# Patient Record
Sex: MALE | Race: BLACK OR AFRICAN AMERICAN | Employment: OTHER | URBAN - METROPOLITAN AREA
[De-identification: names, ages, dates, MRNs, and addresses within clinical notes are randomized per-mention and may not be internally consistent; named-entity substitution may affect disease eponyms.]

---

## 2020-10-02 ENCOUNTER — VIRTUAL VISIT (OUTPATIENT)
Dept: INTERNAL MEDICINE CLINIC | Age: 65
End: 2020-10-02
Payer: MEDICARE

## 2020-10-02 DIAGNOSIS — Z20.822 ENCOUNTER FOR SCREENING LABORATORY TESTING FOR COVID-19 VIRUS: ICD-10-CM

## 2020-10-02 DIAGNOSIS — M54.50 CHRONIC BILATERAL LOW BACK PAIN, UNSPECIFIED WHETHER SCIATICA PRESENT: ICD-10-CM

## 2020-10-02 DIAGNOSIS — G89.29 CHRONIC KNEE PAIN, UNSPECIFIED LATERALITY: ICD-10-CM

## 2020-10-02 DIAGNOSIS — N40.1 BENIGN PROSTATIC HYPERPLASIA WITH LOWER URINARY TRACT SYMPTOMS, SYMPTOM DETAILS UNSPECIFIED: ICD-10-CM

## 2020-10-02 DIAGNOSIS — I48.0 PAROXYSMAL ATRIAL FIBRILLATION (HCC): ICD-10-CM

## 2020-10-02 DIAGNOSIS — I10 ESSENTIAL HYPERTENSION: Primary | ICD-10-CM

## 2020-10-02 DIAGNOSIS — G89.29 CHRONIC BILATERAL LOW BACK PAIN, UNSPECIFIED WHETHER SCIATICA PRESENT: ICD-10-CM

## 2020-10-02 DIAGNOSIS — M25.569 CHRONIC KNEE PAIN, UNSPECIFIED LATERALITY: ICD-10-CM

## 2020-10-02 DIAGNOSIS — Z79.891 CHRONIC PRESCRIPTION OPIATE USE: ICD-10-CM

## 2020-10-02 PROCEDURE — G8432 DEP SCR NOT DOC, RNG: HCPCS | Performed by: PHYSICIAN ASSISTANT

## 2020-10-02 PROCEDURE — G8536 NO DOC ELDER MAL SCRN: HCPCS | Performed by: PHYSICIAN ASSISTANT

## 2020-10-02 PROCEDURE — 99213 OFFICE O/P EST LOW 20 MIN: CPT | Performed by: PHYSICIAN ASSISTANT

## 2020-10-02 PROCEDURE — G8421 BMI NOT CALCULATED: HCPCS | Performed by: PHYSICIAN ASSISTANT

## 2020-10-02 PROCEDURE — 1101F PT FALLS ASSESS-DOCD LE1/YR: CPT | Performed by: PHYSICIAN ASSISTANT

## 2020-10-02 PROCEDURE — 3017F COLORECTAL CA SCREEN DOC REV: CPT | Performed by: PHYSICIAN ASSISTANT

## 2020-10-02 PROCEDURE — G8427 DOCREV CUR MEDS BY ELIG CLIN: HCPCS | Performed by: PHYSICIAN ASSISTANT

## 2020-10-02 RX ORDER — HYDROCHLOROTHIAZIDE 25 MG/1
TABLET ORAL
COMMUNITY
Start: 2020-08-21 | End: 2020-10-21 | Stop reason: SDUPTHER

## 2020-10-02 RX ORDER — CARVEDILOL 25 MG/1
TABLET ORAL
COMMUNITY
Start: 2020-08-24 | End: 2020-10-21 | Stop reason: SDUPTHER

## 2020-10-02 RX ORDER — TAMSULOSIN HYDROCHLORIDE 0.4 MG/1
CAPSULE ORAL
COMMUNITY
Start: 2020-09-08 | End: 2020-10-21 | Stop reason: SDUPTHER

## 2020-10-02 RX ORDER — APIXABAN 5 MG/1
TABLET, FILM COATED ORAL
COMMUNITY
Start: 2020-09-09 | End: 2020-10-21 | Stop reason: SDUPTHER

## 2020-10-02 RX ORDER — TRAMADOL HYDROCHLORIDE 50 MG/1
TABLET ORAL
COMMUNITY
Start: 2020-08-25 | End: 2020-10-21 | Stop reason: SDUPTHER

## 2020-10-02 RX ORDER — FINASTERIDE 5 MG/1
TABLET, FILM COATED ORAL
COMMUNITY
Start: 2020-09-11 | End: 2020-10-21 | Stop reason: SDUPTHER

## 2020-10-02 RX ORDER — AMLODIPINE BESYLATE 10 MG/1
TABLET ORAL
COMMUNITY
Start: 2020-09-11 | End: 2020-10-21 | Stop reason: SDUPTHER

## 2020-10-02 NOTE — PROGRESS NOTES
1. Have you been to the ER, urgent care clinic since your last visit? Hospitalized since your last visit?no    2. Have you seen or consulted any other health care providers outside of the [address] [place] [address] since your last visit? Include any pap smears or colon screening. No   please link to [email or web address]

## 2020-10-02 NOTE — PROGRESS NOTES
Archie Singh is a 77 y.o. male who was seen by synchronous (real-time) audio-video technology on 10/2/2020    Consent: Archie Singh, who was seen by synchronous (real-time) audio-video technology, and/or his healthcare decision maker, is aware that this patient-initiated, Telehealth encounter on 10/2/2020 is a billable service, with coverage as determined by his insurance carrier. He is aware that he may receive a bill and has provided verbal consent to proceed: YES    Assessment & Plan:   Diagnoses and all orders for this visit:    1. Essential hypertension  Controlled previously per pt. 2. Paroxysmal atrial fibrillation (HCC)  -     REFERRAL TO CARDIOLOGY    3. Benign prostatic hyperplasia with lower urinary tract symptoms, symptom details unspecified  -     REFERRAL TO UROLOGY    4. Encounter for screening laboratory testing for COVID-19 virus  -     SARS-COV-2 AB, IGG; Future    5. Chronic bilateral low back pain, unspecified whether sciatica present  -     REFERRAL TO ORTHOPEDIC SURGERY    6. Chronic knee pain, unspecified laterality  -     REFERRAL TO ORTHOPEDIC SURGERY    7. Chronic prescription opiate use  -     TRAMADOL; Future  -     DRUG SCREEN, URINE; Future        Subjective:   Archie Singh is a 77 y.o. male who was seen for [facility]    New patient. New to [facility]. Moved to [place] from [place] last month. Daughter lives in [place]. Last saw PCP in August. UTD with labs. Requests COVID antibody test. Asymptomatic. No concerning contacts.      HTN Follow up - BP controlled previously  BP Readings from Last 3 Encounters:   No data found for BP     Currently taking:   Key CAD CHF Meds             amLODIPine (NORVASC) 10 mg tablet (Taking) TAKE 1 TABLET BY MOUTH EVERY DAY    Eliquis 5 mg tablet (Taking) TAKE 1 TABLET BY MOUTH TWICE A DAY    carvediloL (COREG) 25 mg tablet (Taking) TAKE 1 TABLET BY MOUTH TWICE A DAY    hydroCHLOROthiazide (HYDRODIURIL) 25 mg tablet (Taking) TAKE 1 TABLET BY MOUTH EVERY DAY        Hx Afib found on preop for a knee surg. Has had 3 known episodes of a fib. Now on Eliquis for life. Flecainide Rx but not taking it daily - PRN. Has never. Last saw Cardiology recently. Wants to establish in [facility]. Chronic knee and back pain Hx ablasion of nerves in lower back and chronic knee pain - taking Tramadol 2-3x/day x 2 years. Would like ortho referral. [facility] to [facility] [place] [facility] [place]. One prescriber. Last fill of Tramadol 50 mg #180 was 8/26/20. Pt notes that #180 was more than usual due to move. Usually getting #90 q30 days. Pt believes he will next need Tramadol refill in November. Hx BPH - seeing urology regularly. Needs referral.     There are no preventive care reminders to display for this patient. Review of Systems   Constitutional: Negative for fever. Respiratory: Negative for cough and shortness of breath. Cardiovascular: Negative for chest pain and palpitations. Musculoskeletal: Positive for back pain and joint pain. Negative for falls. Neurological: Negative for dizziness, loss of consciousness and weakness. Psychiatric/Behavioral: Negative for depression and substance abuse. Objective:     General: alert, cooperative, no distress   Mental  status: normal mood, behavior, speech, dress, motor activity, and thought processes, able to follow commands   HENT: NCAT   Neck: no visualized mass   Resp: no respiratory distress   Neuro: no gross deficits   Skin: no discoloration or lesions of concern on visible areas   Psychiatric: normal affect, consistent with stated mood, no evidence of hallucinations     We discussed the expected course, resolution and complications of the diagnosis(es) in detail. Medication risks, benefits, costs, interactions, and alternatives were discussed as indicated. I advised him to contact the office if his condition worsens, changes or fails to improve as anticipated.  He expressed understanding with the diagnosis(es) and plan. Toni Caban is a 77 y.o. male who was evaluated by a video visit encounter for concerns as above. Patient identification was verified prior to start of the visit. A caregiver was present when appropriate. Due to this being a TeleHealth encounter (During [facility]-99 public health emergency), evaluation of the following organ systems was limited: Vitals/Constitutional/EENT/Resp/CV/GI//MS/Neuro/Skin/Heme-Lymph-Imm. Pursuant to the emergency declaration under the [facility], [facility] waiver authority and the [employer] and [employer] Act, this Virtual  Visit was conducted, with patient's (and/or legal guardian's) consent, to reduce the patient's risk of exposure to COVID-19 and provide necessary medical care. Services were provided through a video synchronous discussion virtually to substitute for in-person clinic visit. Patient and provider were located at their individual homes.       Anastasia Stoddard PA-C

## 2020-10-05 DIAGNOSIS — Z20.822 ENCOUNTER FOR SCREENING LABORATORY TESTING FOR COVID-19 VIRUS: ICD-10-CM

## 2020-10-05 DIAGNOSIS — Z79.891 CHRONIC PRESCRIPTION OPIATE USE: ICD-10-CM

## 2020-10-06 ENCOUNTER — HOSPITAL ENCOUNTER (OUTPATIENT)
Dept: LAB | Age: 65
Discharge: HOME | End: 2020-10-06
Payer: MEDICARE

## 2020-10-06 PROCEDURE — 80307 DRUG TEST PRSMV CHEM ANLYZR: CPT

## 2020-10-06 PROCEDURE — 86769 SARS-COV-2 COVID-19 ANTIBODY: CPT

## 2020-10-06 PROCEDURE — 80373 DRUG SCREENING TRAMADOL: CPT

## 2020-10-06 PROCEDURE — 36415 COLL VENOUS BLD VENIPUNCTURE: CPT

## 2020-10-13 ENCOUNTER — TELEPHONE (OUTPATIENT)
Dept: INTERNAL MEDICINE CLINIC | Age: 65
End: 2020-10-13

## 2020-10-13 DIAGNOSIS — Z79.891 CHRONIC PRESCRIPTION OPIATE USE: ICD-10-CM

## 2020-10-13 DIAGNOSIS — Z79.891 CHRONIC PRESCRIPTION OPIATE USE: Primary | ICD-10-CM

## 2020-10-13 NOTE — TELEPHONE ENCOUNTER
[facility] is requesting a different test order number for drug screen. Please fax this order to them.

## 2020-10-21 ENCOUNTER — OFFICE VISIT (OUTPATIENT)
Dept: INTERNAL MEDICINE CLINIC | Age: 65
End: 2020-10-21
Payer: MEDICARE

## 2020-10-21 VITALS
DIASTOLIC BLOOD PRESSURE: 80 MMHG | HEART RATE: 89 BPM | WEIGHT: 287.2 LBS | HEIGHT: 73 IN | TEMPERATURE: 96 F | BODY MASS INDEX: 38.06 KG/M2 | RESPIRATION RATE: 18 BRPM | SYSTOLIC BLOOD PRESSURE: 152 MMHG

## 2020-10-21 DIAGNOSIS — I48.0 PAROXYSMAL ATRIAL FIBRILLATION (HCC): ICD-10-CM

## 2020-10-21 DIAGNOSIS — M54.50 CHRONIC BILATERAL LOW BACK PAIN, UNSPECIFIED WHETHER SCIATICA PRESENT: ICD-10-CM

## 2020-10-21 DIAGNOSIS — Z79.891 CHRONIC PRESCRIPTION OPIATE USE: ICD-10-CM

## 2020-10-21 DIAGNOSIS — M79.2 NEURALGIA: ICD-10-CM

## 2020-10-21 DIAGNOSIS — Z00.00 MEDICARE ANNUAL WELLNESS VISIT, SUBSEQUENT: Primary | ICD-10-CM

## 2020-10-21 DIAGNOSIS — M25.569 CHRONIC KNEE PAIN, UNSPECIFIED LATERALITY: ICD-10-CM

## 2020-10-21 DIAGNOSIS — E66.01 SEVERE OBESITY (HCC): ICD-10-CM

## 2020-10-21 DIAGNOSIS — G89.29 CHRONIC BILATERAL LOW BACK PAIN, UNSPECIFIED WHETHER SCIATICA PRESENT: ICD-10-CM

## 2020-10-21 DIAGNOSIS — G89.29 CHRONIC KNEE PAIN, UNSPECIFIED LATERALITY: ICD-10-CM

## 2020-10-21 DIAGNOSIS — N40.1 BENIGN PROSTATIC HYPERPLASIA WITH LOWER URINARY TRACT SYMPTOMS, SYMPTOM DETAILS UNSPECIFIED: ICD-10-CM

## 2020-10-21 DIAGNOSIS — I10 ESSENTIAL HYPERTENSION: ICD-10-CM

## 2020-10-21 LAB
ALPRAZ UR QL: NEGATIVE
AMPHETAMINES UR QL SCN: NEGATIVE NG/ML
BARBITURATES UR QL SCN: NEGATIVE NG/ML
BENZODIAZ UR QL: NEGATIVE NG/ML
BZE UR QL SCN: NEGATIVE NG/ML
CANNABINOIDS UR QL SCN: NEGATIVE NG/ML
CLONAZEPAM UR QL: NEGATIVE
CREAT UR-MCNC: 112.2 MG/DL (ref 20–300)
FLURAZEPAM UR QL: NEGATIVE
LORAZEPAM UR QL: NEGATIVE
METHADONE UR QL SCN: NEGATIVE NG/ML
MIDAZOLAM UR QL CFM: NEGATIVE
NORDIAZEPAM UR QL: NEGATIVE
OPIATES UR QL SCN: NEGATIVE NG/ML
OXAZEPAM UR QL: NEGATIVE
OXYCODONE+OXYMORPHONE UR QL SCN: NEGATIVE NG/ML
PCP UR QL: NEGATIVE NG/ML
PH UR: 6.7 [PH] (ref 4.5–8.9)
PLEASE NOTE:, 733157: NORMAL
PROPOXYPH UR QL SCN: NEGATIVE NG/ML
SP GR UR: 1.02
TEMAZEPAM UR QL CFM: NEGATIVE
TRIAZOLAM UR QL: NEGATIVE

## 2020-10-21 PROCEDURE — G0439 PPPS, SUBSEQ VISIT: HCPCS | Performed by: PHYSICIAN ASSISTANT

## 2020-10-21 PROCEDURE — G8536 NO DOC ELDER MAL SCRN: HCPCS | Performed by: PHYSICIAN ASSISTANT

## 2020-10-21 PROCEDURE — 99214 OFFICE O/P EST MOD 30 MIN: CPT | Performed by: PHYSICIAN ASSISTANT

## 2020-10-21 PROCEDURE — G8427 DOCREV CUR MEDS BY ELIG CLIN: HCPCS | Performed by: PHYSICIAN ASSISTANT

## 2020-10-21 PROCEDURE — 3017F COLORECTAL CA SCREEN DOC REV: CPT | Performed by: PHYSICIAN ASSISTANT

## 2020-10-21 PROCEDURE — G8432 DEP SCR NOT DOC, RNG: HCPCS | Performed by: PHYSICIAN ASSISTANT

## 2020-10-21 PROCEDURE — G0463 HOSPITAL OUTPT CLINIC VISIT: HCPCS | Performed by: PHYSICIAN ASSISTANT

## 2020-10-21 PROCEDURE — 1101F PT FALLS ASSESS-DOCD LE1/YR: CPT | Performed by: PHYSICIAN ASSISTANT

## 2020-10-21 PROCEDURE — G8417 CALC BMI ABV UP PARAM F/U: HCPCS | Performed by: PHYSICIAN ASSISTANT

## 2020-10-21 RX ORDER — FINASTERIDE 5 MG/1
5 TABLET, FILM COATED ORAL DAILY
Qty: 90 TAB | Refills: 1 | Status: SHIPPED | OUTPATIENT
Start: 2020-10-21 | End: 2021-01-04 | Stop reason: ALTCHOICE

## 2020-10-21 RX ORDER — TAMSULOSIN HYDROCHLORIDE 0.4 MG/1
0.4 CAPSULE ORAL DAILY
Qty: 90 CAP | Refills: 3 | Status: SHIPPED | OUTPATIENT
Start: 2020-10-21 | End: 2021-06-18

## 2020-10-21 RX ORDER — CARVEDILOL 25 MG/1
25 TABLET ORAL 2 TIMES DAILY
Qty: 180 TAB | Refills: 1 | Status: SHIPPED | OUTPATIENT
Start: 2020-10-21 | End: 2021-06-15

## 2020-10-21 RX ORDER — HYDROCHLOROTHIAZIDE 25 MG/1
25 TABLET ORAL DAILY
Qty: 90 TAB | Refills: 1 | Status: SHIPPED | OUTPATIENT
Start: 2020-10-21 | End: 2021-01-18 | Stop reason: SDUPTHER

## 2020-10-21 RX ORDER — TRAMADOL HYDROCHLORIDE 50 MG/1
50 TABLET ORAL
Qty: 180 TAB | Refills: 1 | Status: SHIPPED | OUTPATIENT
Start: 2020-10-21 | End: 2021-04-15 | Stop reason: SDUPTHER

## 2020-10-21 RX ORDER — AMLODIPINE BESYLATE 10 MG/1
10 TABLET ORAL DAILY
Qty: 90 TAB | Refills: 1 | Status: SHIPPED | OUTPATIENT
Start: 2020-10-21 | End: 2021-06-14

## 2020-10-21 RX ORDER — APIXABAN 5 MG/1
TABLET, FILM COATED ORAL
Qty: 180 TAB | Refills: 3 | Status: SHIPPED | OUTPATIENT
Start: 2020-10-21 | End: 2021-11-23

## 2020-10-21 NOTE — PROGRESS NOTES
Hua Whitehead is a 77 y.o. male  Chief Complaint   Patient presents with    Blood Pressure Check     follow up     Medication Refill     90 day refills      Visit Vitals  BP (!) 143/97 (BP 1 Location: Right arm, BP Patient Position: Sitting)   Pulse 89   Temp (!) 96 °F (35.6 °C) (Temporal)   Resp 18   Ht 6' 1\" (1.854 m)   Wt 287 lb 3.2 oz (130.3 kg)   BMI 37.89 kg/m²      Health Maintenance Due   Topic Date Due    Hepatitis C Screening  1955    DTaP/Tdap/Td series (1 - Tdap) 05/04/1974    Lipid Screen  05/04/1993    Shingrix Vaccine Age 50> (1 of 2) 05/04/2003    Colorectal Cancer Screening Combo  05/04/2003    GLAUCOMA SCREENING Q2Y  05/04/2018    Pneumococcal 65+ years (1 of 1 - PPSV23) 05/04/2018    Flu Vaccine (1) 09/01/2020    Medicare Yearly Exam  10/21/2020       [facility]  First in person visit since establishing as a new patient virtually. New to area - from [place]. Long hx low back pain and knee pain - referred to ortho and drug testing done (+for tramadol, - for other drugs) at last visit. Currently taking Tramadol 2-3x/day for years. Requests refill. VPMP WNL. Seeing ortho for knee soon. Needs ortho spine info again. Notes understanding that goal of tx is to decrease dependency on Tramadol. L foot numbness off and on x years - worse lately. Hx AFib - Seeing cardiology tomorrow. Hx BPH - Seeing Urology regularly     HTN Follow up - BP controlled at home. High in office today:    BP Readings from Last 3 Encounters:   10/21/20 (!) 152/80     Currently taking:   Key CAD CHF Meds             amLODIPine (NORVASC) 10 mg tablet (Taking) TAKE 1 TABLET BY MOUTH EVERY DAY    Eliquis 5 mg tablet (Taking) TAKE 1 TABLET BY MOUTH TWICE A DAY    carvediloL (COREG) 25 mg tablet (Taking) TAKE 1 TABLET BY MOUTH TWICE A DAY    hydroCHLOROthiazide (HYDRODIURIL) 25 mg tablet (Taking) TAKE 1 TABLET BY MOUTH EVERY DAY        BP at home: 118-120/80s. Never highter. Avoiding salt. Compliant with meds. ROS  Review of Systems   Constitutional: Negative for fever. Respiratory: Negative for shortness of breath. Cardiovascular: Negative for chest pain and palpitations. Genitourinary: Positive for frequency and urgency. Negative for dysuria. Musculoskeletal: Positive for back pain and joint pain. Negative for falls. Neurological: Negative for dizziness, loss of consciousness and weakness. EXAM  Physical Exam  Vitals signs and nursing note reviewed. Constitutional:       General: He is not in acute distress. Appearance: He is well-developed. HENT:      Head: Normocephalic and atraumatic. Neck:      Musculoskeletal: Neck supple. Vascular: No JVD. Cardiovascular:      Rate and Rhythm: Normal rate and regular rhythm. Heart sounds: Normal heart sounds. Pulmonary:      Effort: Pulmonary effort is normal. No respiratory distress. Breath sounds: Normal breath sounds. Skin:     General: Skin is warm and dry. Neurological:      Mental Status: He is alert and oriented to person, place, and time. Psychiatric:         Mood and Affect: Mood normal.         Behavior: Behavior normal.         Thought Content: Thought content normal.         Judgment: Judgment normal.       ASSESSMENT/PLAN  1. Severe obesity ([address]  Discussed diet/exercise and options for/importance of losing weight. 2. Chronic bilateral low back pain, unspecified whether sciatica present  - REFERRAL TO ORTHOPEDIC SURGERY  - traMADoL (ULTRAM) 50 mg tablet; Take 1 Tab by mouth two (2) times daily as needed for Pain for up to 180 days. Max Daily Amount: 100 mg. Dispense: 180 Tab; Refill: 1    3. Chronic prescription opiate use  Discussed that goal with seeing ortho is to treat the root problem and thus prevent need for chronic opiate use. Pt notes understanding and agrees. 4. Chronic knee pain, unspecified laterality  Refill traMADoL (ULTRAM) 50 mg tablet;  Take 1 Tab by mouth two (2) times daily as needed for Pain for up to 180 days. Max Daily Amount: 100 mg. Dispense: 180 Tab; Refill: 1    5. Benign prostatic hyperplasia with lower urinary tract symptoms, symptom details unspecified  UTD with urology per pt. - finasteride (PROSCAR) 5 mg tablet; Take 1 Tab by mouth daily. Dispense: 90 Tab; Refill: 1  - tamsulosin (FLOMAX) 0.4 mg capsule; Take 1 Cap by mouth daily. Dispense: 90 Cap; Refill: 3    6. Essential hypertension  Uncontrolled in office today. Follow up with cardiology as planned. Refills sent today  - amLODIPine (NORVASC) 10 mg tablet; Take 1 Tab by mouth daily. Dispense: 90 Tab; Refill: 1  - carvediloL (COREG) 25 mg tablet; Take 1 Tab by mouth two (2) times a day. Dispense: 180 Tab; Refill: 1  - hydroCHLOROthiazide (HYDRODIURIL) 25 mg tablet; Take 1 Tab by mouth daily. Dispense: 90 Tab; Refill: 1    7. Neuralgia  I suspect this is related to DDD.   - REFERRAL TO ORTHOPEDIC SURGERY    8. Paroxysmal atrial fibrillation (HCC)  Regular rhythm in office today. Refill  - Eliquis 5 mg tablet; TAKE 1 TABLET BY MOUTH TWICE A DAY  Dispense: 180 Tab;  Refill: 3

## 2020-10-21 NOTE — PROGRESS NOTES
This is the Subsequent Medicare Annual Wellness Exam, performed 12 months or more after the Initial AWV or the last Subsequent AWV  Health Maintenance Due   Topic Date Due    Hepatitis C Screening  1955    DTaP/Tdap/Td series (1 - Tdap) 05/04/1974    Lipid Screen  05/04/1993    Shingrix Vaccine Age 50> (1 of 2) 05/04/2003    Colorectal Cancer Screening Combo  05/04/2003    GLAUCOMA SCREENING Q2Y  05/04/2018    Pneumococcal 65+ years (1 of 1 - PPSV23) 05/04/2018    Flu Vaccine (1) 09/01/2020    Medicare Yearly Exam  10/21/2020     Depression Risk Factor Screening:     3 most recent PHQ Screens 10/21/2020   Little interest or pleasure in doing things Not at all   Feeling down, depressed, irritable, or hopeless Not at all   Total Score PHQ 2 0     Alcohol Risk Factor Screening:    reports previous alcohol use. None now    Functional Ability and Level of Safety:   Hearing Loss  Hearing is good. Activities of Daily Living  The home contains: handrails  Patient does total self care     Fall Risk  Fall Risk Assessment, last 12 mths 10/21/2020   Able to walk? Yes   Fall in past 12 months? No     Abuse Screen  Patient is not abused    Cognitive Screening   Evaluation of Cognitive Function:  Has your family/caregiver stated any concerns about your memory: no     Patient Care Team   Patient Care Team:  Zac Grossman as PCP - General (Physician Assistant)  Zac Grossman as PCP - [facility] Empaneled Provider    Assessment/Plan   Education and counseling provided:  Are appropriate based on today's review and evaluation    Extended Emergency Contact Information  Primary Emergency Contact: [address] [place] [address] Phone: [phone]  Relation: Daughter  Secondary Emergency Contact: [address] [place] [address],  [address] Phone: [phone]  Relation: Son    I have reviewed the patient's medical history in detail and updated the computerized patient record. History   History reviewed.  No pertinent past medical history. History reviewed. No pertinent surgical history.   Current Outpatient Medications   Medication Sig Dispense Refill    amLODIPine (NORVASC) 10 mg tablet TAKE 1 TABLET BY MOUTH EVERY DAY      Eliquis 5 mg tablet TAKE 1 TABLET BY MOUTH TWICE A DAY      carvediloL (COREG) 25 mg tablet TAKE 1 TABLET BY MOUTH TWICE A DAY      finasteride (PROSCAR) 5 mg tablet TAKE 1 TABLET BY MOUTH EVERY DAY      hydroCHLOROthiazide (HYDRODIURIL) 25 mg tablet TAKE 1 TABLET BY MOUTH EVERY DAY      tamsulosin (FLOMAX) 0.4 mg capsule TAKE 1 CAPSULE BY ORAL ROUTE EVERY DAY 1/2 HOUR FOLLOWING THE SAME MEAL EACH DAY      traMADoL (ULTRAM) 50 mg tablet TAKE 1 2 TABLETS BY MOUTH THREE TIMES A DAY AS NEEDED FOR PAIN       Allergies   Allergen Reactions    Lisinopril Swelling     Family History   Problem Relation Age of Onset    Cancer Mother     Hypertension Father     Hypertension Sister     Diabetes Sister      Social History     Tobacco Use    Smoking status: Never Smoker    Smokeless tobacco: Never Used   Substance Use Topics    Alcohol use: Not Currently     Patient Active Problem List   Diagnosis Code    Essential hypertension I10    Paroxysmal atrial fibrillation (HCC) I48.0    Benign prostatic hyperplasia with lower urinary tract symptoms, symptom details unspecified N40.1    Chronic bilateral low back pain, unspecified whether sciatica present M54.5, G89.29    Chronic knee pain, unspecified laterality M25.569, G89.29    Chronic prescription opiate use Z79.891    Severe obesity ([facility]) E66.01

## 2020-10-21 NOTE — PROGRESS NOTES
Chief Complaint   Patient presents with    Blood Pressure Check     follow up     Medication Refill     90 day refills          1. Have you been to the ER, urgent care clinic since your last visit? Hospitalized since your last visit? No    2. Have you seen or consulted any other health care providers outside of the [address] [place] [address] since your last visit? Include any pap smears or colon screening.  No

## 2020-11-30 ENCOUNTER — PATIENT MESSAGE (OUTPATIENT)
Dept: INTERNAL MEDICINE CLINIC | Age: 65
End: 2020-11-30

## 2020-12-07 ENCOUNTER — TELEPHONE (OUTPATIENT)
Dept: INTERNAL MEDICINE CLINIC | Age: 65
End: 2020-12-07

## 2020-12-07 DIAGNOSIS — Z12.11 SCREEN FOR COLON CANCER: Primary | ICD-10-CM

## 2020-12-29 ENCOUNTER — TELEPHONE (OUTPATIENT)
Dept: INTERNAL MEDICINE CLINIC | Age: 65
End: 2020-12-29

## 2020-12-29 NOTE — TELEPHONE ENCOUNTER
Patient wants to check his A1C he doesn't want to wait until [place] come back next week.   Requesting a labslip call when ready

## 2021-01-04 ENCOUNTER — OFFICE VISIT (OUTPATIENT)
Dept: INTERNAL MEDICINE CLINIC | Age: 66
End: 2021-01-04
Payer: MEDICARE

## 2021-01-04 VITALS
HEART RATE: 65 BPM | TEMPERATURE: 97.1 F | HEIGHT: 73 IN | OXYGEN SATURATION: 98 % | BODY MASS INDEX: 38.83 KG/M2 | DIASTOLIC BLOOD PRESSURE: 80 MMHG | SYSTOLIC BLOOD PRESSURE: 142 MMHG | RESPIRATION RATE: 20 BRPM | WEIGHT: 293 LBS

## 2021-01-04 DIAGNOSIS — Z13.220 SCREENING CHOLESTEROL LEVEL: ICD-10-CM

## 2021-01-04 DIAGNOSIS — Z13.220 LIPID SCREENING: ICD-10-CM

## 2021-01-04 DIAGNOSIS — Z12.5 SCREENING FOR PROSTATE CANCER: ICD-10-CM

## 2021-01-04 DIAGNOSIS — E66.01 SEVERE OBESITY (HCC): ICD-10-CM

## 2021-01-04 DIAGNOSIS — I10 ESSENTIAL HYPERTENSION: Primary | ICD-10-CM

## 2021-01-04 DIAGNOSIS — M54.50 CHRONIC BILATERAL LOW BACK PAIN, UNSPECIFIED WHETHER SCIATICA PRESENT: ICD-10-CM

## 2021-01-04 DIAGNOSIS — R73.09 ELEVATED HEMOGLOBIN A1C: ICD-10-CM

## 2021-01-04 DIAGNOSIS — M25.569 CHRONIC KNEE PAIN, UNSPECIFIED LATERALITY: ICD-10-CM

## 2021-01-04 DIAGNOSIS — G47.00 INSOMNIA, UNSPECIFIED TYPE: ICD-10-CM

## 2021-01-04 DIAGNOSIS — I48.0 PAROXYSMAL ATRIAL FIBRILLATION (HCC): ICD-10-CM

## 2021-01-04 DIAGNOSIS — G89.29 CHRONIC BILATERAL LOW BACK PAIN, UNSPECIFIED WHETHER SCIATICA PRESENT: ICD-10-CM

## 2021-01-04 DIAGNOSIS — R79.89 ABNORMAL CBC: ICD-10-CM

## 2021-01-04 DIAGNOSIS — G89.29 CHRONIC KNEE PAIN, UNSPECIFIED LATERALITY: ICD-10-CM

## 2021-01-04 PROCEDURE — 1101F PT FALLS ASSESS-DOCD LE1/YR: CPT | Performed by: PHYSICIAN ASSISTANT

## 2021-01-04 PROCEDURE — G8536 NO DOC ELDER MAL SCRN: HCPCS | Performed by: PHYSICIAN ASSISTANT

## 2021-01-04 PROCEDURE — G0463 HOSPITAL OUTPT CLINIC VISIT: HCPCS | Performed by: PHYSICIAN ASSISTANT

## 2021-01-04 PROCEDURE — G8754 DIAS BP LESS 90: HCPCS | Performed by: PHYSICIAN ASSISTANT

## 2021-01-04 PROCEDURE — G8427 DOCREV CUR MEDS BY ELIG CLIN: HCPCS | Performed by: PHYSICIAN ASSISTANT

## 2021-01-04 PROCEDURE — G8432 DEP SCR NOT DOC, RNG: HCPCS | Performed by: PHYSICIAN ASSISTANT

## 2021-01-04 PROCEDURE — G8753 SYS BP > OR = 140: HCPCS | Performed by: PHYSICIAN ASSISTANT

## 2021-01-04 PROCEDURE — 99214 OFFICE O/P EST MOD 30 MIN: CPT | Performed by: PHYSICIAN ASSISTANT

## 2021-01-04 PROCEDURE — 3017F COLORECTAL CA SCREEN DOC REV: CPT | Performed by: PHYSICIAN ASSISTANT

## 2021-01-04 PROCEDURE — G8417 CALC BMI ABV UP PARAM F/U: HCPCS | Performed by: PHYSICIAN ASSISTANT

## 2021-01-04 RX ORDER — DICLOFENAC SODIUM 10 MG/G
4 GEL TOPICAL 4 TIMES DAILY
Qty: 120 G | Refills: 5 | Status: SHIPPED | OUTPATIENT
Start: 2021-01-04 | End: 2021-08-10 | Stop reason: SDUPTHER

## 2021-01-04 NOTE — PROGRESS NOTES
Chief Complaint   Patient presents with    Blood Pressure Check    Sleep Problem         1. Have you been to the ER, urgent care clinic since your last visit? Hospitalized since your last visit? No    2. Have you seen or consulted any other health care providers outside of the [address] [place] [address] since your last visit? Include any pap smears or colon screening.  No

## 2021-01-04 NOTE — PROGRESS NOTES
Zachary Mercedes is a 77 y.o. male  Chief Complaint   Patient presents with    Blood Pressure Check    Sleep Problem     Visit Vitals  BP (!) 142/80   Pulse 65   Temp 97.1 °F (36.2 °C) (Temporal)   Resp 20   Ht 6' 1\" (1.854 m)   Wt 293 lb (132.9 kg)   SpO2 98%   BMI 38.66 kg/m²      Health Maintenance Due   Topic Date Due    DTaP/Tdap/Td series (1 - Tdap) 05/04/1974    Lipid Screen  05/04/1993    Shingrix Vaccine Age 50> (1 of 2) 05/04/2003    Colorectal Cancer Screening Combo  05/04/2003    GLAUCOMA SCREENING Q2Y  05/04/2018    Pneumococcal 65+ years (1 of 1 - PPSV23) 05/04/2018    Flu Vaccine (1) 09/01/2020       HPI  HTN Follow up - BP at recheck moderately controlled:  BP Readings from Last 3 Encounters:   01/04/21 (!) 142/80   10/21/20 (!) 152/80     Currently taking:   Key CAD CHF Meds             amLODIPine (NORVASC) 10 mg tablet (Taking) Take 1 Tab by mouth daily. Eliquis 5 mg tablet (Taking) TAKE 1 TABLET BY MOUTH TWICE A DAY    carvediloL (COREG) 25 mg tablet (Taking) Take 1 Tab by mouth two (2) times a day. hydroCHLOROthiazide (HYDRODIURIL) 25 mg tablet (Taking) Take 1 Tab by mouth daily. Wt Readings from Last 3 Encounters:   01/04/21 293 lb (132.9 kg)   10/21/20 287 lb 3.2 oz (130.3 kg)     Insomnia x 2 weeks. Tried CBT, sauna, sleepytime tea, melatonin. Melatonin was somewhat helpful, but took it in the middle of the night and felt sleepy the next day. Sleeping for 2 hours, then awake the rest of the night. Pt attributes Insomnia to cutting back on Tramadol. Otherwise, doing well with cutting back on Tramadol. Seeing Pain Management & Ortho for back pain. Send PSA results to [facility]. Dr. Buelah Fleischer. Hx abnormal CBC (remote). Has not had this checked in at least 1 year (new to our clinic). ROS  Review of Systems   Constitutional: Negative for fever. Respiratory: Negative for shortness of breath. Cardiovascular: Negative for chest pain and palpitations. Musculoskeletal: Positive for back pain. Neurological: Negative for dizziness, loss of consciousness and weakness. Psychiatric/Behavioral: Negative for depression. The patient has insomnia. The patient is not nervous/anxious. EXAM  Physical Exam  Vitals signs and nursing note reviewed. Constitutional:       General: He is not in acute distress. Appearance: He is well-developed. HENT:      Head: Normocephalic and atraumatic. Neck:      Musculoskeletal: Neck supple. Vascular: No JVD. Cardiovascular:      Rate and Rhythm: Normal rate and regular rhythm. Heart sounds: Normal heart sounds. Pulmonary:      Effort: Pulmonary effort is normal. No respiratory distress. Breath sounds: Normal breath sounds. Skin:     General: Skin is warm and dry. Neurological:      Mental Status: He is alert and oriented to person, place, and time. Psychiatric:         Mood and Affect: Mood normal.         Behavior: Behavior normal.         Thought Content: Thought content normal.         Judgment: Judgment normal.       ASSESSMENT/PLAN  1. Essential hypertension  Not perfectly controlled in the office today. Encouraged pt to monitor at home and if staying in this range, follow up soon. - METABOLIC PANEL, COMPREHENSIVE; Future  - LIPID PANEL; Future    2. Chronic bilateral low back pain, unspecified whether sciatica present  Continue following with pain management/ortho   Congratulated pt on decreasing Tramadol. 3. Chronic knee pain, unspecified laterality  Continue following with pain management/ortho   - diclofenac (VOLTAREN) 1 % gel; Apply 4 g to affected area four (4) times daily. Dispense: 120 g; Refill: 5    4. Severe obesity ([address]  Discussed diet/exercise and options for/importance of losing weight. 5. Screening cholesterol level  Lipids ordered    6. Elevated hemoglobin A1c  - HEMOGLOBIN A1C WITH EAG; Future    7. Abnormal CBC  - CBC WITH AUTOMATED DIFF; Future    8.  Insomnia, unspecified type  - TSH 3RD GENERATION; Future  Continue Melatonin 10 mg nightly x at least 2 weeks. Discussed sleep hygiene. 9. Screening for prostate cancer  - PSA SCREENING (SCREENING); Future    10. Paroxysmal atrial fibrillation (HCC)  Continue Eliquis    11.  Lipid screening  See above

## 2021-01-05 ENCOUNTER — HOSPITAL ENCOUNTER (OUTPATIENT)
Dept: LAB | Age: 66
Discharge: HOME | End: 2021-01-05
Payer: MEDICARE

## 2021-01-05 DIAGNOSIS — R79.89 ABNORMAL CBC: ICD-10-CM

## 2021-01-05 DIAGNOSIS — Z12.5 SCREENING FOR PROSTATE CANCER: ICD-10-CM

## 2021-01-05 DIAGNOSIS — R73.09 ELEVATED HEMOGLOBIN A1C: ICD-10-CM

## 2021-01-05 DIAGNOSIS — G47.00 INSOMNIA, UNSPECIFIED TYPE: ICD-10-CM

## 2021-01-05 DIAGNOSIS — I10 ESSENTIAL HYPERTENSION: ICD-10-CM

## 2021-01-05 PROCEDURE — 36415 COLL VENOUS BLD VENIPUNCTURE: CPT

## 2021-01-05 PROCEDURE — 84153 ASSAY OF PSA TOTAL: CPT

## 2021-01-05 PROCEDURE — 85025 COMPLETE CBC W/AUTO DIFF WBC: CPT

## 2021-01-05 PROCEDURE — 83036 HEMOGLOBIN GLYCOSYLATED A1C: CPT

## 2021-01-05 PROCEDURE — 84443 ASSAY THYROID STIM HORMONE: CPT

## 2021-01-05 PROCEDURE — 80053 COMPREHEN METABOLIC PANEL: CPT

## 2021-01-05 PROCEDURE — 80061 LIPID PANEL: CPT

## 2021-01-06 ENCOUNTER — TELEPHONE (OUTPATIENT)
Dept: INTERNAL MEDICINE CLINIC | Age: 66
End: 2021-01-06

## 2021-01-06 LAB
ALBUMIN SERPL-MCNC: 4.4 G/DL (ref 3.8–4.8)
ALBUMIN/GLOB SERPL: 1.4 {RATIO} (ref 1.2–2.2)
ALP SERPL-CCNC: 102 IU/L (ref 39–117)
ALT SERPL-CCNC: 21 IU/L (ref 0–44)
AST SERPL-CCNC: 17 IU/L (ref 0–40)
BASOPHILS # BLD AUTO: 0 X10E3/UL (ref 0–0.2)
BASOPHILS NFR BLD AUTO: 1 %
BILIRUB SERPL-MCNC: 0.8 MG/DL (ref 0–1.2)
BUN SERPL-MCNC: 17 MG/DL (ref 8–27)
BUN/CREAT SERPL: 13 (ref 10–24)
CALCIUM SERPL-MCNC: 9.9 MG/DL (ref 8.6–10.2)
CHLORIDE SERPL-SCNC: 101 MMOL/L (ref 96–106)
CHOLEST SERPL-MCNC: 170 MG/DL (ref 100–199)
CO2 SERPL-SCNC: 28 MMOL/L (ref 20–29)
CREAT SERPL-MCNC: 1.26 MG/DL (ref 0.76–1.27)
EOSINOPHIL # BLD AUTO: 0.1 X10E3/UL (ref 0–0.4)
EOSINOPHIL NFR BLD AUTO: 2 %
ERYTHROCYTE [DISTWIDTH] IN BLOOD BY AUTOMATED COUNT: 14.8 % (ref 11.6–15.4)
EST. AVERAGE GLUCOSE BLD GHB EST-MCNC: 140 MG/DL
GLOBULIN SER CALC-MCNC: 3.1 G/DL (ref 1.5–4.5)
GLUCOSE SERPL-MCNC: 127 MG/DL (ref 65–99)
HBA1C MFR BLD: 6.5 % (ref 4.8–5.6)
HCT VFR BLD AUTO: 46.8 % (ref 37.5–51)
HDLC SERPL-MCNC: 35 MG/DL
HGB BLD-MCNC: 15.7 G/DL (ref 13–17.7)
IMM GRANULOCYTES # BLD AUTO: 0 X10E3/UL (ref 0–0.1)
IMM GRANULOCYTES NFR BLD AUTO: 1 %
INTERPRETATION, 910389: NORMAL
INTERPRETATION: NORMAL
LDLC SERPL CALC-MCNC: 114 MG/DL (ref 0–99)
LYMPHOCYTES # BLD AUTO: 1.4 X10E3/UL (ref 0.7–3.1)
LYMPHOCYTES NFR BLD AUTO: 28 %
Lab: NORMAL
MCH RBC QN AUTO: 28.6 PG (ref 26.6–33)
MCHC RBC AUTO-ENTMCNC: 33.5 G/DL (ref 31.5–35.7)
MCV RBC AUTO: 85 FL (ref 79–97)
MONOCYTES # BLD AUTO: 0.7 X10E3/UL (ref 0.1–0.9)
MONOCYTES NFR BLD AUTO: 14 %
NEUTROPHILS # BLD AUTO: 2.8 X10E3/UL (ref 1.4–7)
NEUTROPHILS NFR BLD AUTO: 54 %
PDF IMAGE, 910387: NORMAL
PLATELET # BLD AUTO: 220 X10E3/UL (ref 150–450)
POTASSIUM SERPL-SCNC: 4.1 MMOL/L (ref 3.5–5.2)
PROT SERPL-MCNC: 7.5 G/DL (ref 6–8.5)
PSA SERPL-MCNC: 2.7 NG/ML (ref 0–4)
RBC # BLD AUTO: 5.49 X10E6/UL (ref 4.14–5.8)
SODIUM SERPL-SCNC: 141 MMOL/L (ref 134–144)
TRIGL SERPL-MCNC: 116 MG/DL (ref 0–149)
TSH SERPL DL<=0.005 MIU/L-ACNC: 1.21 UIU/ML (ref 0.45–4.5)
VLDLC SERPL CALC-MCNC: 21 MG/DL (ref 5–40)
WBC # BLD AUTO: 5 X10E3/UL (ref 3.4–10.8)

## 2021-01-06 NOTE — TELEPHONE ENCOUNTER
Please call pt - lab results show that he has a new diagnosis of type II diabetes. Please help him schedule a 30 minute follow up appointment to discuss this.

## 2021-01-06 NOTE — TELEPHONE ENCOUNTER
Dyspnea persists. Exam w/o rales or new murmur. Meds reviewed. Needs biv upgrade. Will proceed today. Procedure and risks discussed. Patient is aware and scheduled an appointment for 1/8/2021

## 2021-01-06 NOTE — PROGRESS NOTES
New dx diabetes  Lab Results   Component Value Date/Time    Hemoglobin A1c 6.5 (H) 01/05/2021 11:23 AM     Hyperchol -   Lab Results   Component Value Date/Time    Cholesterol, total 170 01/05/2021 11:23 AM    HDL Cholesterol 35 (L) 01/05/2021 11:23 AM    LDL, calculated 114 (H) 01/05/2021 11:23 AM    VLDL, calculated 21 01/05/2021 11:23 AM    Triglyceride 116 01/05/2021 11:23 AM

## 2021-01-08 ENCOUNTER — OFFICE VISIT (OUTPATIENT)
Dept: INTERNAL MEDICINE CLINIC | Age: 66
End: 2021-01-08
Payer: MEDICARE

## 2021-01-08 VITALS
TEMPERATURE: 97 F | RESPIRATION RATE: 16 BRPM | DIASTOLIC BLOOD PRESSURE: 60 MMHG | BODY MASS INDEX: 39.1 KG/M2 | HEIGHT: 73 IN | SYSTOLIC BLOOD PRESSURE: 136 MMHG | OXYGEN SATURATION: 97 % | HEART RATE: 79 BPM | WEIGHT: 295 LBS

## 2021-01-08 DIAGNOSIS — G47.00 INSOMNIA, UNSPECIFIED TYPE: ICD-10-CM

## 2021-01-08 DIAGNOSIS — I10 ESSENTIAL HYPERTENSION: Primary | ICD-10-CM

## 2021-01-08 DIAGNOSIS — G47.33 OSA (OBSTRUCTIVE SLEEP APNEA): ICD-10-CM

## 2021-01-08 DIAGNOSIS — E11.9 TYPE 2 DIABETES MELLITUS WITHOUT COMPLICATION, WITHOUT LONG-TERM CURRENT USE OF INSULIN (HCC): ICD-10-CM

## 2021-01-08 PROCEDURE — G8432 DEP SCR NOT DOC, RNG: HCPCS | Performed by: PHYSICIAN ASSISTANT

## 2021-01-08 PROCEDURE — 3044F HG A1C LEVEL LT 7.0%: CPT | Performed by: PHYSICIAN ASSISTANT

## 2021-01-08 PROCEDURE — G0463 HOSPITAL OUTPT CLINIC VISIT: HCPCS | Performed by: PHYSICIAN ASSISTANT

## 2021-01-08 PROCEDURE — G8427 DOCREV CUR MEDS BY ELIG CLIN: HCPCS | Performed by: PHYSICIAN ASSISTANT

## 2021-01-08 PROCEDURE — G8417 CALC BMI ABV UP PARAM F/U: HCPCS | Performed by: PHYSICIAN ASSISTANT

## 2021-01-08 PROCEDURE — G8536 NO DOC ELDER MAL SCRN: HCPCS | Performed by: PHYSICIAN ASSISTANT

## 2021-01-08 PROCEDURE — 3017F COLORECTAL CA SCREEN DOC REV: CPT | Performed by: PHYSICIAN ASSISTANT

## 2021-01-08 PROCEDURE — G8754 DIAS BP LESS 90: HCPCS | Performed by: PHYSICIAN ASSISTANT

## 2021-01-08 PROCEDURE — 1101F PT FALLS ASSESS-DOCD LE1/YR: CPT | Performed by: PHYSICIAN ASSISTANT

## 2021-01-08 PROCEDURE — 2022F DILAT RTA XM EVC RTNOPTHY: CPT | Performed by: PHYSICIAN ASSISTANT

## 2021-01-08 PROCEDURE — G8752 SYS BP LESS 140: HCPCS | Performed by: PHYSICIAN ASSISTANT

## 2021-01-08 PROCEDURE — 99214 OFFICE O/P EST MOD 30 MIN: CPT | Performed by: PHYSICIAN ASSISTANT

## 2021-01-08 RX ORDER — CHOLECALCIFEROL (VITAMIN D3) 125 MCG
CAPSULE ORAL
COMMUNITY
End: 2021-12-06

## 2021-01-08 RX ORDER — FLECAINIDE ACETATE 100 MG/1
100 TABLET ORAL 2 TIMES DAILY
COMMUNITY

## 2021-01-08 NOTE — PROGRESS NOTES
Thania Pederson is a 77 y.o. male  Chief Complaint   Patient presents with    Diabetes     follow up on labs     Visit Vitals  BP (!) 150/105   Pulse 79   Temp 97 °F (36.1 °C) (Temporal)   Resp 16   Ht 6' 1\" (1.854 m)   Wt 295 lb (133.8 kg)   SpO2 97%   BMI 38.92 kg/m²      Health Maintenance Due   Topic Date Due    Foot Exam Q1  05/04/1963    MICROALBUMIN Q1  05/04/1963    Eye Exam Retinal or Dilated  05/04/1963    DTaP/Tdap/Td series (1 - Tdap) 05/04/1974    Shingrix Vaccine Age 50> (1 of 2) 05/04/2003    Colorectal Cancer Screening Combo  05/04/2003    GLAUCOMA SCREENING Q2Y  05/04/2018    Pneumococcal 65+ years (1 of 1 - PPSV23) 05/04/2018    Flu Vaccine (1) 09/01/2020       HPI  New Dx diabetes: Drinking sugar sodas regularly. Feels that he can eliminate this. Lab Results   Component Value Date/Time    Hemoglobin A1c 6.5 (H) 01/05/2021 11:23 AM     Elevated LDL and low HDL  Lab Results   Component Value Date/Time    Cholesterol, total 170 01/05/2021 11:23 AM    HDL Cholesterol 35 (L) 01/05/2021 11:23 AM    LDL, calculated 114 (H) 01/05/2021 11:23 AM    VLDL, calculated 21 01/05/2021 11:23 AM    Triglyceride 116 01/05/2021 11:23 AM     Feels that he can eat more fish. Insomnia has improved over time after stopping Tramadol. Feels there is still room for improvement. Has not yet tried Melatonin. HTN Follow up - BP controlled at recheck today:  BP Readings from Last 3 Encounters:   01/08/21 136/60   01/04/21 (!) 142/80   10/21/20 (!) 152/80     Currently taking:   Key CAD CHF Meds             flecainide (TAMBOCOR) 100 mg tablet (Taking) Take 100 mg by mouth two (2) times a day. PRN a fib    amLODIPine (NORVASC) 10 mg tablet (Taking) Take 1 Tab by mouth daily. Eliquis 5 mg tablet (Taking) TAKE 1 TABLET BY MOUTH TWICE A DAY    carvediloL (COREG) 25 mg tablet (Taking) Take 1 Tab by mouth two (2) times a day. hydroCHLOROthiazide (HYDRODIURIL) 25 mg tablet (Taking) Take 1 Tab by mouth daily. ROS  Review of Systems   Respiratory: Negative for shortness of breath. Cardiovascular: Negative for chest pain and palpitations. Neurological: Negative for dizziness, loss of consciousness and weakness. EXAM  Physical Exam  Vitals signs and nursing note reviewed. Constitutional:       General: He is not in acute distress. Appearance: He is well-developed. HENT:      Head: Normocephalic and atraumatic. Neck:      Musculoskeletal: Neck supple. Vascular: No JVD. Cardiovascular:      Rate and Rhythm: Normal rate and regular rhythm. Heart sounds: Normal heart sounds. Pulmonary:      Effort: Pulmonary effort is normal. No respiratory distress. Breath sounds: Normal breath sounds. Skin:     General: Skin is warm and dry. Neurological:      Mental Status: He is alert and oriented to person, place, and time. Psychiatric:         Mood and Affect: Mood normal.         Behavior: Behavior normal.         Thought Content: Thought content normal.         Judgment: Judgment normal.       Diabetic foot exam:     Left: Reflexes 1+     Vibratory sensation normal    Proprioception normal   Sharp/dull discrimination normal    Filament test normal sensation with micro filament   Pulse DP: 2+ (normal)   Pulse PT: 2+ (normal)   Deformities: Mild - callus throughout B dorsal feet  Right: Reflexes 1+   Vibratory sensation normal   Proprioception normal   Sharp/dull discrimination normal   Filament test normal sensation with micro filament   Pulse DP: 2+ (normal)   Pulse PT: 2+ (normal)   Deformities: Mild - callus throughout B dorsal feet    ASSESSMENT/PLAN  1. Essential hypertension  Controlled at recheck. 2. Type 2 diabetes mellitus without complication, without long-term current use of insulin ([facility])  -  DIABETES FOOT EXAM    Discussed low carb diet/exercise and options for/importance of losing weight. 3. Insomnia, unspecified type  - melatonin 5 mg tablet;  Take 15 mg every evening for sleep.     4. YNES (obstructive sleep apnea)  - REFERRAL TO PULMONARY DISEASE

## 2021-01-18 DIAGNOSIS — I10 ESSENTIAL HYPERTENSION: ICD-10-CM

## 2021-01-18 RX ORDER — HYDROCHLOROTHIAZIDE 25 MG/1
25 TABLET ORAL DAILY
Qty: 90 TAB | Refills: 1 | Status: SHIPPED | OUTPATIENT
Start: 2021-01-18 | End: 2021-07-19 | Stop reason: DRUGHIGH

## 2021-03-08 ENCOUNTER — IMMUNIZATION (OUTPATIENT)
Dept: INTERNAL MEDICINE CLINIC | Age: 66
End: 2021-03-08
Payer: MEDICARE

## 2021-03-08 DIAGNOSIS — Z23 ENCOUNTER FOR IMMUNIZATION: Primary | ICD-10-CM

## 2021-03-08 PROCEDURE — 91300 COVID-19, MRNA, LNP-S, PF, 30MCG/0.3ML DOSE(PFIZER): CPT | Performed by: FAMILY MEDICINE

## 2021-03-08 PROCEDURE — 0001A COVID-19, MRNA, LNP-S, PF, 30MCG/0.3ML DOSE(PFIZER): CPT | Performed by: FAMILY MEDICINE

## 2021-04-15 DIAGNOSIS — G89.29 CHRONIC BILATERAL LOW BACK PAIN, UNSPECIFIED WHETHER SCIATICA PRESENT: ICD-10-CM

## 2021-04-15 DIAGNOSIS — M25.569 CHRONIC KNEE PAIN, UNSPECIFIED LATERALITY: ICD-10-CM

## 2021-04-15 DIAGNOSIS — M54.50 CHRONIC BILATERAL LOW BACK PAIN, UNSPECIFIED WHETHER SCIATICA PRESENT: ICD-10-CM

## 2021-04-15 DIAGNOSIS — G89.29 CHRONIC KNEE PAIN, UNSPECIFIED LATERALITY: ICD-10-CM

## 2021-04-15 RX ORDER — TRAMADOL HYDROCHLORIDE 50 MG/1
50 TABLET ORAL
Qty: 180 TAB | Refills: 0 | Status: SHIPPED | OUTPATIENT
Start: 2021-04-15 | End: 2021-07-16 | Stop reason: SDUPTHER

## 2021-04-15 NOTE — TELEPHONE ENCOUNTER
tramdall 50mg  90day supply\"       Is patient out of this medication (yes/no): no       Pharmacy name: [facility] [address] listed in chart? (yes/no): yes   Pharmacy phone number: [phone]       Details to clarify the request: has 4 left but will be going out of town this weekend     envera

## 2021-04-21 ENCOUNTER — OFFICE VISIT (OUTPATIENT)
Dept: INTERNAL MEDICINE CLINIC | Age: 66
End: 2021-04-21
Payer: MEDICARE

## 2021-04-21 VITALS
OXYGEN SATURATION: 95 % | DIASTOLIC BLOOD PRESSURE: 70 MMHG | WEIGHT: 302.2 LBS | RESPIRATION RATE: 18 BRPM | TEMPERATURE: 98.3 F | HEART RATE: 66 BPM | HEIGHT: 73 IN | SYSTOLIC BLOOD PRESSURE: 128 MMHG | BODY MASS INDEX: 40.05 KG/M2

## 2021-04-21 DIAGNOSIS — E11.9 TYPE 2 DIABETES MELLITUS WITHOUT COMPLICATION, WITHOUT LONG-TERM CURRENT USE OF INSULIN (HCC): Primary | ICD-10-CM

## 2021-04-21 DIAGNOSIS — E66.01 SEVERE OBESITY (HCC): ICD-10-CM

## 2021-04-21 DIAGNOSIS — I10 ESSENTIAL HYPERTENSION: ICD-10-CM

## 2021-04-21 DIAGNOSIS — I48.0 PAROXYSMAL ATRIAL FIBRILLATION (HCC): ICD-10-CM

## 2021-04-21 PROCEDURE — G8417 CALC BMI ABV UP PARAM F/U: HCPCS | Performed by: PHYSICIAN ASSISTANT

## 2021-04-21 PROCEDURE — G8536 NO DOC ELDER MAL SCRN: HCPCS | Performed by: PHYSICIAN ASSISTANT

## 2021-04-21 PROCEDURE — G0463 HOSPITAL OUTPT CLINIC VISIT: HCPCS | Performed by: PHYSICIAN ASSISTANT

## 2021-04-21 PROCEDURE — 2022F DILAT RTA XM EVC RTNOPTHY: CPT | Performed by: PHYSICIAN ASSISTANT

## 2021-04-21 PROCEDURE — G8432 DEP SCR NOT DOC, RNG: HCPCS | Performed by: PHYSICIAN ASSISTANT

## 2021-04-21 PROCEDURE — G8752 SYS BP LESS 140: HCPCS | Performed by: PHYSICIAN ASSISTANT

## 2021-04-21 PROCEDURE — 1101F PT FALLS ASSESS-DOCD LE1/YR: CPT | Performed by: PHYSICIAN ASSISTANT

## 2021-04-21 PROCEDURE — G8427 DOCREV CUR MEDS BY ELIG CLIN: HCPCS | Performed by: PHYSICIAN ASSISTANT

## 2021-04-21 PROCEDURE — 3044F HG A1C LEVEL LT 7.0%: CPT | Performed by: PHYSICIAN ASSISTANT

## 2021-04-21 PROCEDURE — G8754 DIAS BP LESS 90: HCPCS | Performed by: PHYSICIAN ASSISTANT

## 2021-04-21 PROCEDURE — 99214 OFFICE O/P EST MOD 30 MIN: CPT | Performed by: PHYSICIAN ASSISTANT

## 2021-04-21 PROCEDURE — 3017F COLORECTAL CA SCREEN DOC REV: CPT | Performed by: PHYSICIAN ASSISTANT

## 2021-04-21 NOTE — PROGRESS NOTES
Shannan Luna is a 77 y.o. male  Chief Complaint   Patient presents with    Hypertension     follow up     Visit Vitals  /70   Pulse 66   Temp 98.3 °F (36.8 °C) (Temporal)   Resp 18   Ht 6' 1\" (1.854 m)   Wt 302 lb 3.2 oz (137.1 kg)   SpO2 95%   BMI 39.87 kg/m²      Health Maintenance Due   Topic Date Due    MICROALBUMIN Q1  Never done    Eye Exam Retinal or Dilated  Never done    DTaP/Tdap/Td series (1 - Tdap) Never done    Shingrix Vaccine Age 50> (1 of 2) Never done    Pneumococcal 65+ years (1 of 1 - PPSV23) Never done     HPI  HTN Follow up - BP controlled:  BP Readings from Last 3 Encounters:   04/21/21 128/70   01/08/21 136/60   01/04/21 (!) 142/80     Currently taking:   Key CAD CHF Meds             hydroCHLOROthiazide (HYDRODIURIL) 25 mg tablet (Taking) Take 1 Tab by mouth daily. flecainide (TAMBOCOR) 100 mg tablet (Taking) Take 100 mg by mouth two (2) times a day. PRN a fib    amLODIPine (NORVASC) 10 mg tablet (Taking) Take 1 Tab by mouth daily. Eliquis 5 mg tablet (Taking) TAKE 1 TABLET BY MOUTH TWICE A DAY    carvediloL (COREG) 25 mg tablet (Taking) Take 1 Tab by mouth two (2) times a day. Lab Results   Component Value Date/Time    Creatinine 1.26 01/05/2021 11:23 AM     DM II - controlled at last check. Lab Results   Component Value Date/Time    Hemoglobin A1c 6.5 (H) 01/05/2021 11:23 AM     Lab Results   Component Value Date/Time    Cholesterol, total 170 01/05/2021 11:23 AM    HDL Cholesterol 35 (L) 01/05/2021 11:23 AM    LDL, calculated 114 (H) 01/05/2021 11:23 AM    VLDL, calculated 21 01/05/2021 11:23 AM    Triglyceride 116 01/05/2021 11:23 AM     Currently taking:   Key Antihyperglycemic Medications     Patient is on no antihyperglycemic meds.         Riding bike 3-4 days 19-25 miles  [facility] [employer] [facility] 2-3 days per week  Wt Readings from Last 3 Encounters:   04/21/21 302 lb 3.2 oz (137.1 kg)   01/08/21 295 lb (133.8 kg)   01/04/21 293 lb (132.9 kg)   Has gained 9 lbs. Planning to work on diet. ROS  Review of Systems   Respiratory: Negative for shortness of breath. Cardiovascular: Negative for chest pain and palpitations. Neurological: Negative for dizziness, loss of consciousness and weakness. EXAM  Physical Exam  Vitals signs and nursing note reviewed. Constitutional:       General: He is not in acute distress. Appearance: He is well-developed. HENT:      Head: Normocephalic and atraumatic. Neck:      Musculoskeletal: Neck supple. Vascular: No JVD. Cardiovascular:      Rate and Rhythm: Normal rate and regular rhythm. Heart sounds: Normal heart sounds. Pulmonary:      Effort: Pulmonary effort is normal. No respiratory distress. Breath sounds: Normal breath sounds. Skin:     General: Skin is warm and dry. Neurological:      Mental Status: He is alert and oriented to person, place, and time. Psychiatric:         Mood and Affect: Mood normal.         Behavior: Behavior normal.         Thought Content: Thought content normal.         Judgment: Judgment normal.       ASSESSMENT/PLAN  Encounter Diagnoses     ICD-10-CM ICD-9-CM   1. Type 2 diabetes mellitus without complication, without long-term current use of insulin ([facility])  E11.9 250.00   2. Essential hypertension  I10 401.9   3. Paroxysmal atrial fibrillation ([facility])  I48.0 427.31   4. Severe obesity ([facility] [address]  E66.01 278.01     Orders Placed This Encounter    MICROALBUMIN, UR, RAND W/ MICROALB/CREAT RATIO     Congratulated pt on excellent exercise habits, diabetes, and HTN control. Continue routine follow ups with cardiology. Discussed diet for weight loss.

## 2021-04-21 NOTE — PROGRESS NOTES
.1. Have you been to the ER, urgent care clinic since your last visit? Hospitalized since your last visit? No    2. Have you seen or consulted any other health care providers outside of the [address] [place] [address] since your last visit? Include any pap smears or colon screening.  No   Chief Complaint   Patient presents with    Hypertension     follow up

## 2021-04-23 PROBLEM — R80.9 TYPE 2 DIABETES MELLITUS WITH MICROALBUMINURIA, WITHOUT LONG-TERM CURRENT USE OF INSULIN (HCC): Status: ACTIVE | Noted: 2021-01-08

## 2021-04-23 PROBLEM — E11.29 TYPE 2 DIABETES MELLITUS WITH MICROALBUMINURIA, WITHOUT LONG-TERM CURRENT USE OF INSULIN (HCC): Status: ACTIVE | Noted: 2021-01-08

## 2021-06-13 DIAGNOSIS — I10 ESSENTIAL HYPERTENSION: ICD-10-CM

## 2021-06-14 RX ORDER — AMLODIPINE BESYLATE 10 MG/1
TABLET ORAL
Qty: 90 TABLET | Refills: 1 | Status: SHIPPED | OUTPATIENT
Start: 2021-06-14 | End: 2021-09-17

## 2021-06-15 DIAGNOSIS — I10 ESSENTIAL HYPERTENSION: ICD-10-CM

## 2021-06-15 DIAGNOSIS — N40.1 BENIGN PROSTATIC HYPERPLASIA WITH LOWER URINARY TRACT SYMPTOMS, SYMPTOM DETAILS UNSPECIFIED: ICD-10-CM

## 2021-06-15 RX ORDER — FINASTERIDE 5 MG/1
TABLET, FILM COATED ORAL
Qty: 90 TABLET | Refills: 1 | Status: SHIPPED | OUTPATIENT
Start: 2021-06-15 | End: 2021-12-06

## 2021-06-15 RX ORDER — CARVEDILOL 25 MG/1
TABLET ORAL
Qty: 180 TABLET | Refills: 1 | Status: SHIPPED | OUTPATIENT
Start: 2021-06-15 | End: 2022-01-03

## 2021-06-17 DIAGNOSIS — N40.1 BENIGN PROSTATIC HYPERPLASIA WITH LOWER URINARY TRACT SYMPTOMS, SYMPTOM DETAILS UNSPECIFIED: ICD-10-CM

## 2021-06-18 RX ORDER — TAMSULOSIN HYDROCHLORIDE 0.4 MG/1
CAPSULE ORAL
Qty: 90 CAPSULE | Refills: 3 | Status: SHIPPED | OUTPATIENT
Start: 2021-06-18 | End: 2022-06-10

## 2021-07-14 ENCOUNTER — TELEPHONE (OUTPATIENT)
Dept: INTERNAL MEDICINE CLINIC | Age: 66
End: 2021-07-14

## 2021-07-14 NOTE — TELEPHONE ENCOUNTER
Requesting a call back to follow up on his tramadol refill request. He would also like his urine specimen results mailed to him       Callback required yes/no and why: yes- requested for an update       Best contact number(s): [phone]       shona

## 2021-07-16 ENCOUNTER — TELEPHONE (OUTPATIENT)
Dept: INTERNAL MEDICINE CLINIC | Age: 66
End: 2021-07-16

## 2021-07-16 DIAGNOSIS — M25.569 CHRONIC KNEE PAIN, UNSPECIFIED LATERALITY: ICD-10-CM

## 2021-07-16 DIAGNOSIS — G89.29 CHRONIC BILATERAL LOW BACK PAIN, UNSPECIFIED WHETHER SCIATICA PRESENT: ICD-10-CM

## 2021-07-16 DIAGNOSIS — M54.50 CHRONIC BILATERAL LOW BACK PAIN, UNSPECIFIED WHETHER SCIATICA PRESENT: ICD-10-CM

## 2021-07-16 DIAGNOSIS — G89.29 CHRONIC KNEE PAIN, UNSPECIFIED LATERALITY: ICD-10-CM

## 2021-07-16 RX ORDER — TRAMADOL HYDROCHLORIDE 50 MG/1
50 TABLET ORAL
Qty: 180 TABLET | Refills: 0 | Status: SHIPPED | OUTPATIENT
Start: 2021-07-16 | End: 2021-10-22

## 2021-07-16 NOTE — TELEPHONE ENCOUNTER
Spoke with patient States that he is taking tramadol 50 mg and sometimes every other day when needed for pain

## 2021-07-19 ENCOUNTER — OFFICE VISIT (OUTPATIENT)
Dept: INTERNAL MEDICINE CLINIC | Age: 66
End: 2021-07-19
Payer: MEDICARE

## 2021-07-19 VITALS
DIASTOLIC BLOOD PRESSURE: 100 MMHG | BODY MASS INDEX: 38.97 KG/M2 | HEART RATE: 66 BPM | OXYGEN SATURATION: 96 % | WEIGHT: 294 LBS | SYSTOLIC BLOOD PRESSURE: 146 MMHG | RESPIRATION RATE: 18 BRPM | TEMPERATURE: 98.3 F | HEIGHT: 73 IN

## 2021-07-19 DIAGNOSIS — G89.29 CHRONIC KNEE PAIN, UNSPECIFIED LATERALITY: ICD-10-CM

## 2021-07-19 DIAGNOSIS — Z79.891 CHRONIC PRESCRIPTION OPIATE USE: ICD-10-CM

## 2021-07-19 DIAGNOSIS — E11.29 TYPE 2 DIABETES MELLITUS WITH MICROALBUMINURIA, WITHOUT LONG-TERM CURRENT USE OF INSULIN (HCC): ICD-10-CM

## 2021-07-19 DIAGNOSIS — M25.569 CHRONIC KNEE PAIN, UNSPECIFIED LATERALITY: ICD-10-CM

## 2021-07-19 DIAGNOSIS — I10 ESSENTIAL HYPERTENSION: Primary | ICD-10-CM

## 2021-07-19 DIAGNOSIS — R80.9 TYPE 2 DIABETES MELLITUS WITH MICROALBUMINURIA, WITHOUT LONG-TERM CURRENT USE OF INSULIN (HCC): ICD-10-CM

## 2021-07-19 DIAGNOSIS — R60.0 EDEMA OF LEFT LOWER EXTREMITY: ICD-10-CM

## 2021-07-19 DIAGNOSIS — E66.01 SEVERE OBESITY (BMI 35.0-35.9 WITH COMORBIDITY) (HCC): ICD-10-CM

## 2021-07-19 PROCEDURE — 2022F DILAT RTA XM EVC RTNOPTHY: CPT | Performed by: PHYSICIAN ASSISTANT

## 2021-07-19 PROCEDURE — G8417 CALC BMI ABV UP PARAM F/U: HCPCS | Performed by: PHYSICIAN ASSISTANT

## 2021-07-19 PROCEDURE — 3044F HG A1C LEVEL LT 7.0%: CPT | Performed by: PHYSICIAN ASSISTANT

## 2021-07-19 PROCEDURE — G8536 NO DOC ELDER MAL SCRN: HCPCS | Performed by: PHYSICIAN ASSISTANT

## 2021-07-19 PROCEDURE — 99214 OFFICE O/P EST MOD 30 MIN: CPT | Performed by: PHYSICIAN ASSISTANT

## 2021-07-19 PROCEDURE — G8432 DEP SCR NOT DOC, RNG: HCPCS | Performed by: PHYSICIAN ASSISTANT

## 2021-07-19 PROCEDURE — 3017F COLORECTAL CA SCREEN DOC REV: CPT | Performed by: PHYSICIAN ASSISTANT

## 2021-07-19 PROCEDURE — G8753 SYS BP > OR = 140: HCPCS | Performed by: PHYSICIAN ASSISTANT

## 2021-07-19 PROCEDURE — G8427 DOCREV CUR MEDS BY ELIG CLIN: HCPCS | Performed by: PHYSICIAN ASSISTANT

## 2021-07-19 PROCEDURE — 1101F PT FALLS ASSESS-DOCD LE1/YR: CPT | Performed by: PHYSICIAN ASSISTANT

## 2021-07-19 PROCEDURE — G0463 HOSPITAL OUTPT CLINIC VISIT: HCPCS | Performed by: PHYSICIAN ASSISTANT

## 2021-07-19 PROCEDURE — G8755 DIAS BP > OR = 90: HCPCS | Performed by: PHYSICIAN ASSISTANT

## 2021-07-19 RX ORDER — HYDROCHLOROTHIAZIDE 50 MG/1
50 TABLET ORAL DAILY
Qty: 90 TABLET | Refills: 1 | Status: SHIPPED | OUTPATIENT
Start: 2021-07-19 | End: 2022-01-10

## 2021-07-19 NOTE — PROGRESS NOTES
1. Have you been to the ER, urgent care clinic since your last visit? Hospitalized since your last visit? No    2. Have you seen or consulted any other health care providers outside of the [address] [place] [address] since your last visit? Include any pap smears or colon screening.  No   Chief Complaint   Patient presents with    Ankle swelling     to left ankle and foot

## 2021-07-19 NOTE — PROGRESS NOTES
Nel Wagoner is a 74 y.o. male  Chief Complaint   Patient presents with    Ankle swelling     to left ankle and foot     Visit Vitals  BP (!) 146/100   Pulse 66   Temp 98.3 °F (36.8 °C) (Temporal)   Resp 18   Ht 6' 1\" (1.854 m)   Wt 294 lb (133.4 kg)   SpO2 96%   BMI 38.79 kg/m²      Health Maintenance Due   Topic Date Due    Eye Exam Retinal or Dilated  Never done    DTaP/Tdap/Td series (1 - Tdap) Never done    Shingrix Vaccine Age 50> (1 of 2) Never done    Pneumococcal 65+ years (1 of 1 - PPSV23) Never done       HPI  L ankle and foot swelling x months    Obesity -   Wt Readings from Last 3 Encounters:   07/19/21 294 lb (133.4 kg)   04/21/21 302 lb 3.2 oz (137.1 kg)   01/08/21 295 lb (133.8 kg)   Cycling for weight loss. Pleased with progress. HTN Follow up - BP uncontrolled and not improved at recheck. Pt notes BP at home has been high. He notes that he was previously on 50 mg of HCTZ. Lately taking only 25 mg. :  BP Readings from Last 3 Encounters:   07/19/21 (!) 144/98   04/21/21 128/70   01/08/21 136/60     Currently taking:   Key CAD CHF Meds             carvediloL (COREG) 25 mg tablet (Taking) TAKE 1 TABLET BY MOUTH TWICE A DAY    amLODIPine (NORVASC) 10 mg tablet (Taking) TAKE 1 TABLET BY MOUTH EVERY DAY    hydroCHLOROthiazide (HYDRODIURIL) 25 mg tablet (Taking) Take 1 Tab by mouth daily. flecainide (TAMBOCOR) 100 mg tablet (Taking) Take 100 mg by mouth two (2) times a day. PRN a fib    Eliquis 5 mg tablet (Taking) TAKE 1 TABLET BY MOUTH TWICE A DAY        ROS  Review of Systems   Constitutional: Negative for fever and malaise/fatigue. Respiratory: Negative for shortness of breath. Cardiovascular: Positive for leg swelling. Negative for chest pain and palpitations. Musculoskeletal: Positive for joint pain. Neurological: Negative for dizziness, loss of consciousness and weakness. Psychiatric/Behavioral: Negative for depression and substance abuse.      EXAM  Physical Exam  Vitals and nursing note reviewed. Constitutional:       General: He is not in acute distress. Appearance: He is well-developed. HENT:      Head: Normocephalic and atraumatic. Neck:      Vascular: No JVD. Cardiovascular:      Rate and Rhythm: Normal rate and regular rhythm. Heart sounds: Normal heart sounds. Pulmonary:      Effort: Pulmonary effort is normal. No respiratory distress. Breath sounds: Normal breath sounds. Musculoskeletal:      Cervical back: Neck supple. Skin:     General: Skin is warm and dry. Neurological:      Mental Status: He is alert and oriented to person, place, and time. Psychiatric:         Mood and Affect: Mood normal.         Behavior: Behavior normal.         Thought Content: Thought content normal.         Judgment: Judgment normal.       ASSESSMENT/PLAN  Encounter Diagnoses     ICD-10-CM ICD-9-CM   1. Essential hypertension  I10 401.9   2. Type 2 diabetes mellitus with microalbuminuria, without long-term current use of insulin (HCC)  E11.29 250.40    R80.9 791.0   3. Edema of left lower extremity  R60.0 782.3   4. Chronic knee pain, unspecified laterality  M25.569 719.46    G89.29 338.29   5. Chronic prescription opiate use  Z79.891 V58.69   6. Severe obesity (BMI 35.0-35.9 with comorbidity) ([facility] [address]  E66.01 278.01    Z68.35 V85.35     Orders Placed This Encounter    METABOLIC PANEL, COMPREHENSIVE    HEMOGLOBIN A1C WITH EAG    LIPID PANEL    Increase dose to: hydroCHLOROthiazide (HYDRODIURIL) 50 mg tablet   Ultrasound LLE ordered    Reviewed Tramadol labs. Encouraged pt to do labs when he has recently taken his tramadol next time. Congratulated pt on weight loss success and encouraged continued efforts!

## 2021-07-20 ENCOUNTER — HOSPITAL ENCOUNTER (OUTPATIENT)
Dept: ULTRASOUND IMAGING | Age: 66
Discharge: HOME | End: 2021-07-20
Attending: PHYSICIAN ASSISTANT
Payer: MEDICARE

## 2021-07-20 DIAGNOSIS — R60.0 EDEMA OF LEFT LOWER EXTREMITY: ICD-10-CM

## 2021-07-20 PROCEDURE — 93971 EXTREMITY STUDY: CPT

## 2021-07-21 ENCOUNTER — TELEPHONE (OUTPATIENT)
Dept: INTERNAL MEDICINE CLINIC | Age: 66
End: 2021-07-21

## 2021-07-22 LAB
ALBUMIN SERPL-MCNC: 4.4 G/DL (ref 3.8–4.8)
ALBUMIN/GLOB SERPL: 1.5 {RATIO} (ref 1.2–2.2)
ALP SERPL-CCNC: 82 IU/L (ref 48–121)
ALT SERPL-CCNC: 31 IU/L (ref 0–44)
AST SERPL-CCNC: 22 IU/L (ref 0–40)
BILIRUB SERPL-MCNC: 1.2 MG/DL (ref 0–1.2)
BUN SERPL-MCNC: 17 MG/DL (ref 8–27)
BUN/CREAT SERPL: 14 (ref 10–24)
CALCIUM SERPL-MCNC: 9.6 MG/DL (ref 8.6–10.2)
CHLORIDE SERPL-SCNC: 100 MMOL/L (ref 96–106)
CHOLEST SERPL-MCNC: 185 MG/DL (ref 100–199)
CO2 SERPL-SCNC: 27 MMOL/L (ref 20–29)
CREAT SERPL-MCNC: 1.19 MG/DL (ref 0.76–1.27)
EST. AVERAGE GLUCOSE BLD GHB EST-MCNC: 154 MG/DL
GLOBULIN SER CALC-MCNC: 2.9 G/DL (ref 1.5–4.5)
GLUCOSE SERPL-MCNC: 139 MG/DL (ref 65–99)
HBA1C MFR BLD: 7 % (ref 4.8–5.6)
HDLC SERPL-MCNC: 36 MG/DL
IMP & REVIEW OF LAB RESULTS: NORMAL
LDLC SERPL CALC-MCNC: 133 MG/DL (ref 0–99)
POTASSIUM SERPL-SCNC: 3.6 MMOL/L (ref 3.5–5.2)
PROT SERPL-MCNC: 7.3 G/DL (ref 6–8.5)
SODIUM SERPL-SCNC: 138 MMOL/L (ref 134–144)
TRIGL SERPL-MCNC: 85 MG/DL (ref 0–149)
VLDLC SERPL CALC-MCNC: 16 MG/DL (ref 5–40)

## 2021-08-10 ENCOUNTER — OFFICE VISIT (OUTPATIENT)
Dept: INTERNAL MEDICINE CLINIC | Age: 66
End: 2021-08-10
Payer: MEDICARE

## 2021-08-10 VITALS
SYSTOLIC BLOOD PRESSURE: 138 MMHG | DIASTOLIC BLOOD PRESSURE: 88 MMHG | TEMPERATURE: 98.6 F | HEART RATE: 58 BPM | BODY MASS INDEX: 37.91 KG/M2 | WEIGHT: 286 LBS | OXYGEN SATURATION: 98 % | HEIGHT: 73 IN | RESPIRATION RATE: 16 BRPM

## 2021-08-10 DIAGNOSIS — I10 ESSENTIAL HYPERTENSION: ICD-10-CM

## 2021-08-10 DIAGNOSIS — G89.29 CHRONIC KNEE PAIN, UNSPECIFIED LATERALITY: ICD-10-CM

## 2021-08-10 DIAGNOSIS — E78.00 HYPERCHOLESTEREMIA: ICD-10-CM

## 2021-08-10 DIAGNOSIS — R80.9 TYPE 2 DIABETES MELLITUS WITH MICROALBUMINURIA, WITHOUT LONG-TERM CURRENT USE OF INSULIN (HCC): ICD-10-CM

## 2021-08-10 DIAGNOSIS — M25.569 CHRONIC KNEE PAIN, UNSPECIFIED LATERALITY: ICD-10-CM

## 2021-08-10 DIAGNOSIS — E66.01 SEVERE OBESITY (HCC): ICD-10-CM

## 2021-08-10 DIAGNOSIS — E11.29 TYPE 2 DIABETES MELLITUS WITH MICROALBUMINURIA, WITHOUT LONG-TERM CURRENT USE OF INSULIN (HCC): ICD-10-CM

## 2021-08-10 DIAGNOSIS — Z23 ENCOUNTER FOR IMMUNIZATION: Primary | ICD-10-CM

## 2021-08-10 PROCEDURE — 1101F PT FALLS ASSESS-DOCD LE1/YR: CPT | Performed by: PHYSICIAN ASSISTANT

## 2021-08-10 PROCEDURE — 2022F DILAT RTA XM EVC RTNOPTHY: CPT | Performed by: PHYSICIAN ASSISTANT

## 2021-08-10 PROCEDURE — G0463 HOSPITAL OUTPT CLINIC VISIT: HCPCS | Performed by: PHYSICIAN ASSISTANT

## 2021-08-10 PROCEDURE — G8536 NO DOC ELDER MAL SCRN: HCPCS | Performed by: PHYSICIAN ASSISTANT

## 2021-08-10 PROCEDURE — G8427 DOCREV CUR MEDS BY ELIG CLIN: HCPCS | Performed by: PHYSICIAN ASSISTANT

## 2021-08-10 PROCEDURE — 3017F COLORECTAL CA SCREEN DOC REV: CPT | Performed by: PHYSICIAN ASSISTANT

## 2021-08-10 PROCEDURE — G8417 CALC BMI ABV UP PARAM F/U: HCPCS | Performed by: PHYSICIAN ASSISTANT

## 2021-08-10 PROCEDURE — G8754 DIAS BP LESS 90: HCPCS | Performed by: PHYSICIAN ASSISTANT

## 2021-08-10 PROCEDURE — G8432 DEP SCR NOT DOC, RNG: HCPCS | Performed by: PHYSICIAN ASSISTANT

## 2021-08-10 PROCEDURE — 99214 OFFICE O/P EST MOD 30 MIN: CPT | Performed by: PHYSICIAN ASSISTANT

## 2021-08-10 PROCEDURE — G8752 SYS BP LESS 140: HCPCS | Performed by: PHYSICIAN ASSISTANT

## 2021-08-10 RX ORDER — ZOSTER VACCINE RECOMBINANT, ADJUVANTED 50 MCG/0.5
KIT INTRAMUSCULAR
Qty: 0.5 ML | Refills: 1 | Status: SHIPPED | OUTPATIENT
Start: 2021-08-10 | End: 2021-12-06

## 2021-08-10 RX ORDER — SIMVASTATIN 20 MG/1
20 TABLET, FILM COATED ORAL DAILY
Qty: 30 TABLET | Refills: 5 | Status: SHIPPED | OUTPATIENT
Start: 2021-08-10 | End: 2021-10-05 | Stop reason: SDUPTHER

## 2021-08-10 RX ORDER — DICLOFENAC SODIUM 10 MG/G
4 GEL TOPICAL 4 TIMES DAILY
Qty: 450 G | Refills: 5 | Status: SHIPPED | OUTPATIENT
Start: 2021-08-10

## 2021-08-10 NOTE — PROGRESS NOTES
Brian Morris is a 74 y.o. male  Chief Complaint   Patient presents with    Diabetes     follow up    Hypertension     follow up     Visit Vitals  /88   Pulse (!) 58   Temp 98.6 °F (37 °C) (Temporal)   Resp 16   Ht 6' 1\" (1.854 m)   Wt 286 lb (129.7 kg)   SpO2 98%   BMI 37.73 kg/m²      Health Maintenance Due   Topic Date Due    Eye Exam Retinal or Dilated  Never done    DTaP/Tdap/Td series (1 - Tdap) Never done    Shingrix Vaccine Age 50> (1 of 2) Never done    Pneumococcal 65+ years (1 of 1 - PPSV23) Never done       HPI  DM II - diet controlled. LDL not at goal.    Lab Results   Component Value Date/Time    Hemoglobin A1c 7.0 (H) 07/21/2021 07:32 AM    Hemoglobin A1c 6.5 (H) 01/05/2021 11:23 AM    Glucose 139 (H) 07/21/2021 07:32 AM    Microalbumin/Creat ratio (mg/g creat) 161 (H) 04/21/2021 12:36 PM    Microalbumin,urine random 11.20 04/21/2021 12:36 PM    LDL, calculated 133 (H) 07/21/2021 07:32 AM    Creatinine 1.19 07/21/2021 07:32 AM     Currently taking:   Key Antihyperglycemic Medications     Patient is on no antihyperglycemic meds. Pt is pleased with weight loss with exercise. Wt Readings from Last 3 Encounters:   08/10/21 286 lb (129.7 kg)   07/19/21 294 lb (133.4 kg)   04/21/21 302 lb 3.2 oz (137.1 kg)     HTN Follow up - BP controlled:  BP Readings from Last 3 Encounters:   08/10/21 138/88   07/19/21 (!) 146/100   04/21/21 128/70     Currently taking:   Key CAD CHF Meds             hydroCHLOROthiazide (HYDRODIURIL) 50 mg tablet (Taking) Take 1 Tablet by mouth daily. carvediloL (COREG) 25 mg tablet (Taking) TAKE 1 TABLET BY MOUTH TWICE A DAY    amLODIPine (NORVASC) 10 mg tablet (Taking) TAKE 1 TABLET BY MOUTH EVERY DAY    flecainide (TAMBOCOR) 100 mg tablet (Taking) Take 100 mg by mouth two (2) times a day. PRN a fib    Eliquis 5 mg tablet (Taking) TAKE 1 TABLET BY MOUTH TWICE A DAY        Knee pain is improved with Votaren gel. Would like refill.      ROS  Review of Systems Respiratory: Negative for shortness of breath. Cardiovascular: Negative for chest pain and palpitations. Neurological: Negative for dizziness, loss of consciousness and weakness. EXAM  Physical Exam  Vitals and nursing note reviewed. Constitutional:       General: He is not in acute distress. Appearance: He is well-developed. HENT:      Head: Normocephalic and atraumatic. Neck:      Vascular: No JVD. Cardiovascular:      Rate and Rhythm: Normal rate and regular rhythm. Heart sounds: Normal heart sounds. Pulmonary:      Effort: Pulmonary effort is normal. No respiratory distress. Breath sounds: Normal breath sounds. Musculoskeletal:      Cervical back: Neck supple. Skin:     General: Skin is warm and dry. Neurological:      Mental Status: He is alert and oriented to person, place, and time. Psychiatric:         Mood and Affect: Mood normal.         Behavior: Behavior normal.         Thought Content: Thought content normal.         Judgment: Judgment normal.       ASSESSMENT/PLAN  Encounter Diagnoses     ICD-10-CM ICD-9-CM   1. Encounter for immunization  Z23 V03.89   2. Chronic knee pain, unspecified laterality  M25.569 719.46    G89.29 338.29   3. Type 2 diabetes mellitus with microalbuminuria, without long-term current use of insulin (HCC)  E11.29 250.40    R80.9 791.0   4. Essential hypertension  I10 401.9   5. Severe obesity ([address]  E66.01 278.01   6. Hypercholesteremia  E78.00 272.0     Orders Placed This Encounter    Add simvastatin (ZOCOR) 20 mg tablet    Refill diclofenac (VOLTAREN) 1 % gel    varicella-zoster recombinant, PF, (Shingrix, PF,) 50 mcg/0.5 mL susr injection       Congratulated pt on weight loss success and encouraged continued efforts!

## 2021-08-10 NOTE — PROGRESS NOTES
1. Have you been to the ER, urgent care clinic since your last visit? Hospitalized since your last visit? No    2. Have you seen or consulted any other health care providers outside of the [address] [place] [address] since your last visit? Include any pap smears or colon screening.  Yes    Chief Complaint   Patient presents with    Diabetes     follow up    Hypertension     follow up

## 2021-08-11 ENCOUNTER — TELEPHONE (OUTPATIENT)
Dept: INTERNAL MEDICINE CLINIC | Age: 66
End: 2021-08-11

## 2021-08-11 NOTE — TELEPHONE ENCOUNTER
Please inform him that it is unlikely that he needs a referral for this as it is a normal screenign thing, but to find an in network provider, he should call his insurance company to get their list.

## 2021-09-16 DIAGNOSIS — I10 ESSENTIAL HYPERTENSION: ICD-10-CM

## 2021-09-17 RX ORDER — AMLODIPINE BESYLATE 10 MG/1
TABLET ORAL
Qty: 90 TABLET | Refills: 1 | Status: SHIPPED | OUTPATIENT
Start: 2021-09-17 | End: 2022-06-10

## 2021-10-05 DIAGNOSIS — E78.00 HYPERCHOLESTEREMIA: ICD-10-CM

## 2021-10-05 RX ORDER — SIMVASTATIN 20 MG/1
20 TABLET, FILM COATED ORAL DAILY
Qty: 30 TABLET | Refills: 5 | Status: SHIPPED | OUTPATIENT
Start: 2021-10-05 | End: 2021-10-29

## 2021-10-05 NOTE — TELEPHONE ENCOUNTER
Reviewed record in preparation for visit and have obtained necessary documentation. Identified pt with two pt identifiers(name and ). Chief Complaint   Patient presents with    Medication Refill     @V@      [unfilled]    Mr. Momo Downing has a reminder for a \"due or due soon\" health maintenance. I have asked that he discuss this further with his primary care provider for follow-up on this health maintenance. Coordination of Care Questionnaire:  :     1) Have you been to an emergency room, urgent care clinic since your last visit? {yes/no default no:::\"no\"}   Hospitalized since your last visit? {yes/no date began:}             2) Have you seen or consulted any other health care providers outside of [address] [place] [address] since your last visit? {yes/no date began:}  (Include any pap smears or colon screenings in this section.)    3) In the event something were to happen to you and you were unable to speak on your behalf, do you have an Advance Directive/ Living Will in place stating your wishes?  {YES/NO:[id number]}

## 2021-10-05 NOTE — TELEPHONE ENCOUNTER
Future Appointments:  Future Appointments   Date Time Provider Sierra Briceño   2/11/2022 10:00 AM MART Garay BS AMB        Last Appointment With Me:  8/10/2021     Requested Prescriptions     Pending Prescriptions Disp Refills    simvastatin (ZOCOR) 20 mg tablet 30 Tablet 5     Sig: Take 1 Tablet by mouth daily.

## 2021-10-14 DIAGNOSIS — G89.29 CHRONIC KNEE PAIN, UNSPECIFIED LATERALITY: ICD-10-CM

## 2021-10-14 DIAGNOSIS — M54.50 CHRONIC BILATERAL LOW BACK PAIN, UNSPECIFIED WHETHER SCIATICA PRESENT: ICD-10-CM

## 2021-10-14 DIAGNOSIS — G89.29 CHRONIC BILATERAL LOW BACK PAIN, UNSPECIFIED WHETHER SCIATICA PRESENT: ICD-10-CM

## 2021-10-14 DIAGNOSIS — M25.569 CHRONIC KNEE PAIN, UNSPECIFIED LATERALITY: ICD-10-CM

## 2021-10-15 DIAGNOSIS — G89.29 CHRONIC KNEE PAIN, UNSPECIFIED LATERALITY: ICD-10-CM

## 2021-10-15 DIAGNOSIS — M25.569 CHRONIC KNEE PAIN, UNSPECIFIED LATERALITY: ICD-10-CM

## 2021-10-15 DIAGNOSIS — G89.29 CHRONIC BILATERAL LOW BACK PAIN, UNSPECIFIED WHETHER SCIATICA PRESENT: ICD-10-CM

## 2021-10-15 DIAGNOSIS — M54.50 CHRONIC BILATERAL LOW BACK PAIN, UNSPECIFIED WHETHER SCIATICA PRESENT: ICD-10-CM

## 2021-10-15 RX ORDER — TRAMADOL HYDROCHLORIDE 50 MG/1
50 TABLET ORAL
Qty: 180 TABLET | Refills: 0 | OUTPATIENT
Start: 2021-10-15 | End: 2022-04-13

## 2021-10-15 NOTE — TELEPHONE ENCOUNTER
Spoke with patient. Advised patient that since his last tramadol lab was negative. Will need to redo lab.  Patient scheduled an appointment to discuss with provider

## 2021-10-18 ENCOUNTER — OFFICE VISIT (OUTPATIENT)
Dept: INTERNAL MEDICINE CLINIC | Age: 66
End: 2021-10-18
Payer: MEDICARE

## 2021-10-18 VITALS
WEIGHT: 283 LBS | RESPIRATION RATE: 18 BRPM | BODY MASS INDEX: 37.51 KG/M2 | HEIGHT: 73 IN | OXYGEN SATURATION: 96 % | SYSTOLIC BLOOD PRESSURE: 142 MMHG | HEART RATE: 65 BPM | TEMPERATURE: 98.2 F | DIASTOLIC BLOOD PRESSURE: 78 MMHG

## 2021-10-18 DIAGNOSIS — I48.0 PAROXYSMAL ATRIAL FIBRILLATION (HCC): ICD-10-CM

## 2021-10-18 DIAGNOSIS — G89.29 CHRONIC BILATERAL LOW BACK PAIN, UNSPECIFIED WHETHER SCIATICA PRESENT: ICD-10-CM

## 2021-10-18 DIAGNOSIS — Z11.52 ENCOUNTER FOR SCREENING FOR COVID-19: ICD-10-CM

## 2021-10-18 DIAGNOSIS — Z00.00 ENCOUNTER FOR MEDICARE ANNUAL WELLNESS EXAM: Primary | ICD-10-CM

## 2021-10-18 DIAGNOSIS — E66.01 SEVERE OBESITY (HCC): ICD-10-CM

## 2021-10-18 DIAGNOSIS — M25.569 CHRONIC KNEE PAIN, UNSPECIFIED LATERALITY: ICD-10-CM

## 2021-10-18 DIAGNOSIS — Z23 ENCOUNTER FOR IMMUNIZATION: ICD-10-CM

## 2021-10-18 DIAGNOSIS — I10 ESSENTIAL HYPERTENSION: ICD-10-CM

## 2021-10-18 DIAGNOSIS — G89.29 CHRONIC KNEE PAIN, UNSPECIFIED LATERALITY: ICD-10-CM

## 2021-10-18 DIAGNOSIS — M54.50 CHRONIC BILATERAL LOW BACK PAIN, UNSPECIFIED WHETHER SCIATICA PRESENT: ICD-10-CM

## 2021-10-18 DIAGNOSIS — Z79.891 CHRONIC PRESCRIPTION OPIATE USE: ICD-10-CM

## 2021-10-18 PROCEDURE — 3017F COLORECTAL CA SCREEN DOC REV: CPT | Performed by: PHYSICIAN ASSISTANT

## 2021-10-18 PROCEDURE — 1101F PT FALLS ASSESS-DOCD LE1/YR: CPT | Performed by: PHYSICIAN ASSISTANT

## 2021-10-18 PROCEDURE — G8417 CALC BMI ABV UP PARAM F/U: HCPCS | Performed by: PHYSICIAN ASSISTANT

## 2021-10-18 PROCEDURE — G0439 PPPS, SUBSEQ VISIT: HCPCS | Performed by: PHYSICIAN ASSISTANT

## 2021-10-18 PROCEDURE — G0009 ADMIN PNEUMOCOCCAL VACCINE: HCPCS | Performed by: PHYSICIAN ASSISTANT

## 2021-10-18 PROCEDURE — G8536 NO DOC ELDER MAL SCRN: HCPCS | Performed by: PHYSICIAN ASSISTANT

## 2021-10-18 PROCEDURE — G8754 DIAS BP LESS 90: HCPCS | Performed by: PHYSICIAN ASSISTANT

## 2021-10-18 PROCEDURE — G8753 SYS BP > OR = 140: HCPCS | Performed by: PHYSICIAN ASSISTANT

## 2021-10-18 PROCEDURE — G8432 DEP SCR NOT DOC, RNG: HCPCS | Performed by: PHYSICIAN ASSISTANT

## 2021-10-18 PROCEDURE — G8427 DOCREV CUR MEDS BY ELIG CLIN: HCPCS | Performed by: PHYSICIAN ASSISTANT

## 2021-10-18 PROCEDURE — 99213 OFFICE O/P EST LOW 20 MIN: CPT | Performed by: PHYSICIAN ASSISTANT

## 2021-10-18 NOTE — PROGRESS NOTES
Gil Cali is a 74 y.o. male  Chief Complaint   Patient presents with    Medication Refill     Visit Vitals  BP (!) 142/78   Pulse 65   Temp 98.2 °F (36.8 °C) (Temporal)   Resp 18   Ht 6' 1\" (1.854 m)   Wt 283 lb (128.4 kg)   SpO2 96%   BMI 37.34 kg/m²      Health Maintenance Due   Topic Date Due    Eye Exam Retinal or Dilated  Never done    DTaP/Tdap/Td series (1 - Tdap) Never done    Pneumococcal 65+ years (1 of 1 - PPSV23) Never done    Flu Vaccine (1) Never done    Shingrix Vaccine Age 50> (2 of 2) 10/05/2021    Medicare Yearly Exam  10/22/2021       HPI  Tramadol refill - tested negative on UA for Tramadol at last check 7/21. Needs retesting before Rx. Pt notes he took last Tramadol yesterday. Requests COVID test due to exposure to daughter who travels frequently. Pt has been exercising regularly and has lost weight! Wt Readings from Last 3 Encounters:   10/18/21 283 lb (128.4 kg)   08/10/21 286 lb (129.7 kg)   07/19/21 294 lb (133.4 kg)     BP is slightly high today, but pt is stressed with Tramadol refill situation. ROS  Review of Systems   Respiratory: Negative for shortness of breath. Cardiovascular: Negative for chest pain and palpitations. Musculoskeletal: Positive for joint pain. Negative for falls. Neurological: Negative for dizziness, loss of consciousness and weakness. Psychiatric/Behavioral: Negative for depression and substance abuse. EXAM  Physical Exam  Vitals and nursing note reviewed. Constitutional:       General: He is not in acute distress. Appearance: He is well-developed. HENT:      Head: Normocephalic and atraumatic. Neck:      Vascular: No JVD. Cardiovascular:      Rate and Rhythm: Normal rate and regular rhythm. Heart sounds: Normal heart sounds. Pulmonary:      Effort: Pulmonary effort is normal. No respiratory distress. Breath sounds: Normal breath sounds. Musculoskeletal:      Cervical back: Neck supple.    Skin: General: Skin is warm and dry. Neurological:      Mental Status: He is alert and oriented to person, place, and time. Psychiatric:         Mood and Affect: Mood normal.         Behavior: Behavior normal.         Thought Content: Thought content normal.         Judgment: Judgment normal.       ASSESSMENT/PLAN  Encounter Diagnoses     ICD-10-CM ICD-9-CM   1. Encounter for Medicare annual wellness exam  Z00.00 V70.0   2. Type 2 diabetes mellitus with microalbuminuria, without long-term current use of insulin (HCC)  E11.29 250.40    R80.9 791.0   3. Severe obesity ([facility] [address]  E66.01 278.01   4. Paroxysmal atrial fibrillation (HCC)  I48.0 427.31   5. Essential hypertension  I10 401.9   6. Chronic bilateral low back pain, unspecified whether sciatica present  M54.50 724.2    G89.29 338.29   7. Chronic knee pain, unspecified laterality  M25.569 719.46    G89.29 338.29   8. Chronic prescription opiate use  Z79.891 V58.69   9. Encounter for screening for COVID-19  Z11.52 V73.89     Orders Placed This Encounter    Pneumococcal Polysaccharide vaccine, 23-Valent, Adult or Immunocompromised    NOVEL CORONAVIRUS (COVID-19) ([facility])     Pt will have Tramadol UDS done today. If positive, I can then refill Tramadol. Long discussion.

## 2021-10-18 NOTE — PROGRESS NOTES
1. Have you been to the ER, urgent care clinic since your last visit? Hospitalized since your last visit? No    2. Have you seen or consulted any other health care providers outside of the [address] [place] [address] since your last visit? Include any pap smears or colon screening.  No   Chief Complaint   Patient presents with    Medication Refill

## 2021-10-18 NOTE — PATIENT INSTRUCTIONS
Vaccine Information Statement    Pneumococcal Polysaccharide Vaccine (PPSV23): What You Need to Know    Many Vaccine Information Statements are available in [place] and other languages. See www.immunize.org/vis  Hojas de información sobre vacunas están disponibles en español y en muchos otros idiomas. Visite www.immunize.org/vis    1. Why get vaccinated? Pneumococcal polysaccharide vaccine (PPSV23) can prevent pneumococcal disease. Pneumococcal disease refers to any illness caused by pneumococcal bacteria. These bacteria can cause many types of illnesses, including pneumonia, which is an infection of the lungs. Pneumococcal bacteria are one of the most common causes of pneumonia. Besides pneumonia, pneumococcal bacteria can also cause:   Ear infections   Sinus infections   Meningitis (infection of the tissue covering the brain and spinal cord)   Bacteremia (bloodstream infection)    Anyone can get pneumococcal disease, but children under 3years of age, people with certain medical conditions, adults 72 years or older, and cigarette smokers are at the highest risk. Most pneumococcal infections are mild. However, some can result in long-term problems, such as brain damage or hearing loss. Meningitis, bacteremia, and pneumonia caused by pneumococcal disease can be fatal.     2. PPSV23     PPSV23 protects against 23 types of bacteria that cause pneumococcal disease. PPSV23 is recommended for:   All adults 72 years or older,   Anyone 2 years or older with certain medical conditions that can lead to an increased risk for pneumococcal disease. Most people need only one dose of PPSV23. A second dose of PPSV23, and another type of pneumococcal vaccine called PCV13, are recommended for certain high-risk groups. Your health care provider can give you more information.     People 65 years or older should get a dose of PPSV23 even if they have already gotten one or more doses of the vaccine before they turned 72.    3. Talk with your health care provider    Tell your vaccine provider if the person getting the vaccine:   Has had an allergic reaction after a previous dose of PPSV23, or has any severe, life-threatening allergies. In some cases, your health care provider may decide to postpone PPSV23 vaccination to a future visit. People with minor illnesses, such as a cold, may be vaccinated. People who are moderately or severely ill should usually wait until they recover before getting PPSV23. Your health care provider can give you more information. 4. Risks of a vaccine reaction     Redness or pain where the shot is given, feeling tired, fever, or muscle aches can happen after PPSV23. People sometimes faint after medical procedures, including vaccination. Tell your provider if you feel dizzy or have vision changes or ringing in the ears. As with any medicine, there is a very remote chance of a vaccine causing a severe allergic reaction, other serious injury, or death. 5. What if there is a serious problem? An allergic reaction could occur after the vaccinated person leaves the clinic. If you see signs of a severe allergic reaction (hives, swelling of the face and throat, difficulty breathing, a fast heartbeat, dizziness, or weakness), call 9-1-1 and get the person to the nearest hospital.    For other signs that concern you, call your health care provider. Adverse reactions should be reported to the Vaccine Adverse Event Reporting System (VAERS). Your health care provider will usually file this report, or you can do it yourself. Visit the VAERS website at www.vaers. hhs.gov or call 4-232.810.6318. VAERS is only for reporting reactions, and VAERS staff do not give medical advice. 6. How can I learn more?  Ask your health care provider.  Call your local or state health department.    Contact the Centers for Disease Control and Prevention (CDC):  - Call 4-218.665.7022 (8-769-MDT-INFO) or  - Visit CDCs website at www.cdc.gov/vaccines    Vaccine Information Statement   PPSV23   10/30/2019    [facility] for Disease Control and Prevention    Office Use Only

## 2021-10-18 NOTE — PROGRESS NOTES
This is the Subsequent Medicare Annual Wellness Exam, performed 12 months or more after the Initial AWV or the last Subsequent AWV  Health Maintenance Due   Topic Date Due    Eye Exam Retinal or Dilated  Never done    DTaP/Tdap/Td series (1 - Tdap) Never done    Pneumococcal 65+ years (1 of 1 - PPSV23) Never done    Flu Vaccine (1) Never done    Shingrix Vaccine Age 50> (2 of 2) 10/05/2021    Medicare Yearly Exam  10/22/2021     Depression Risk Factor Screening:     3 most recent PHQ Screens 10/18/2021   Little interest or pleasure in doing things Not at all   Feeling down, depressed, irritable, or hopeless Not at all   Total Score PHQ 2 0       Abuse Screen  Patient is not abused    Fall Risk  Fall Risk Assessment, last 12 mths 10/18/2021   Able to walk? Yes   Fall in past 12 months? 0   Do you feel unsteady? 0   Are you worried about falling 0       Alcohol Risk Factor Screening:    reports previous alcohol use. None    Functional Ability and Level of Safety:   Hearing Loss  Hearing is good. Activities of Daily Living  The home contains: handrails  Patient does total self care     Cognitive Screening   Evaluation of Cognitive Function:  Has your family/caregiver stated any concerns about your memory: no     Patient Care Team   Patient Care Team:  Sandee Lam as PCP - General (Physician Assistant)  Milena Kohli PA-C as PCP - [facility] Empaneled Provider  Demarco Mendoza MD (Cardiology)  Aren Hopson MD (Orthopedic Surgery)  Leif oBse MD (Urology)    Assessment/Plan   Education and counseling provided:  Are appropriate based on today's review and evaluation    Extended Emergency Contact Information  Primary Emergency Contact: [address] [place] [address] Phone: [phone]  Relation: Daughter  Secondary Emergency Contact: [address] [place] [address],  [address] Phone: [phone]  Relation: Son    I have reviewed the patient's medical history in detail and updated the computerized patient record.      History History reviewed. No pertinent past medical history. History reviewed. No pertinent surgical history. Current Outpatient Medications   Medication Sig Dispense Refill    simvastatin (ZOCOR) 20 mg tablet Take 1 Tablet by mouth daily. 30 Tablet 5    amLODIPine (NORVASC) 10 mg tablet TAKE 1 TABLET BY MOUTH EVERY DAY 90 Tablet 1    diclofenac (VOLTAREN) 1 % gel Apply 4 g to affected area four (4) times daily. 450 g 5    hydroCHLOROthiazide (HYDRODIURIL) 50 mg tablet Take 1 Tablet by mouth daily. 90 Tablet 1    traMADoL (ULTRAM) 50 mg tablet Take 1 Tablet by mouth two (2) times daily as needed for Pain for up to 180 days. Max Daily Amount: 100 mg. 180 Tablet 0    tamsulosin (FLOMAX) 0.4 mg capsule TAKE 1 CAPSULE BY MOUTH EVERY DAY 90 Capsule 3    carvediloL (COREG) 25 mg tablet TAKE 1 TABLET BY MOUTH TWICE A  Tablet 1    finasteride (PROSCAR) 5 mg tablet TAKE 1 TABLET BY MOUTH EVERY DAY 90 Tablet 1    flecainide (TAMBOCOR) 100 mg tablet Take 100 mg by mouth two (2) times a day. PRN a fib      melatonin 5 mg tablet Take 15 mg every evening for sleep.  Eliquis 5 mg tablet TAKE 1 TABLET BY MOUTH TWICE A  Tab 3    varicella-zoster recombinant, PF, (Shingrix, PF,) 50 mcg/0.5 mL susr injection Please administer once every 6 months for two doses and send record.  Thanks! (Patient not taking: Reported on 10/18/2021) 0.5 mL 1     Allergies   Allergen Reactions    Lisinopril Swelling     Family History   Problem Relation Age of Onset    Cancer Mother     Hypertension Father     Hypertension Sister     Diabetes Sister      Social History     Tobacco Use    Smoking status: Never Smoker    Smokeless tobacco: Never Used   Substance Use Topics    Alcohol use: Not Currently     Patient Active Problem List   Diagnosis Code    Essential hypertension I10    Paroxysmal atrial fibrillation (HCC) I48.0    Benign prostatic hyperplasia with lower urinary tract symptoms, symptom details unspecified N40.1    Chronic bilateral low back pain, unspecified whether sciatica present M54.50, G89.29    Chronic knee pain, unspecified laterality M25.569, G89.29    Chronic prescription opiate use Z79.891    Severe obesity ([facility]) E66.01    Type 2 diabetes mellitus with microalbuminuria, without long-term current use of insulin ([facility]) E11.29, R80.9

## 2021-10-19 LAB
SARS-COV-2, NAA 2 DAY TAT: NORMAL
SARS-COV-2, NAA: NOT DETECTED

## 2021-10-20 ENCOUNTER — NURSE TRIAGE (OUTPATIENT)
Dept: OTHER | Facility: CLINIC | Age: 66
End: 2021-10-20

## 2021-10-20 NOTE — TELEPHONE ENCOUNTER
Received call from Jennifer Gonsalez at [facility] with Red Flag Complaint. Pt disconnected with ECC. Called pt back. Brief description of triage: hypertension    Triage indicates for patient to see PCP within 2 weeks since he stated wanted blood pressure check. Did not have a current blood pressure. Disposition changed, triaged up due to concern for soreness chest after weight lifting and receiving pneumonia vaccine. Denies pain worse with palpation, deep breaths, exertion, movement. Non-radiating. No shortness of breath. Care advice provided, patient verbalizes understanding; denies any other questions or concerns; instructed to call back for any new or worsening symptoms. Writer provided warm transfer to [place] at [facility] for appointment scheduling. Attention Provider: Thank you for allowing me to participate in the care of your patient. The patient was connected to triage in response to information provided to the ECC. Please do not respond through this encounter as the response is not directed to a shared pool. Reason for Disposition   [0] Systolic BP  >= 979 OR Diastolic >= 80 AND [1] taking BP medications    Answer Assessment - Initial Assessment Questions  1. BLOOD PRESSURE: \"What is the blood pressure? \" \"Did you take at least two measurements 5 minutes apart? \"      Monday 151/91 prior to getting pneumonia vaccine. Today took blood pressure and blood pressure machine was not reading correctly. Pt wants to come in and get his blood pressure checked. Pulse 81.     2. ONSET: \"When did you take your blood pressure? \"      Monday. 3. HOW: \"How did you obtain the blood pressure? \" (e.g., visiting nurse, automatic home BP monitor)      Automatically    4. HISTORY: \"Do you have a history of high blood pressure? \"      Yes    5. MEDICATIONS: Ranjeet Ponce you taking any medications for blood pressure? \" \"Have you missed any doses recently? \"      Compliant with medications    6.  OTHER SYMPTOMS: \"Do you have any symptoms? \" (e.g., headache, chest pain, blurred vision, difficulty breathing, weakness)      Received pneumonia vaccine Monday. States has sinus congestion. Denies chest pain but states had a soreness sensation left side of chest x1.5 days after weight lifting. Non-radiating. Not worse when moving arm. Not worse with exertion, not worse with deep breaths. Not worse with palpation. 1/10 states. Denies shortness of breath. No cough. Denies vision changes. Denies dizziness. 7. PREGNANCY: \"Is there any chance you are pregnant? \" \"When was your last menstrual period? \"      n/a    Protocols used: HIGH BLOOD PRESSURE-ADULT-AH

## 2021-10-21 ENCOUNTER — OFFICE VISIT (OUTPATIENT)
Dept: INTERNAL MEDICINE CLINIC | Age: 66
End: 2021-10-21
Payer: MEDICARE

## 2021-10-21 VITALS
HEIGHT: 73 IN | TEMPERATURE: 98.4 F | BODY MASS INDEX: 38.17 KG/M2 | RESPIRATION RATE: 20 BRPM | SYSTOLIC BLOOD PRESSURE: 139 MMHG | DIASTOLIC BLOOD PRESSURE: 88 MMHG | WEIGHT: 288 LBS | HEART RATE: 61 BPM | OXYGEN SATURATION: 96 %

## 2021-10-21 DIAGNOSIS — I10 ESSENTIAL HYPERTENSION: Primary | ICD-10-CM

## 2021-10-21 PROCEDURE — G8432 DEP SCR NOT DOC, RNG: HCPCS | Performed by: PHYSICIAN ASSISTANT

## 2021-10-21 PROCEDURE — 3017F COLORECTAL CA SCREEN DOC REV: CPT | Performed by: PHYSICIAN ASSISTANT

## 2021-10-21 PROCEDURE — 1101F PT FALLS ASSESS-DOCD LE1/YR: CPT | Performed by: PHYSICIAN ASSISTANT

## 2021-10-21 PROCEDURE — G8752 SYS BP LESS 140: HCPCS | Performed by: PHYSICIAN ASSISTANT

## 2021-10-21 PROCEDURE — G8417 CALC BMI ABV UP PARAM F/U: HCPCS | Performed by: PHYSICIAN ASSISTANT

## 2021-10-21 PROCEDURE — 99213 OFFICE O/P EST LOW 20 MIN: CPT | Performed by: PHYSICIAN ASSISTANT

## 2021-10-21 PROCEDURE — G8536 NO DOC ELDER MAL SCRN: HCPCS | Performed by: PHYSICIAN ASSISTANT

## 2021-10-21 PROCEDURE — G8427 DOCREV CUR MEDS BY ELIG CLIN: HCPCS | Performed by: PHYSICIAN ASSISTANT

## 2021-10-21 PROCEDURE — G8754 DIAS BP LESS 90: HCPCS | Performed by: PHYSICIAN ASSISTANT

## 2021-10-21 RX ORDER — TESTOSTERONE CYPIONATE 200 MG/ML
INJECTION INTRAMUSCULAR
COMMUNITY
Start: 2021-10-19

## 2021-10-21 NOTE — PROGRESS NOTES
1. Have you been to the ER, urgent care clinic since your last visit? Hospitalized since your last visit? No    2. Have you seen or consulted any other health care providers outside of the [address] [place] [address] since your last visit? Include any pap smears or colon screening. No    Health Maintenance Due   Topic Date Due    Eye Exam Retinal or Dilated  Never done    DTaP/Tdap/Td series (1 - Tdap) Never done    COVID-19 Vaccine (3 - Pfizer booster) 09/29/2021    Shingrix Vaccine Age 50> (2 of 2) 10/05/2021     Patient her to recheck his BP, patient had pneumonia shot this week. Patient states he was feeling funny yesterday, not sure if it is BP or from the pneumonia shot. Patient states he feels better today, just wants BP checked. Patient does work out, felt like he had a little chest pain.

## 2021-10-22 RX ORDER — TRAMADOL HYDROCHLORIDE 50 MG/1
50 TABLET ORAL
Qty: 180 TABLET | Refills: 0 | Status: SHIPPED | OUTPATIENT
Start: 2021-10-22 | End: 2022-01-18 | Stop reason: SDUPTHER

## 2021-10-22 NOTE — PROGRESS NOTES
Chief Complaint   Patient presents with    Hypertension     he is a 74y.o. year old male who presents for evaluation. The patient presents today for evaluation of his blood pressure. He reports earlier in the week he had a pneumonia vaccine. He reports after having a vaccine on Monday he felt as though maybe his heart is beating fast.  The patient states that he decided take his blood pressure and his blood pressure was elevated. He reports since then he has been feeling better but he thought he would come in to have his blood pressure checked    Reviewed and agree with Nurse Note and duplicated in this note. Reviewed PmHx, RxHx, FmHx, SocHx, AllgHx and updated and dated in the chart. Review of Systems - negative except as listed above    Objective:     Vitals:    10/21/21 1015   BP: 139/88   Pulse: 61   Resp: 20   Temp: 98.4 °F (36.9 °C)   TempSrc: Temporal   SpO2: 96%   Weight: 288 lb (130.6 kg)   Height: 6' 1\" (1.854 m)     Physical Examination: General appearance - alert, well appearing, and in no distress  Mental status - normal mood, behavior, speech, dress, motor activity, and thought processes  Chest - clear to auscultation, no wheezes, rales or rhonchi, symmetric air entry  Heart - normal rate and regular rhythm, no murmurs noted    Assessment/ Plan:   Diagnoses and all orders for this visit:    1. Essential hypertension           I have discussed the diagnosis with the patient and the intended plan as seen in the above orders. The patient has received an after-visit summary and questions were answered concerning future plans.      Medication Side Effects and Warnings were discussed with patient: yes  Patient Labs were reviewed and or requested: yes  Patient Past Records were reviewed and or requested  yes    Latisha Samuels PA-C

## 2021-10-28 DIAGNOSIS — E78.00 HYPERCHOLESTEREMIA: ICD-10-CM

## 2021-10-29 ENCOUNTER — TELEPHONE (OUTPATIENT)
Dept: INTERNAL MEDICINE CLINIC | Age: 66
End: 2021-10-29

## 2021-10-29 DIAGNOSIS — M25.569 CHRONIC KNEE PAIN, UNSPECIFIED LATERALITY: Primary | ICD-10-CM

## 2021-10-29 DIAGNOSIS — G89.29 CHRONIC KNEE PAIN, UNSPECIFIED LATERALITY: Primary | ICD-10-CM

## 2021-10-29 RX ORDER — SIMVASTATIN 20 MG/1
TABLET, FILM COATED ORAL
Qty: 90 TABLET | Refills: 1 | Status: SHIPPED | OUTPATIENT
Start: 2021-10-29

## 2021-11-04 NOTE — TELEPHONE ENCOUNTER
----- Message from Ezekiel Gonzalez sent at 11/4/2021  4:07 PM EDT -----  Subject: Message to Provider    QUESTIONS  Information for Provider? pt is requesting a prescription for lidocaine 5%   patch for his knee. pt is requesting a quantity of 30 patches to be sent   [facility], not [facility].   ---------------------------------------------------------------------------  --------------  CALL BACK INFO  What is the best way for the office to contact you? OK to leave message on   voicemail  Preferred Call Back Phone Number? [phone]  ---------------------------------------------------------------------------  --------------  SCRIPT ANSWERS  Relationship to Patient?  Self

## 2021-11-04 NOTE — TELEPHONE ENCOUNTER
pt is requesting a prescription for lidocaine 5%   patch for his knee.  pt is requesting a quantity of 30 patches to be sent   [facility]

## 2021-11-05 RX ORDER — LIDOCAINE 50 MG/G
1 PATCH TOPICAL
Qty: 30 EACH | Refills: 11 | Status: SHIPPED | OUTPATIENT
Start: 2021-11-05

## 2021-11-15 ENCOUNTER — TELEPHONE (OUTPATIENT)
Dept: INTERNAL MEDICINE CLINIC | Age: 66
End: 2021-11-15

## 2021-11-22 ENCOUNTER — DOCUMENTATION ONLY (OUTPATIENT)
Dept: INTERNAL MEDICINE CLINIC | Age: 66
End: 2021-11-22

## 2021-11-23 DIAGNOSIS — I48.0 PAROXYSMAL ATRIAL FIBRILLATION (HCC): ICD-10-CM

## 2021-11-23 RX ORDER — APIXABAN 5 MG/1
TABLET, FILM COATED ORAL
Qty: 180 TABLET | Refills: 3 | Status: SHIPPED | OUTPATIENT
Start: 2021-11-23

## 2021-12-02 ENCOUNTER — DOCUMENTATION ONLY (OUTPATIENT)
Dept: INTERNAL MEDICINE CLINIC | Age: 66
End: 2021-12-02

## 2021-12-06 ENCOUNTER — OFFICE VISIT (OUTPATIENT)
Dept: INTERNAL MEDICINE CLINIC | Age: 66
End: 2021-12-06
Payer: MEDICARE

## 2021-12-06 VITALS
BODY MASS INDEX: 37.91 KG/M2 | TEMPERATURE: 98.3 F | SYSTOLIC BLOOD PRESSURE: 132 MMHG | WEIGHT: 286 LBS | DIASTOLIC BLOOD PRESSURE: 85 MMHG | RESPIRATION RATE: 14 BRPM | OXYGEN SATURATION: 98 % | HEIGHT: 73 IN | HEART RATE: 74 BPM

## 2021-12-06 DIAGNOSIS — E66.01 SEVERE OBESITY (HCC): ICD-10-CM

## 2021-12-06 DIAGNOSIS — I10 ESSENTIAL HYPERTENSION: ICD-10-CM

## 2021-12-06 DIAGNOSIS — E78.00 HYPERCHOLESTEREMIA: ICD-10-CM

## 2021-12-06 DIAGNOSIS — I48.0 PAROXYSMAL ATRIAL FIBRILLATION (HCC): ICD-10-CM

## 2021-12-06 DIAGNOSIS — G47.33 OSA ON CPAP: ICD-10-CM

## 2021-12-06 DIAGNOSIS — N40.1 BENIGN PROSTATIC HYPERPLASIA WITH LOWER URINARY TRACT SYMPTOMS, SYMPTOM DETAILS UNSPECIFIED: ICD-10-CM

## 2021-12-06 DIAGNOSIS — E11.29 TYPE 2 DIABETES MELLITUS WITH MICROALBUMINURIA, WITHOUT LONG-TERM CURRENT USE OF INSULIN (HCC): ICD-10-CM

## 2021-12-06 DIAGNOSIS — H26.9 CATARACT OF BOTH EYES, UNSPECIFIED CATARACT TYPE: ICD-10-CM

## 2021-12-06 DIAGNOSIS — Z01.818 PREOP EXAMINATION: ICD-10-CM

## 2021-12-06 DIAGNOSIS — Z99.89 OSA ON CPAP: ICD-10-CM

## 2021-12-06 DIAGNOSIS — R80.9 TYPE 2 DIABETES MELLITUS WITH MICROALBUMINURIA, WITHOUT LONG-TERM CURRENT USE OF INSULIN (HCC): ICD-10-CM

## 2021-12-06 DIAGNOSIS — Z00.00 ANNUAL PHYSICAL EXAM: Primary | ICD-10-CM

## 2021-12-06 PROCEDURE — 2022F DILAT RTA XM EVC RTNOPTHY: CPT | Performed by: PHYSICIAN ASSISTANT

## 2021-12-06 PROCEDURE — G8427 DOCREV CUR MEDS BY ELIG CLIN: HCPCS | Performed by: PHYSICIAN ASSISTANT

## 2021-12-06 PROCEDURE — G8417 CALC BMI ABV UP PARAM F/U: HCPCS | Performed by: PHYSICIAN ASSISTANT

## 2021-12-06 PROCEDURE — 1101F PT FALLS ASSESS-DOCD LE1/YR: CPT | Performed by: PHYSICIAN ASSISTANT

## 2021-12-06 PROCEDURE — G0463 HOSPITAL OUTPT CLINIC VISIT: HCPCS | Performed by: PHYSICIAN ASSISTANT

## 2021-12-06 PROCEDURE — G8536 NO DOC ELDER MAL SCRN: HCPCS | Performed by: PHYSICIAN ASSISTANT

## 2021-12-06 PROCEDURE — G8754 DIAS BP LESS 90: HCPCS | Performed by: PHYSICIAN ASSISTANT

## 2021-12-06 PROCEDURE — 99397 PER PM REEVAL EST PAT 65+ YR: CPT | Performed by: PHYSICIAN ASSISTANT

## 2021-12-06 PROCEDURE — G8432 DEP SCR NOT DOC, RNG: HCPCS | Performed by: PHYSICIAN ASSISTANT

## 2021-12-06 PROCEDURE — 3017F COLORECTAL CA SCREEN DOC REV: CPT | Performed by: PHYSICIAN ASSISTANT

## 2021-12-06 PROCEDURE — 99214 OFFICE O/P EST MOD 30 MIN: CPT | Performed by: PHYSICIAN ASSISTANT

## 2021-12-06 PROCEDURE — G8752 SYS BP LESS 140: HCPCS | Performed by: PHYSICIAN ASSISTANT

## 2021-12-06 NOTE — PROGRESS NOTES
Meliton Enriquez is a 74 y.o. male    Visit Vitals  /85 (BP 1 Location: Left upper arm, BP Patient Position: Sitting, BP Cuff Size: Large adult)   Pulse 74   Temp 98.3 °F (36.8 °C) (Temporal)   Resp 14   Ht 6' 1\" (1.854 m)   Wt 286 lb (129.7 kg)   SpO2 98%   BMI 37.73 kg/m²      Health Maintenance Due   Topic Date Due    Eye Exam Retinal or Dilated  Never done    DTaP/Tdap/Td series (1 - Tdap) Never done    COVID-19 Vaccine (3 - Booster for Pfizer series) 09/29/2021    Shingrix Vaccine Age 50> (2 of 2) 10/05/2021     Social History     Tobacco Use    Smoking status: Never Smoker    Smokeless tobacco: Never Used   Substance Use Topics    Alcohol use: Not Currently      Family History   Problem Relation Age of Onset    Lung Cancer Mother     Hypertension Father     Hypertension Sister     Diabetes Sister     Lupus Sister     Prostate Cancer Neg Hx     Colon Cancer Neg Hx       HPI  Pt presents for an Annual Physical/PreOp for Lasik/Cataracts B. Weight is stable  Wt Readings from Last 3 Encounters:   12/06/21 286 lb (129.7 kg)   10/21/21 288 lb (130.6 kg)   10/18/21 283 lb (128.4 kg)     HTN Follow up - BP controlled:  BP Readings from Last 3 Encounters:   12/06/21 132/85   10/21/21 139/88   10/18/21 (!) 142/78     Currently taking:   Key CAD CHF Meds             Eliquis 5 mg tablet (Taking) TAKE 1 TABLET BY MOUTH TWICE A DAY    simvastatin (ZOCOR) 20 mg tablet (Taking) TAKE 1 TABLET BY MOUTH EVERY DAY    amLODIPine (NORVASC) 10 mg tablet (Taking) TAKE 1 TABLET BY MOUTH EVERY DAY    hydroCHLOROthiazide (HYDRODIURIL) 50 mg tablet (Taking) Take 1 Tablet by mouth daily. carvediloL (COREG) 25 mg tablet (Taking) TAKE 1 TABLET BY MOUTH TWICE A DAY    flecainide (TAMBOCOR) 100 mg tablet (Taking) Take 100 mg by mouth two (2) times a day. PRN a fib        Lab Results   Component Value Date/Time    Creatinine 1.19 07/21/2021 07:32 AM     DM II - diet controlled.     Lab Results   Component Value Date/Time    Hemoglobin A1c 7.0 (H) 07/21/2021 07:32 AM    Hemoglobin A1c 6.5 (H) 01/05/2021 11:23 AM    Glucose 139 (H) 07/21/2021 07:32 AM    Microalbumin/Creat ratio (mg/g creat) 161 (H) 04/21/2021 12:36 PM    Microalbumin,urine random 11.20 04/21/2021 12:36 PM    LDL, calculated 133 (H) 07/21/2021 07:32 AM    Creatinine 1.19 07/21/2021 07:32 AM     Current Outpatient Medications on File Prior to Visit   Medication Sig Dispense Refill    Eliquis 5 mg tablet TAKE 1 TABLET BY MOUTH TWICE A  Tablet 3    simvastatin (ZOCOR) 20 mg tablet TAKE 1 TABLET BY MOUTH EVERY DAY 90 Tablet 1    traMADoL (ULTRAM) 50 mg tablet Take 1 Tablet by mouth two (2) times daily as needed for Pain for up to 180 days. Max Daily Amount: 100 mg. 180 Tablet 0    testosterone cypionate (DEPOTESTOTERONE CYPIONATE) 200 mg/mL injection       amLODIPine (NORVASC) 10 mg tablet TAKE 1 TABLET BY MOUTH EVERY DAY 90 Tablet 1    diclofenac (VOLTAREN) 1 % gel Apply 4 g to affected area four (4) times daily. (Patient taking differently: Apply 4 g to affected area four (4) times daily as needed.) 450 g 5    hydroCHLOROthiazide (HYDRODIURIL) 50 mg tablet Take 1 Tablet by mouth daily. 90 Tablet 1    tamsulosin (FLOMAX) 0.4 mg capsule TAKE 1 CAPSULE BY MOUTH EVERY DAY 90 Capsule 3    carvediloL (COREG) 25 mg tablet TAKE 1 TABLET BY MOUTH TWICE A  Tablet 1    flecainide (TAMBOCOR) 100 mg tablet Take 100 mg by mouth two (2) times a day. PRN a fib      lidocaine (LIDODERM) 5 % 1 Patch by TransDERmal route daily as needed for Pain. Apply patch to the affected area for 12 hours a day and remove for 12 hours a day. 30 Each 11       Review of Systems   Constitutional: Negative for fever and weight loss. HENT: Negative for congestion, hearing loss and sore throat. Eyes: Negative for blurred vision. Respiratory: Negative for cough and shortness of breath. Cardiovascular: Negative for chest pain, palpitations and leg swelling. Gastrointestinal: Negative for abdominal pain, blood in stool, constipation, diarrhea, heartburn, melena, nausea and vomiting. Genitourinary: Positive for frequency and urgency. Negative for dysuria and hematuria. Musculoskeletal: Positive for back pain and joint pain. Negative for falls and neck pain. Neurological: Negative for dizziness, seizures, loss of consciousness, weakness and headaches. Endo/Heme/Allergies: Negative for environmental allergies. Psychiatric/Behavioral: Negative for depression and substance abuse. The patient is not nervous/anxious and does not have insomnia. Physical Exam  Vitals and nursing note reviewed. Constitutional:       General: He is not in acute distress. Appearance: He is well-developed. HENT:      Head: Normocephalic and atraumatic. Right Ear: External ear normal.      Left Ear: External ear normal.   Eyes:      Conjunctiva/sclera: Conjunctivae normal.   Neck:      Thyroid: No thyromegaly. Vascular: No JVD. Cardiovascular:      Rate and Rhythm: Normal rate and regular rhythm. Heart sounds: Normal heart sounds. Pulmonary:      Effort: Pulmonary effort is normal. No respiratory distress. Breath sounds: Normal breath sounds. Abdominal:      General: Bowel sounds are normal. There is no distension. Palpations: Abdomen is soft. There is no mass. Tenderness: There is no abdominal tenderness. There is no guarding or rebound. Musculoskeletal:      Cervical back: Neck supple. Lymphadenopathy:      Cervical: No cervical adenopathy. Skin:     General: Skin is warm and dry. Neurological:      Mental Status: He is alert and oriented to person, place, and time. Psychiatric:         Behavior: Behavior normal.         Thought Content: Thought content normal.         Judgment: Judgment normal.         Diagnoses and all orders for this visit:    1. Annual physical exam    2.  Preop examination for Cataracts  Cleared for Surgery     3. Paroxysmal atrial fibrillation ([facility] [address]  Ok to hold Eliquis for 24 hours prior to surgery     4. Severe obesity ([facility] [address]    5. Type 2 diabetes mellitus with microalbuminuria, without long-term current use of insulin (HCC)  -     LIPID PANEL; Future  -     HEMOGLOBIN A1C WITH EAG; Future  -     METABOLIC PANEL, COMPREHENSIVE; Future    6. Essential hypertension  Controlled    7. Benign prostatic hyperplasia with lower urinary tract symptoms, symptom details unspecified  Followed by Urology    8. YNES on CPAP  Continue CPAP    9.  Hypercholesteremia  Labs today

## 2022-01-02 DIAGNOSIS — I10 ESSENTIAL HYPERTENSION: ICD-10-CM

## 2022-01-03 RX ORDER — CARVEDILOL 25 MG/1
TABLET ORAL
Qty: 180 TABLET | Refills: 1 | Status: SHIPPED | OUTPATIENT
Start: 2022-01-03 | End: 2022-07-06

## 2022-01-09 DIAGNOSIS — I10 ESSENTIAL HYPERTENSION: ICD-10-CM

## 2022-01-10 RX ORDER — HYDROCHLOROTHIAZIDE 50 MG/1
TABLET ORAL
Qty: 90 TABLET | Refills: 1 | Status: SHIPPED | OUTPATIENT
Start: 2022-01-10 | End: 2022-07-06

## 2022-01-18 DIAGNOSIS — M25.569 CHRONIC KNEE PAIN, UNSPECIFIED LATERALITY: ICD-10-CM

## 2022-01-18 DIAGNOSIS — G89.29 CHRONIC KNEE PAIN, UNSPECIFIED LATERALITY: ICD-10-CM

## 2022-01-18 DIAGNOSIS — G89.29 CHRONIC BILATERAL LOW BACK PAIN, UNSPECIFIED WHETHER SCIATICA PRESENT: ICD-10-CM

## 2022-01-18 DIAGNOSIS — M54.50 CHRONIC BILATERAL LOW BACK PAIN, UNSPECIFIED WHETHER SCIATICA PRESENT: ICD-10-CM

## 2022-01-18 RX ORDER — TRAMADOL HYDROCHLORIDE 50 MG/1
50 TABLET ORAL
Qty: 180 TABLET | Refills: 0 | Status: SHIPPED | OUTPATIENT
Start: 2022-01-18 | End: 2022-07-17

## 2022-02-11 ENCOUNTER — OFFICE VISIT (OUTPATIENT)
Dept: INTERNAL MEDICINE CLINIC | Age: 67
End: 2022-02-11
Payer: MEDICARE

## 2022-02-11 VITALS
DIASTOLIC BLOOD PRESSURE: 70 MMHG | WEIGHT: 284 LBS | OXYGEN SATURATION: 98 % | SYSTOLIC BLOOD PRESSURE: 134 MMHG | TEMPERATURE: 98.2 F | HEIGHT: 73 IN | RESPIRATION RATE: 16 BRPM | HEART RATE: 62 BPM | BODY MASS INDEX: 37.64 KG/M2

## 2022-02-11 DIAGNOSIS — R80.9 TYPE 2 DIABETES MELLITUS WITH MICROALBUMINURIA, WITHOUT LONG-TERM CURRENT USE OF INSULIN (HCC): Primary | ICD-10-CM

## 2022-02-11 DIAGNOSIS — E11.29 TYPE 2 DIABETES MELLITUS WITH MICROALBUMINURIA, WITHOUT LONG-TERM CURRENT USE OF INSULIN (HCC): Primary | ICD-10-CM

## 2022-02-11 DIAGNOSIS — E66.01 SEVERE OBESITY (BMI 35.0-39.9) WITH COMORBIDITY (HCC): ICD-10-CM

## 2022-02-11 DIAGNOSIS — M54.50 CHRONIC BILATERAL LOW BACK PAIN, UNSPECIFIED WHETHER SCIATICA PRESENT: ICD-10-CM

## 2022-02-11 DIAGNOSIS — I10 ESSENTIAL HYPERTENSION: ICD-10-CM

## 2022-02-11 DIAGNOSIS — G89.29 CHRONIC BILATERAL LOW BACK PAIN, UNSPECIFIED WHETHER SCIATICA PRESENT: ICD-10-CM

## 2022-02-11 DIAGNOSIS — I48.0 PAROXYSMAL ATRIAL FIBRILLATION (HCC): ICD-10-CM

## 2022-02-11 PROCEDURE — G8427 DOCREV CUR MEDS BY ELIG CLIN: HCPCS | Performed by: PHYSICIAN ASSISTANT

## 2022-02-11 PROCEDURE — 3046F HEMOGLOBIN A1C LEVEL >9.0%: CPT | Performed by: PHYSICIAN ASSISTANT

## 2022-02-11 PROCEDURE — G0463 HOSPITAL OUTPT CLINIC VISIT: HCPCS | Performed by: PHYSICIAN ASSISTANT

## 2022-02-11 PROCEDURE — G8754 DIAS BP LESS 90: HCPCS | Performed by: PHYSICIAN ASSISTANT

## 2022-02-11 PROCEDURE — 3017F COLORECTAL CA SCREEN DOC REV: CPT | Performed by: PHYSICIAN ASSISTANT

## 2022-02-11 PROCEDURE — G8752 SYS BP LESS 140: HCPCS | Performed by: PHYSICIAN ASSISTANT

## 2022-02-11 PROCEDURE — 1101F PT FALLS ASSESS-DOCD LE1/YR: CPT | Performed by: PHYSICIAN ASSISTANT

## 2022-02-11 PROCEDURE — 99214 OFFICE O/P EST MOD 30 MIN: CPT | Performed by: PHYSICIAN ASSISTANT

## 2022-02-11 PROCEDURE — G8432 DEP SCR NOT DOC, RNG: HCPCS | Performed by: PHYSICIAN ASSISTANT

## 2022-02-11 PROCEDURE — 2022F DILAT RTA XM EVC RTNOPTHY: CPT | Performed by: PHYSICIAN ASSISTANT

## 2022-02-11 PROCEDURE — G8536 NO DOC ELDER MAL SCRN: HCPCS | Performed by: PHYSICIAN ASSISTANT

## 2022-02-11 PROCEDURE — G8417 CALC BMI ABV UP PARAM F/U: HCPCS | Performed by: PHYSICIAN ASSISTANT

## 2022-02-11 RX ORDER — CYCLOBENZAPRINE HCL 10 MG
10 TABLET ORAL
Qty: 90 TABLET | Refills: 1 | Status: SHIPPED | OUTPATIENT
Start: 2022-02-11

## 2022-02-11 RX ORDER — METHYLPREDNISOLONE 4 MG/1
TABLET ORAL
Qty: 1 DOSE PACK | Refills: 0 | Status: SHIPPED | OUTPATIENT
Start: 2022-02-11 | End: 2022-05-17
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
## 2022-02-11 NOTE — PROGRESS NOTES
1. Have you been to the ER, urgent care clinic since your last visit? Hospitalized since your last visit? No    2. Have you seen or consulted any other health care providers outside of the [address] [place] [address] since your last visit? Include any pap smears or colon screening.  Yes      Chief Complaint   Patient presents with    Hypertension     follow up    Diabetes     follow up

## 2022-03-07 ENCOUNTER — HOSPITAL ENCOUNTER (OUTPATIENT)
Dept: PHYSICAL THERAPY | Age: 67
Discharge: HOME | End: 2022-03-07
Payer: MEDICARE

## 2022-03-07 PROCEDURE — 97161 PT EVAL LOW COMPLEX 20 MIN: CPT | Performed by: PHYSICAL THERAPIST

## 2022-03-07 PROCEDURE — 97110 THERAPEUTIC EXERCISES: CPT | Performed by: PHYSICAL THERAPIST
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
## 2022-03-07 NOTE — PROGRESS NOTES
[facility]  [facility], [address] [place] [address]  Phone: [phone]  Fax: [phone]    Plan of Care/Statement of Necessity for Physical Therapy Services  2-15    Patient name: Deven Morrissey  : 1955  Provider#: [id number]  Referral source: Azul Whitehead MD      Medical/Treatment Diagnosis: Back pain [M54.9]     Prior Hospitalization: see medical history     Comorbidities: Lumbar surgery 2017, R TKA 2014, HTN   Prior Level of Function: Back pain intermittent over the past 10 years, more consistent pain over the past 4-5 years  Medications: Verified on Patient Summary List    Start of Care: 3/7/22      Onset Date: 10 years ago       The Plan of Care and following information is based on the information from the initial evaluation. Assessment/ key information: Patient presents with low back pain with limited lumbar ROM and core strength limiting ability to perform functional activities such as sitting, swimming and walking. He would benefit from skilled PT to increase lumbar ROM and core strength to decrease pain so he can return to prior level of function.      Evaluation Complexity History MEDIUM  Complexity : 1-2 comorbidities / personal factors will impact the outcome/ POC ; Examination LOW Complexity : 1-2 Standardized tests and measures addressing body structure, function, activity limitation and / or participation in recreation  ;Presentation LOW Complexity : Stable, uncomplicated  ;Clinical Decision Making LOW Complexity : FOTO score of   Overall Complexity Rating: LOW     Problem List: pain affecting function, decrease ROM, decrease strength, decrease ADL/ functional abilitiies and decrease activity tolerance   Treatment Plan may include any combination of the following: Therapeutic exercise, Therapeutic activities, Neuromuscular re-education, Physical agent/modality and Manual therapy  Patient / Family readiness to learn indicated by: asking questions, trying to perform skills and interest  Persons(s) to be included in education: patient (P)  Barriers to Learning/Limitations: None  Patient Goal (s): Just relieve some of this pain. Learning how to deal with that by exercise  Patient Self Reported Health Status: good  Rehabilitation Potential: good    Short Term Goals: To be accomplished in 2 weeks:    1. Patient will be I in HEP to promote self management of symptoms. 2. Patient will report pain level at worst as less than or equal to 8/10 so they can perform ADLs without pain. Long Term Goals: To be accomplished in 4-6 weeks:    1. Patient will report pain level at worst as less than or equal to 6/10 so they can perform ADLs without pain. 2. Patient will be able to swim > or = 30 laps without limitation from back pain. 3. Patient will be able to ambulate > or = 1/2 mile without limitation from back pain. Frequency / Duration: Patient to be seen 2 times per week for 4-8 weeks. Patient/ Caregiver education and instruction: exercises    [x]  Plan of care has been reviewed with JEWELL Jennings, PT 3/7/2022 12:37 PM    ________________________________________________________________________    I certify that the above Therapy Services are being furnished while the patient is under my care. I agree with the treatment plan and certify that this therapy is necessary.     [facility] Signature:____________________  Date:____________Time: _________

## 2022-03-09 ENCOUNTER — HOSPITAL ENCOUNTER (OUTPATIENT)
Dept: PHYSICAL THERAPY | Age: 67
Discharge: HOME | End: 2022-03-09
Payer: MEDICARE

## 2022-03-09 PROCEDURE — 97110 THERAPEUTIC EXERCISES: CPT | Performed by: PHYSICAL THERAPIST

## 2022-03-09 NOTE — PROGRESS NOTES
PT DAILY TREATMENT NOTE - [facility] 2-15    Patient Name: Deven Morrissey  Date:3/9/2022  : 1955  [x]  Patient  Verified  Payor: Nataly Polanco / Plan: [place] MEDICARE PART A & B / Product Type: Medicare /    In time:1100 AM  Out time:1135 AM  Total Treatment Time (min): 35  Total Timed Codes (min): 35  1:1 Treatment Time ( only): 25   Visit #:  2    Treatment Area: Back pain [M54.9]    SUBJECTIVE  Pain Level (0-10 scale): 0  Any medication changes, allergies to medications, adverse drug reactions, diagnosis change, or new procedure performed?: [x] No    [] Yes (see summary sheet for update)  Subjective functional status/changes:   [] No changes reported  Patient reports that he has been working on his HEP. He went to the gym this morning. He is going to try swimming tomorrow. OBJECTIVE      35 min Therapeutic Exercise:  [x] See flow sheet : Progressed per flow sheet    Rationale: increase ROM and increase strength to improve the patients ability to perform ADLs               With   [x] TE   [] TA   [] neuro   [] other: Patient Education: [x] Review HEP    [] Progressed/Changed HEP based on:   [] positioning   [] body mechanics   [] transfers   [] heat/ice application    [] other:      Other Objective/Functional Measures: NT     Pain Level (0-10 scale) post treatment: 0     ASSESSMENT/Changes in Function:   Patient tolerated exercise progression well today. Patient will continue to benefit from skilled PT services to modify and progress therapeutic interventions, address functional mobility deficits, address ROM deficits, address strength deficits, analyze and address soft tissue restrictions, analyze and cue movement patterns, analyze and modify body mechanics/ergonomics and assess and modify postural abnormalities to attain remaining goals. Progress towards goals / Updated goals:  Short Term Goals:  To be accomplished in 2 weeks:               1. Patient will be I in HEP to promote self management of symptoms. PROGRESSING              2. Patient will report pain level at worst as less than or equal to 8/10 so they can perform ADLs without pain. PROGRESSING   Long Term Goals: To be accomplished in 4-6 weeks:               1.  Patient will report pain level at worst as less than or equal to 6/10 so they can perform ADLs without pain. 2. Patient will be able to swim > or = 30 laps without limitation from back pain.                3. Patient will be able to ambulate > or = 1/2 mile without limitation from back pain.        PLAN  [x]  Upgrade activities as tolerated     [x]  Continue plan of care  []  Update interventions per flow sheet       []  Discharge due to:_  []  Other:_      Onelia Haji, PT 3/9/2022

## 2022-03-14 ENCOUNTER — HOSPITAL ENCOUNTER (OUTPATIENT)
Dept: PHYSICAL THERAPY | Age: 67
Discharge: HOME | End: 2022-03-14
Payer: MEDICARE

## 2022-03-14 PROCEDURE — 97110 THERAPEUTIC EXERCISES: CPT | Performed by: PHYSICAL THERAPIST

## 2022-03-14 NOTE — PROGRESS NOTES
PT DAILY TREATMENT NOTE - [facility] 2-15    Patient Name: Michael Second  Date:3/14/2022  : 1955  [x]  Patient  Verified  Payor: Ab Romo / Plan: [place] MEDICARE PART A & B / Product Type: Medicare /    In time: 910 AM  Out time: 1010AM  Total Treatment Time (min): 60  Total Timed Codes (min): 60  1:1 Treatment Time ( only): 25   Visit #:  3    Treatment Area: Back pain [M54.9]    SUBJECTIVE  Pain Level (0-10 scale): 3/10  Any medication changes, allergies to medications, adverse drug reactions, diagnosis change, or new procedure performed?: [x] No    [] Yes (see summary sheet for update)  Subjective functional status/changes:   [] No changes reported  Patient reports that his pain that he came in here for is much better, but he still has low back pain in area where he will be getting an ablasion next month. OBJECTIVE      60 min Therapeutic Exercise:  [x] See flow sheet : Progressed per flow sheet    Rationale: increase ROM and increase strength to improve the patients ability to perform ADLs               With   [x] TE   [] TA   [] neuro   [] other: Patient Education: [x] Review HEP    [] Progressed/Changed HEP based on:   [] positioning   [] body mechanics   [] transfers   [] heat/ice application    [] other:      Other Objective/Functional Measures: NT     Pain Level (0-10 scale) post treatment: 0     ASSESSMENT/Changes in Function:   Patient tolerated exercise progression well today without reports of increased pain. Patient will continue to benefit from skilled PT services to modify and progress therapeutic interventions, address functional mobility deficits, address ROM deficits, address strength deficits, analyze and address soft tissue restrictions, analyze and cue movement patterns, analyze and modify body mechanics/ergonomics and assess and modify postural abnormalities to attain remaining goals. Progress towards goals / Updated goals:  Short Term Goals:  To be accomplished in 2 weeks: 1. Patient will be I in HEP to promote self management of symptoms. PROGRESSING              2. Patient will report pain level at worst as less than or equal to 8/10 so they can perform ADLs without pain. PROGRESSING   Long Term Goals: To be accomplished in 4-6 weeks:               1.  Patient will report pain level at worst as less than or equal to 6/10 so they can perform ADLs without pain. 2. Patient will be able to swim > or = 30 laps without limitation from back pain.                3. Patient will be able to ambulate > or = 1/2 mile without limitation from back pain.        PLAN  [x]  Upgrade activities as tolerated     [x]  Continue plan of care  []  Update interventions per flow sheet       []  Discharge due to:_  []  Other:_      Nannette Meeks, PT 3/14/2022

## 2022-03-16 ENCOUNTER — APPOINTMENT (OUTPATIENT)
Dept: PHYSICAL THERAPY | Age: 67
End: 2022-03-16
Payer: MEDICARE

## 2022-03-17 ENCOUNTER — HOSPITAL ENCOUNTER (OUTPATIENT)
Dept: PHYSICAL THERAPY | Age: 67
Discharge: HOME | End: 2022-03-17
Payer: MEDICARE

## 2022-03-17 PROCEDURE — 97110 THERAPEUTIC EXERCISES: CPT | Performed by: PHYSICAL THERAPIST

## 2022-03-17 NOTE — PROGRESS NOTES
PT DAILY TREATMENT NOTE - [facility] 2-15    Patient Name: Christiano Collazo  Date:3/17/2022  : 1955  [x]  Patient  Verified  Payor: Sascha Ray / Plan: [place] MEDICARE PART A & B / Product Type: Medicare /    In time: 125 PM  Out time: 205 PM  Total Treatment Time (min): 40  Total Timed Codes (min): 40  1:1 Treatment Time ( only): 30   Visit #:  4    Treatment Area: Back pain [M54.9]    SUBJECTIVE  Pain Level (0-10 scale): 0/10  Any medication changes, allergies to medications, adverse drug reactions, diagnosis change, or new procedure performed?: [x] No    [] Yes (see summary sheet for update)  Subjective functional status/changes:   [] No changes reported  Patient reports that he just finished swimming laps at the pool and did not have any increased pain. OBJECTIVE      40 min Therapeutic Exercise:  [x] See flow sheet : Progressed per flow sheet    Rationale: increase ROM and increase strength to improve the patients ability to perform ADLs               With   [x] TE   [] TA   [] neuro   [] other: Patient Education: [x] Review HEP    [] Progressed/Changed HEP based on:   [] positioning   [] body mechanics   [] transfers   [] heat/ice application    [] other:      Other Objective/Functional Measures: NT     Pain Level (0-10 scale) post treatment: 0     ASSESSMENT/Changes in Function:   Pt participated well with skilled PT services without complaints of increased pain. Required cueing for proper form with clamshell performance. No warmup performed due to pt just coming from pool   Patient will continue to benefit from skilled PT services to modify and progress therapeutic interventions, address functional mobility deficits, address ROM deficits, address strength deficits, analyze and address soft tissue restrictions, analyze and cue movement patterns, analyze and modify body mechanics/ergonomics and assess and modify postural abnormalities to attain remaining goals.             Progress towards goals / Updated goals:  Short Term Goals: To be accomplished in 2 weeks:               1. Patient will be I in HEP to promote self management of symptoms. PROGRESSING              2. Patient will report pain level at worst as less than or equal to 8/10 so they can perform ADLs without pain. PROGRESSING   Long Term Goals: To be accomplished in 4-6 weeks:               1.  Patient will report pain level at worst as less than or equal to 6/10 so they can perform ADLs without pain. 2. Patient will be able to swim > or = 30 laps without limitation from back pain.                3. Patient will be able to ambulate > or = 1/2 mile without limitation from back pain.        PLAN  [x]  Upgrade activities as tolerated     [x]  Continue plan of care  []  Update interventions per flow sheet       []  Discharge due to:_  []  Other:_      Darnell Kennedy, PT 3/17/2022

## 2022-03-19 PROBLEM — I48.0 PAROXYSMAL ATRIAL FIBRILLATION (HCC): Status: ACTIVE | Noted: 2020-10-02

## 2022-03-19 PROBLEM — G89.29 CHRONIC BILATERAL LOW BACK PAIN, UNSPECIFIED WHETHER SCIATICA PRESENT: Status: ACTIVE | Noted: 2020-10-02

## 2022-03-19 PROBLEM — E11.29 TYPE 2 DIABETES MELLITUS WITH MICROALBUMINURIA, WITHOUT LONG-TERM CURRENT USE OF INSULIN (HCC): Status: ACTIVE | Noted: 2021-01-08

## 2022-03-19 PROBLEM — Z79.891 CHRONIC PRESCRIPTION OPIATE USE: Status: ACTIVE | Noted: 2020-10-02

## 2022-03-19 PROBLEM — Z99.89 OSA ON CPAP: Status: ACTIVE | Noted: 2021-12-06

## 2022-03-19 PROBLEM — N40.1 BENIGN PROSTATIC HYPERPLASIA WITH LOWER URINARY TRACT SYMPTOMS, SYMPTOM DETAILS UNSPECIFIED: Status: ACTIVE | Noted: 2020-10-02

## 2022-03-19 PROBLEM — R80.9 TYPE 2 DIABETES MELLITUS WITH MICROALBUMINURIA, WITHOUT LONG-TERM CURRENT USE OF INSULIN (HCC): Status: ACTIVE | Noted: 2021-01-08

## 2022-03-19 PROBLEM — M25.569 CHRONIC KNEE PAIN, UNSPECIFIED LATERALITY: Status: ACTIVE | Noted: 2020-10-02

## 2022-03-19 PROBLEM — G89.29 CHRONIC KNEE PAIN, UNSPECIFIED LATERALITY: Status: ACTIVE | Noted: 2020-10-02

## 2022-03-19 PROBLEM — E78.00 HYPERCHOLESTEREMIA: Status: ACTIVE | Noted: 2021-12-06

## 2022-03-19 PROBLEM — G47.33 OSA ON CPAP: Status: ACTIVE | Noted: 2021-12-06

## 2022-03-19 PROBLEM — M54.50 CHRONIC BILATERAL LOW BACK PAIN, UNSPECIFIED WHETHER SCIATICA PRESENT: Status: ACTIVE | Noted: 2020-10-02

## 2022-03-20 PROBLEM — E66.01 SEVERE OBESITY (HCC): Status: ACTIVE | Noted: 2020-10-21

## 2022-03-20 PROBLEM — I10 ESSENTIAL HYPERTENSION: Status: ACTIVE | Noted: 2020-10-02

## 2022-03-25 ENCOUNTER — APPOINTMENT (OUTPATIENT)
Dept: PHYSICAL THERAPY | Age: 67
End: 2022-03-25
Payer: MEDICARE

## 2022-03-28 ENCOUNTER — HOSPITAL ENCOUNTER (OUTPATIENT)
Dept: PHYSICAL THERAPY | Age: 67
Discharge: HOME | End: 2022-03-28
Payer: MEDICARE

## 2022-03-28 PROCEDURE — 97110 THERAPEUTIC EXERCISES: CPT | Performed by: PHYSICAL THERAPIST

## 2022-03-28 NOTE — PROGRESS NOTES
PT DAILY TREATMENT NOTE - [facility] -15    Patient Name: Anaya Raman  Date:3/28/2022  : 1955  [x]  Patient  Verified  Payor: Liudmila Garcia / Plan: [place] MEDICARE PART A & B / Product Type: Medicare /    In time: 1000 AM  Out time: 1050 AM  Total Treatment Time (min): 50  Total Timed Codes (min): 40  1:1 Treatment Time ( only): 25  Visit #:  5    Treatment Area: Back pain [M54.9]    SUBJECTIVE  Pain Level (0-10 scale): 0/10  Any medication changes, allergies to medications, adverse drug reactions, diagnosis change, or new procedure performed?: [x] No    [] Yes (see summary sheet for update)  Subjective functional status/changes:   [] No changes reported  Patient reports that his low back pain is better, still has pain on the R side. He has been able to get back to swimming without increased pain. He is scheduled for a nerve ablation the second week of April. OBJECTIVE    Modality: 10 min moist heat to low back supine post treatment for pain relief     40 min Therapeutic Exercise:  [x] See flow sheet : Progressed per flow sheet    Rationale: increase ROM and increase strength to improve the patients ability to perform ADLs               With   [x] TE   [] TA   [] neuro   [] other: Patient Education: [x] Review HEP    [] Progressed/Changed HEP based on:   [] positioning   [] body mechanics   [] transfers   [] heat/ice application    [] other:      Other Objective/Functional Measures: NT     Pain Level (0-10 scale) post treatment: 3-4    ASSESSMENT/Changes in Function:   Patient tolerated exercises well today. Progressing with self management of symptoms, able to return to swimming without limitation from pain.    Patient will continue to benefit from skilled PT services to modify and progress therapeutic interventions, address functional mobility deficits, address ROM deficits, address strength deficits, analyze and address soft tissue restrictions, analyze and cue movement patterns, analyze and modify body mechanics/ergonomics and assess and modify postural abnormalities to attain remaining goals. Progress towards goals / Updated goals:  Short Term Goals: To be accomplished in 2 weeks:               1. Patient will be I in HEP to promote self management of symptoms. PROGRESSING              2. Patient will report pain level at worst as less than or equal to 8/10 so they can perform ADLs without pain. PROGRESSING   Long Term Goals: To be accomplished in 4-6 weeks:               1.  Patient will report pain level at worst as less than or equal to 6/10 so they can perform ADLs without pain. 2. Patient will be able to swim > or = 30 laps without limitation from back pain.                3. Patient will be able to ambulate > or = 1/2 mile without limitation from back pain.        PLAN  [x]  Upgrade activities as tolerated     [x]  Continue plan of care  []  Update interventions per flow sheet       []  Discharge due to:_  []  Other:_      Dilcia Baez, PT 3/28/2022

## 2022-03-31 ENCOUNTER — APPOINTMENT (OUTPATIENT)
Dept: PHYSICAL THERAPY | Age: 67
End: 2022-03-31
Payer: MEDICARE

## 2022-05-06 DIAGNOSIS — M25.569 CHRONIC KNEE PAIN, UNSPECIFIED LATERALITY: ICD-10-CM

## 2022-05-06 DIAGNOSIS — M54.50 CHRONIC BILATERAL LOW BACK PAIN, UNSPECIFIED WHETHER SCIATICA PRESENT: ICD-10-CM

## 2022-05-06 DIAGNOSIS — G89.29 CHRONIC BILATERAL LOW BACK PAIN, UNSPECIFIED WHETHER SCIATICA PRESENT: ICD-10-CM

## 2022-05-06 DIAGNOSIS — G89.29 CHRONIC KNEE PAIN, UNSPECIFIED LATERALITY: ICD-10-CM

## 2022-05-06 RX ORDER — TRAMADOL HYDROCHLORIDE 50 MG/1
50 TABLET ORAL
Qty: 180 TABLET | Refills: 0 | OUTPATIENT
Start: 2022-05-06 | End: 2022-11-02

## 2022-05-10 ENCOUNTER — OFFICE VISIT (OUTPATIENT)
Dept: INTERNAL MEDICINE CLINIC | Age: 67
End: 2022-05-10

## 2022-05-10 VITALS — TEMPERATURE: 98.2 F

## 2022-05-10 DIAGNOSIS — I10 ESSENTIAL HYPERTENSION: ICD-10-CM

## 2022-05-10 DIAGNOSIS — Z79.891 CHRONIC PRESCRIPTION OPIATE USE: ICD-10-CM

## 2022-05-10 DIAGNOSIS — I48.0 PAROXYSMAL ATRIAL FIBRILLATION (HCC): ICD-10-CM

## 2022-05-10 DIAGNOSIS — M25.569 CHRONIC KNEE PAIN, UNSPECIFIED LATERALITY: ICD-10-CM

## 2022-05-10 DIAGNOSIS — R80.9 TYPE 2 DIABETES MELLITUS WITH MICROALBUMINURIA, WITHOUT LONG-TERM CURRENT USE OF INSULIN (HCC): ICD-10-CM

## 2022-05-10 DIAGNOSIS — G89.29 CHRONIC BILATERAL LOW BACK PAIN, UNSPECIFIED WHETHER SCIATICA PRESENT: Primary | ICD-10-CM

## 2022-05-10 DIAGNOSIS — E66.01 SEVERE OBESITY (HCC): ICD-10-CM

## 2022-05-10 DIAGNOSIS — G89.29 CHRONIC KNEE PAIN, UNSPECIFIED LATERALITY: ICD-10-CM

## 2022-05-10 DIAGNOSIS — E11.29 TYPE 2 DIABETES MELLITUS WITH MICROALBUMINURIA, WITHOUT LONG-TERM CURRENT USE OF INSULIN (HCC): ICD-10-CM

## 2022-05-10 DIAGNOSIS — M54.50 CHRONIC BILATERAL LOW BACK PAIN, UNSPECIFIED WHETHER SCIATICA PRESENT: Primary | ICD-10-CM

## 2022-05-10 NOTE — PROGRESS NOTES
Parker Luciano is a 69 y.o. male  Chief Complaint   Patient presents with    Medication Refill     Visit Vitals  Temp 98.2 °F (36.8 °C) (Temporal)      HPI  Chronic Tramadol use with recent abnormal routine  check. Pt last filled an Rx from me 1/18/22 for #180 Tramadol.      Unfortunately, pt had 28 Tramadol filled on 4/13/22 by Grazyna Moreno, 10 Tramadol filled on 3/16/22 by Charli Gutierrez.      Last dose of Tramadol: yesterday. Pt is very argumentative today. He does not want to have his vitals done/ do his HTN/Diabetes follow up/Sign Pain Contract. He would like to just do a urine drug screen today to show his Pain Management provider, and he will ask Pain Management to take over his Tramadol Rx. ROS  Review of Systems   Constitutional: Negative for fever and malaise/fatigue. HENT: Negative for congestion. Respiratory: Negative for cough and shortness of breath. Musculoskeletal: Positive for back pain and joint pain. Psychiatric/Behavioral: Negative for substance abuse. EXAM  Physical Exam  Vitals and nursing note reviewed. Constitutional:       General: He is not in acute distress. Appearance: He is well-developed. HENT:      Head: Normocephalic and atraumatic. Neck:      Vascular: No JVD. Pulmonary:      Effort: Pulmonary effort is normal. No respiratory distress. Musculoskeletal:      Cervical back: Neck supple. Skin:     General: Skin is warm and dry. Neurological:      Mental Status: He is alert and oriented to person, place, and time. Gait: Gait normal.   Psychiatric:      Comments: Irritable, argumentative. ASSESSMENT/PLAN  Encounter Diagnoses     ICD-10-CM ICD-9-CM   1. Chronic bilateral low back pain, unspecified whether sciatica present  M54.50 724.2    G89.29 338.29   2. Chronic knee pain, unspecified laterality  M25.569 719.46    G89.29 338.29   3. Chronic prescription opiate use  Z79.891 V58.69     Discussed pain management with pt.  He declines Pain Contract/Exam. He is aware that I will no longer write his Tramadol.

## 2022-05-17 ENCOUNTER — TELEPHONE (OUTPATIENT)
Dept: INTERNAL MEDICINE CLINIC | Age: 67
End: 2022-05-17

## 2022-05-17 DIAGNOSIS — E87.6 HYPOKALEMIA: Primary | ICD-10-CM

## 2022-05-17 RX ORDER — POTASSIUM CHLORIDE 750 MG/1
10 TABLET, EXTENDED RELEASE ORAL DAILY
Qty: 90 TABLET | Refills: 3 | Status: SHIPPED | OUTPATIENT
Start: 2022-05-17

## 2022-05-17 NOTE — TELEPHONE ENCOUNTER
Potassium is slightly low. I'm sending a potassium supplement now. Please inform him that he needs to take this once daily.

## 2022-05-18 ENCOUNTER — TELEPHONE (OUTPATIENT)
Dept: INTERNAL MEDICINE CLINIC | Age: 67
End: 2022-05-18

## 2022-06-09 DIAGNOSIS — I10 ESSENTIAL HYPERTENSION: ICD-10-CM

## 2022-06-09 DIAGNOSIS — N40.1 BENIGN PROSTATIC HYPERPLASIA WITH LOWER URINARY TRACT SYMPTOMS, SYMPTOM DETAILS UNSPECIFIED: ICD-10-CM

## 2022-06-10 RX ORDER — TAMSULOSIN HYDROCHLORIDE 0.4 MG/1
CAPSULE ORAL
Qty: 90 CAPSULE | Refills: 3 | Status: SHIPPED | OUTPATIENT
Start: 2022-06-10

## 2022-06-10 RX ORDER — AMLODIPINE BESYLATE 10 MG/1
TABLET ORAL
Qty: 90 TABLET | Refills: 1 | Status: SHIPPED | OUTPATIENT
Start: 2022-06-10

## 2022-06-14 ENCOUNTER — OFFICE VISIT (OUTPATIENT)
Dept: INTERNAL MEDICINE CLINIC | Age: 67
End: 2022-06-14
Payer: MEDICARE

## 2022-06-14 DIAGNOSIS — I48.0 PAROXYSMAL ATRIAL FIBRILLATION (HCC): ICD-10-CM

## 2022-06-14 DIAGNOSIS — E78.00 HYPERCHOLESTEREMIA: ICD-10-CM

## 2022-06-14 DIAGNOSIS — E87.6 HYPOKALEMIA: ICD-10-CM

## 2022-06-14 DIAGNOSIS — E87.6 HYPOKALEMIA: Primary | ICD-10-CM

## 2022-06-14 DIAGNOSIS — E66.01 SEVERE OBESITY (HCC): ICD-10-CM

## 2022-06-14 DIAGNOSIS — R80.9 TYPE 2 DIABETES MELLITUS WITH MICROALBUMINURIA, WITHOUT LONG-TERM CURRENT USE OF INSULIN (HCC): ICD-10-CM

## 2022-06-14 DIAGNOSIS — I10 ESSENTIAL HYPERTENSION: ICD-10-CM

## 2022-06-14 DIAGNOSIS — E11.29 TYPE 2 DIABETES MELLITUS WITH MICROALBUMINURIA, WITHOUT LONG-TERM CURRENT USE OF INSULIN (HCC): ICD-10-CM

## 2022-06-14 PROCEDURE — G8427 DOCREV CUR MEDS BY ELIG CLIN: HCPCS | Performed by: PHYSICIAN ASSISTANT

## 2022-06-14 PROCEDURE — G8417 CALC BMI ABV UP PARAM F/U: HCPCS | Performed by: PHYSICIAN ASSISTANT

## 2022-06-14 PROCEDURE — G8432 DEP SCR NOT DOC, RNG: HCPCS | Performed by: PHYSICIAN ASSISTANT

## 2022-06-14 PROCEDURE — 99214 OFFICE O/P EST MOD 30 MIN: CPT | Performed by: PHYSICIAN ASSISTANT

## 2022-06-14 PROCEDURE — G8536 NO DOC ELDER MAL SCRN: HCPCS | Performed by: PHYSICIAN ASSISTANT

## 2022-06-14 PROCEDURE — G0463 HOSPITAL OUTPT CLINIC VISIT: HCPCS | Performed by: PHYSICIAN ASSISTANT

## 2022-06-14 PROCEDURE — 3017F COLORECTAL CA SCREEN DOC REV: CPT | Performed by: PHYSICIAN ASSISTANT

## 2022-06-14 PROCEDURE — 2022F DILAT RTA XM EVC RTNOPTHY: CPT | Performed by: PHYSICIAN ASSISTANT

## 2022-06-14 PROCEDURE — G8752 SYS BP LESS 140: HCPCS | Performed by: PHYSICIAN ASSISTANT

## 2022-06-14 PROCEDURE — G8754 DIAS BP LESS 90: HCPCS | Performed by: PHYSICIAN ASSISTANT

## 2022-06-14 PROCEDURE — 1101F PT FALLS ASSESS-DOCD LE1/YR: CPT | Performed by: PHYSICIAN ASSISTANT

## 2022-06-14 NOTE — PROGRESS NOTES
Freida Nagel is a 69 y.o. male  Chief Complaint   Patient presents with    Hypertension     follow up    Diabetes     follow up     Visit Vitals  /80   Pulse 66   Temp 98.4 °F (36.9 °C) (Temporal)   Resp 16   Ht 6' 1\" (1.854 m)   Wt 291 lb (132 kg)   SpO2 94%   BMI 38.39 kg/m²      Health Maintenance Due   Topic Date Due    Eye Exam Retinal or Dilated  Never done    DTaP/Tdap/Td series (1 - Tdap) Never done    COVID-19 Vaccine (3 - Booster for Pfizer series) 08/29/2021    Shingrix Vaccine Age 50> (2 of 2) 10/05/2021    Foot Exam Q1  01/08/2022     HPI  DM II - less well controlled at last check, but had been on steroids prior to this check. Lab Results   Component Value Date/Time    Hemoglobin A1c 7.5 (H) 05/12/2022 07:41 AM    Hemoglobin A1c 6.4 (H) 12/15/2021 07:56 AM    Hemoglobin A1c 7.0 (H) 07/21/2021 07:32 AM    Glucose 142 (H) 05/12/2022 07:41 AM    Microalbumin/Creat ratio (mg/g creat) 123 (H) 05/12/2022 07:41 AM    Microalbumin,urine random 12.00 05/12/2022 07:41 AM    LDL, calculated 73.8 05/12/2022 07:41 AM    Creatinine 1.24 05/12/2022 07:41 AM     Currently taking:   Key Antihyperglycemic Medications     Patient is on no antihyperglycemic meds. Weight has increased. Planning to change diet/exercise to lose weight. Wt Readings from Last 3 Encounters:   06/14/22 291 lb (132 kg)   02/11/22 284 lb (128.8 kg)   12/06/21 286 lb (129.7 kg)     HTN Follow up - BP controlled at last recheck: Started potassium supplement in Mid May.    BP Readings from Last 3 Encounters:   06/14/22 130/80   02/11/22 134/70   12/06/21 132/85     Currently taking:   Key CAD CHF Meds             amLODIPine (NORVASC) 10 mg tablet (Taking) TAKE 1 TABLET BY MOUTH EVERY DAY    hydroCHLOROthiazide (HYDRODIURIL) 50 mg tablet (Taking) TAKE 1 TABLET BY MOUTH EVERY DAY    carvediloL (COREG) 25 mg tablet (Taking) TAKE 1 TABLET BY MOUTH TWICE A DAY    Eliquis 5 mg tablet (Taking) TAKE 1 TABLET BY MOUTH TWICE A DAY simvastatin (ZOCOR) 20 mg tablet (Taking) TAKE 1 TABLET BY MOUTH EVERY DAY    flecainide (TAMBOCOR) 100 mg tablet (Taking) Take 100 mg by mouth two (2) times a day. PRN a fib    amLODIPine (NORVASC) 10 mg tablet (Discontinued) TAKE 1 TABLET BY MOUTH EVERY DAY        Lab Results   Component Value Date/Time    Creatinine 1.24 05/12/2022 07:41 AM     Lab Results   Component Value Date/Time    Cholesterol, total 133 05/12/2022 07:41 AM    HDL Cholesterol 38 05/12/2022 07:41 AM    LDL, calculated 73.8 05/12/2022 07:41 AM    VLDL, calculated 21.2 05/12/2022 07:41 AM    Triglyceride 106 05/12/2022 07:41 AM    CHOL/HDL Ratio 3.5 05/12/2022 07:41 AM     ROS  Review of Systems   Respiratory: Negative for shortness of breath. Cardiovascular: Negative for chest pain and palpitations. Neurological: Negative for dizziness, loss of consciousness and weakness. EXAM  Physical Exam  Vitals and nursing note reviewed. Constitutional:       General: He is not in acute distress. Appearance: He is well-developed. HENT:      Head: Normocephalic and atraumatic. Neck:      Vascular: No JVD. Cardiovascular:      Rate and Rhythm: Normal rate and regular rhythm. Heart sounds: Normal heart sounds. Pulmonary:      Effort: Pulmonary effort is normal. No respiratory distress. Breath sounds: Normal breath sounds. Musculoskeletal:      Cervical back: Neck supple. Skin:     General: Skin is warm and dry. Neurological:      Mental Status: He is alert and oriented to person, place, and time. Psychiatric:         Mood and Affect: Mood normal.         Behavior: Behavior normal.         Thought Content:  Thought content normal.         Judgment: Judgment normal.       Diabetic foot exam:     Bilateral:   Reflexes 1+    Vibratory sensation  not impaired  Proprioception normal   Sharp/dull discrimination  not impaired   Micro Filament test  not impaired   Pulse DP: 2+ (normal)  Pulse PT: 2+ (normal)  Deformities: Mild -  callus throughout dorsal feet   Toenails mildly thick and yellowed    ASSESSMENT/PLAN  Encounter Diagnoses     ICD-10-CM ICD-9-CM   1. Hypokalemia  E87.6 276.8   2. Essential hypertension  I10 401.9   3. Severe obesity ([facility] [address]  E66.01 278.01   4. Paroxysmal atrial fibrillation (HCC)  I48.0 427.31   5. Type 2 diabetes mellitus with microalbuminuria, without long-term current use of insulin (HCC)  E11.29 250.40    R80.9 791.0   6. Hypercholesteremia  E78.00 272.0     Orders Placed This Encounter    POTASSIUM    HM DIABETES FOOT EXAM   Continue routine follow ups with cardiology. Discussed diet/exercise and options for/importance of losing weight.

## 2022-06-14 NOTE — PROGRESS NOTES
Reviewed record in preparation for visit and have obtained necessary documentation. Identified pt with two pt identifiers(name and ). Chief Complaint   Patient presents with    Hypertension     follow up    Diabetes     follow up     There were no vitals filed for this visit. Health Maintenance Due   Topic Date Due    Eye Exam  Never done    DTaP/Tdap/Td  (1 - Tdap) Never done    COVID-19 Vaccine (3 - Booster for [employer] series) 2021    Shingles Vaccine (2 of 2) 10/05/2021    Diabetic Foot Care  2022       Mr. Marshall Bradshaw has a reminder for a \"due or due soon\" health maintenance. I have asked that he discuss this further with his primary care provider for follow-up on this health maintenance. Coordination of Care Questionnaire:  :     1) Have you been to an emergency room, urgent care clinic since your last visit? no   Hospitalized since your last visit? no            2) Have you seen or consulted any other health care providers outside of [address] [place] [address] since your last visit? Yes    (Include any pap smears or colon screenings in this section.)    3. For patients aged 39-70: Has the patient had a colonoscopy / FIT/ Cologuard? Yes        If the patient is female:  4. For patients aged 41-77: Has the patient had a mammogram within the past 2 years? no       5. For patients aged 21-65: Has the patient had a pap smear? No     In the event something were to happen to you and you were unable to speak on your behalf, do you have an Advance Directive/ Living Will in place stating your wishes? NO    Do you have an Advance Directive on file? No      6) Are you interested in receiving information on Advance Directives? No      Patient is accompanied by self   I have received verbal consent from Brenton Ross to discuss any/all medical information while they are present in the room.

## 2022-06-15 LAB — POTASSIUM SERPL-SCNC: 3.5 MMOL/L (ref 3.5–5.1)

## 2022-06-17 VITALS
SYSTOLIC BLOOD PRESSURE: 130 MMHG | DIASTOLIC BLOOD PRESSURE: 80 MMHG | HEIGHT: 73 IN | TEMPERATURE: 98.4 F | RESPIRATION RATE: 16 BRPM | HEART RATE: 66 BPM | BODY MASS INDEX: 38.57 KG/M2 | OXYGEN SATURATION: 94 % | WEIGHT: 291 LBS

## 2022-07-06 DIAGNOSIS — I10 ESSENTIAL HYPERTENSION: ICD-10-CM

## 2022-07-06 RX ORDER — HYDROCHLOROTHIAZIDE 50 MG/1
TABLET ORAL
Qty: 90 TABLET | Refills: 1 | Status: SHIPPED | OUTPATIENT
Start: 2022-07-06

## 2022-07-06 RX ORDER — CARVEDILOL 25 MG/1
TABLET ORAL
Qty: 180 TABLET | Refills: 1 | Status: SHIPPED | OUTPATIENT
Start: 2022-07-06

## 2022-09-14 ENCOUNTER — OFFICE VISIT (OUTPATIENT)
Dept: INTERNAL MEDICINE CLINIC | Age: 67
End: 2022-09-14
Payer: MEDICARE

## 2022-09-14 VITALS
WEIGHT: 285.2 LBS | DIASTOLIC BLOOD PRESSURE: 81 MMHG | BODY MASS INDEX: 37.8 KG/M2 | SYSTOLIC BLOOD PRESSURE: 124 MMHG | HEIGHT: 73 IN | TEMPERATURE: 98 F | HEART RATE: 59 BPM

## 2022-09-14 DIAGNOSIS — E66.01 SEVERE OBESITY (HCC): ICD-10-CM

## 2022-09-14 DIAGNOSIS — I48.0 PAROXYSMAL ATRIAL FIBRILLATION (HCC): ICD-10-CM

## 2022-09-14 DIAGNOSIS — I10 ESSENTIAL HYPERTENSION: ICD-10-CM

## 2022-09-14 DIAGNOSIS — E11.29 TYPE 2 DIABETES MELLITUS WITH MICROALBUMINURIA, WITHOUT LONG-TERM CURRENT USE OF INSULIN (HCC): Primary | ICD-10-CM

## 2022-09-14 DIAGNOSIS — R80.9 TYPE 2 DIABETES MELLITUS WITH MICROALBUMINURIA, WITHOUT LONG-TERM CURRENT USE OF INSULIN (HCC): Primary | ICD-10-CM

## 2022-09-14 PROCEDURE — G8427 DOCREV CUR MEDS BY ELIG CLIN: HCPCS | Performed by: PHYSICIAN ASSISTANT

## 2022-09-14 PROCEDURE — G8432 DEP SCR NOT DOC, RNG: HCPCS | Performed by: PHYSICIAN ASSISTANT

## 2022-09-14 PROCEDURE — G8417 CALC BMI ABV UP PARAM F/U: HCPCS | Performed by: PHYSICIAN ASSISTANT

## 2022-09-14 PROCEDURE — 3017F COLORECTAL CA SCREEN DOC REV: CPT | Performed by: PHYSICIAN ASSISTANT

## 2022-09-14 PROCEDURE — 1101F PT FALLS ASSESS-DOCD LE1/YR: CPT | Performed by: PHYSICIAN ASSISTANT

## 2022-09-14 PROCEDURE — G8752 SYS BP LESS 140: HCPCS | Performed by: PHYSICIAN ASSISTANT

## 2022-09-14 PROCEDURE — G0463 HOSPITAL OUTPT CLINIC VISIT: HCPCS | Performed by: PHYSICIAN ASSISTANT

## 2022-09-14 PROCEDURE — 2022F DILAT RTA XM EVC RTNOPTHY: CPT | Performed by: PHYSICIAN ASSISTANT

## 2022-09-14 PROCEDURE — G8754 DIAS BP LESS 90: HCPCS | Performed by: PHYSICIAN ASSISTANT

## 2022-09-14 PROCEDURE — G8536 NO DOC ELDER MAL SCRN: HCPCS | Performed by: PHYSICIAN ASSISTANT

## 2022-09-14 PROCEDURE — 99214 OFFICE O/P EST MOD 30 MIN: CPT | Performed by: PHYSICIAN ASSISTANT

## 2022-09-14 RX ORDER — HYDRALAZINE HYDROCHLORIDE 25 MG/1
TABLET, FILM COATED ORAL 3 TIMES DAILY
COMMUNITY

## 2022-09-14 NOTE — PROGRESS NOTES
Brenda Gunderson is a 69 y.o. male  Chief Complaint   Patient presents with    Diabetes    Hypertension     Visit Vitals  /81   Pulse (!) 59   Temp 98 °F (36.7 °C) (Temporal)   Ht 6' 1\" (1.854 m)   Wt 285 lb 3.2 oz (129.4 kg)   BMI 37.63 kg/m²      Health Maintenance Due   Topic Date Due    Eye Exam Retinal or Dilated  Never done    DTaP/Tdap/Td series (1 - Tdap) Never done    COVID-19 Vaccine (3 - Booster for Pfizer series) 08/29/2021    Shingrix Vaccine Age 50> (2 of 2) 10/05/2021    Flu Vaccine (1) Never done     HPI  HTN Follow up - BP controlled:  BP Readings from Last 3 Encounters:   09/14/22 124/81   06/14/22 130/80   02/11/22 134/70     Currently taking:   Key CAD CHF Meds               carvediloL (COREG) 25 mg tablet TAKE 1 TABLET BY MOUTH TWICE A DAY    hydroCHLOROthiazide (HYDRODIURIL) 50 mg tablet TAKE 1 TABLET BY MOUTH EVERY DAY    amLODIPine (NORVASC) 10 mg tablet TAKE 1 TABLET BY MOUTH EVERY DAY    Eliquis 5 mg tablet TAKE 1 TABLET BY MOUTH TWICE A DAY    simvastatin (ZOCOR) 20 mg tablet TAKE 1 TABLET BY MOUTH EVERY DAY    flecainide (TAMBOCOR) 100 mg tablet Take 100 mg by mouth two (2) times a day. PRN a fib          Lab Results   Component Value Date/Time    Creatinine 1.24 05/12/2022 07:41 AM     DM II - less controlled than previously at last check, but pt has stopped drinking OJ. Now having sparkling water with cranberry juice only in the morning. No liquid calories the rest of the day.    Lab Results   Component Value Date/Time    Hemoglobin A1c 7.5 (H) 05/12/2022 07:41 AM    Hemoglobin A1c 6.4 (H) 12/15/2021 07:56 AM    Hemoglobin A1c 7.0 (H) 07/21/2021 07:32 AM    Glucose 142 (H) 05/12/2022 07:41 AM    Microalbumin/Creat ratio (mg/g creat) 123 (H) 05/12/2022 07:41 AM    Microalbumin,urine random 12.00 05/12/2022 07:41 AM    LDL, calculated 73.8 05/12/2022 07:41 AM    Creatinine 1.24 05/12/2022 07:41 AM     Currently taking:   Key Antihyperglycemic Medications       Patient is on no antihyperglycemic meds. Pt has lost 6 lbs. Wt Readings from Last 3 Encounters:   09/14/22 285 lb 3.2 oz (129.4 kg)   06/14/22 291 lb (132 kg)   02/11/22 284 lb (128.8 kg)   Cardiologist wants pt to lose 20 lbs by December. Pt is motivated. ROS  Review of Systems   Respiratory:  Negative for shortness of breath. Cardiovascular:  Negative for chest pain and palpitations. Musculoskeletal:  Positive for back pain. Negative for falls. Neurological:  Negative for dizziness, loss of consciousness and weakness. Psychiatric/Behavioral:  Negative for depression. EXAM  Physical Exam  Vitals and nursing note reviewed. Constitutional:       General: He is not in acute distress. Appearance: He is well-developed. HENT:      Head: Normocephalic and atraumatic. Neck:      Vascular: No JVD. Cardiovascular:      Rate and Rhythm: Normal rate and regular rhythm. Heart sounds: Normal heart sounds. Pulmonary:      Effort: Pulmonary effort is normal. No respiratory distress. Breath sounds: Normal breath sounds. Musculoskeletal:      Cervical back: Neck supple. Skin:     General: Skin is warm and dry. Neurological:      Mental Status: He is alert and oriented to person, place, and time. Psychiatric:         Mood and Affect: Mood normal.         Behavior: Behavior normal.         Thought Content: Thought content normal.         Judgment: Judgment normal.     ASSESSMENT/PLAN  Encounter Diagnoses     ICD-10-CM ICD-9-CM   1. Type 2 diabetes mellitus with microalbuminuria, without long-term current use of insulin (HCC)  E11.29 250.40    R80.9 791.0   2. Essential hypertension  I10 401.9   3. Paroxysmal atrial fibrillation ([facility])  I48.0 427.31   4.  Severe obesity ([facility] [address]  E66.01 278.01     Orders Placed This Encounter    METABOLIC PANEL, COMPREHENSIVE    HEMOGLOBIN A1C WITH EAG    LIPID PANEL    REFERRAL TO DIABETIC EDUCATION    REFERRAL TO WEIGHT LOSS   Continue routine follow ups with cardiology.

## 2022-09-14 NOTE — PROGRESS NOTES
Reviewed record in preparation for visit and have obtained necessary documentation. Identified pt with two pt identifiers(name and ). Chief Complaint   Patient presents with    Diabetes    Hypertension     Vitals:    22 0930   Temp: 98 °F (36.7 °C)   Weight: 285 lb 3.2 oz (129.4 kg)        Health Maintenance Due   Topic Date Due    Eye Exam  Never done    DTaP/Tdap/Td  (1 - Tdap) Never done    COVID-19 Vaccine (3 - Booster for Pfizer series) 2021    Shingles Vaccine (2 of 2) 10/05/2021    Yearly Flu Vaccine (1) Never done       Mr. Vashti Nevarez has a reminder for a \"due or due soon\" health maintenance. I have asked that he discuss this further with his primary care provider for follow-up on this health maintenance. Coordination of Care Questionnaire:  :     1) Have you been to an emergency room, urgent care clinic since your last visit? no   Hospitalized since your last visit? no             2) Have you seen or consulted any other health care providers outside of [address] [place] [address] since your last visit? no  (Include any pap smears or colon screenings in this section.)    3. For patients aged 39-70: Has the patient had a colonoscopy / FIT/ Cologuard? Yes - Care Gap present. Most recent result on file      If the patient is female:  4. For patients aged 41-77: Has the patient had a mammogram within the past 2 years? No       5. For patients aged 21-65: Has the patient had a pap smear? NA - based on age or sex      In the event something were to happen to you and you were unable to speak on your behalf, do you have an Advance Directive/ Living Will in place stating your wishes? NO    Do you have an Advance Directive on file? no    6) Are you interested in receiving information on Advance Directives? no    Patient is accompanied by self I have received verbal consent from Gonzalo Sanchez to discuss any/all medical information while they are present in the room.

## 2022-09-18 ENCOUNTER — HOSPITAL ENCOUNTER (EMERGENCY)
Dept: MRI IMAGING | Age: 67
Discharge: HOME | End: 2022-09-18
Attending: EMERGENCY MEDICINE
Payer: MEDICARE

## 2022-09-18 ENCOUNTER — HOSPITAL ENCOUNTER (EMERGENCY)
Age: 67
Discharge: HOME | End: 2022-09-18
Attending: STUDENT IN AN ORGANIZED HEALTH CARE EDUCATION/TRAINING PROGRAM
Payer: MEDICARE

## 2022-09-18 VITALS
TEMPERATURE: 98.3 F | SYSTOLIC BLOOD PRESSURE: 139 MMHG | WEIGHT: 278 LBS | HEART RATE: 60 BPM | BODY MASS INDEX: 36.84 KG/M2 | HEIGHT: 73 IN | OXYGEN SATURATION: 96 % | DIASTOLIC BLOOD PRESSURE: 91 MMHG | RESPIRATION RATE: 18 BRPM

## 2022-09-18 VITALS — BODY MASS INDEX: 36.68 KG/M2 | WEIGHT: 278 LBS

## 2022-09-18 DIAGNOSIS — M54.50 ACUTE RIGHT-SIDED LOW BACK PAIN WITHOUT SCIATICA: Primary | ICD-10-CM

## 2022-09-18 LAB
ANION GAP SERPL CALC-SCNC: 6 MMOL/L (ref 5–15)
BASOPHILS # BLD: 0 K/UL (ref 0–0.1)
BASOPHILS NFR BLD: 0 % (ref 0–1)
BUN SERPL-MCNC: 31 MG/DL (ref 6–20)
BUN/CREAT SERPL: 23 (ref 12–20)
CALCIUM SERPL-MCNC: 9.6 MG/DL (ref 8.5–10.1)
CHLORIDE SERPL-SCNC: 101 MMOL/L (ref 97–108)
CO2 SERPL-SCNC: 29 MMOL/L (ref 21–32)
CREAT SERPL-MCNC: 1.33 MG/DL (ref 0.7–1.3)
DIFFERENTIAL METHOD BLD: ABNORMAL
EOSINOPHIL # BLD: 0.1 K/UL (ref 0–0.4)
EOSINOPHIL NFR BLD: 1 % (ref 0–7)
ERYTHROCYTE [DISTWIDTH] IN BLOOD BY AUTOMATED COUNT: 14.6 % (ref 11.5–14.5)
GLUCOSE SERPL-MCNC: 143 MG/DL (ref 65–100)
HCT VFR BLD AUTO: 45.9 % (ref 36.6–50.3)
HGB BLD-MCNC: 15.4 G/DL (ref 12.1–17)
IMM GRANULOCYTES # BLD AUTO: 0 K/UL (ref 0–0.04)
IMM GRANULOCYTES NFR BLD AUTO: 0 % (ref 0–0.5)
LYMPHOCYTES # BLD: 1.2 K/UL (ref 0.8–3.5)
LYMPHOCYTES NFR BLD: 18 % (ref 12–49)
MCH RBC QN AUTO: 28.9 PG (ref 26–34)
MCHC RBC AUTO-ENTMCNC: 33.6 G/DL (ref 30–36.5)
MCV RBC AUTO: 86.3 FL (ref 80–99)
MONOCYTES # BLD: 0.7 K/UL (ref 0–1)
MONOCYTES NFR BLD: 10 % (ref 5–13)
NEUTS SEG # BLD: 4.9 K/UL (ref 1.8–8)
NEUTS SEG NFR BLD: 71 % (ref 32–75)
NRBC # BLD: 0 K/UL (ref 0–0.01)
NRBC BLD-RTO: 0 PER 100 WBC
PLATELET # BLD AUTO: 246 K/UL (ref 150–400)
PMV BLD AUTO: 10.7 FL (ref 8.9–12.9)
POTASSIUM SERPL-SCNC: 3.6 MMOL/L (ref 3.5–5.1)
RBC # BLD AUTO: 5.32 M/UL (ref 4.1–5.7)
SODIUM SERPL-SCNC: 136 MMOL/L (ref 136–145)
WBC # BLD AUTO: 6.9 K/UL (ref 4.1–11.1)

## 2022-09-18 PROCEDURE — 96374 THER/PROPH/DIAG INJ IV PUSH: CPT

## 2022-09-18 PROCEDURE — 99285 EMERGENCY DEPT VISIT HI MDM: CPT

## 2022-09-18 PROCEDURE — 85025 COMPLETE CBC W/AUTO DIFF WBC: CPT

## 2022-09-18 PROCEDURE — 72158 MRI LUMBAR SPINE W/O & W/DYE: CPT

## 2022-09-18 PROCEDURE — 96375 TX/PRO/DX INJ NEW DRUG ADDON: CPT

## 2022-09-18 PROCEDURE — 36415 COLL VENOUS BLD VENIPUNCTURE: CPT

## 2022-09-18 PROCEDURE — 74011250636 HC RX REV CODE- 250/636: Performed by: EMERGENCY MEDICINE

## 2022-09-18 PROCEDURE — A9576 INJ PROHANCE MULTIPACK: HCPCS | Performed by: EMERGENCY MEDICINE

## 2022-09-18 PROCEDURE — 80048 BASIC METABOLIC PNL TOTAL CA: CPT

## 2022-09-18 PROCEDURE — 74011250636 HC RX REV CODE- 250/636: Performed by: STUDENT IN AN ORGANIZED HEALTH CARE EDUCATION/TRAINING PROGRAM

## 2022-09-18 RX ORDER — HYDROCODONE BITARTRATE AND ACETAMINOPHEN 5; 325 MG/1; MG/1
1 TABLET ORAL
Qty: 12 TABLET | Refills: 0 | Status: SHIPPED | OUTPATIENT
Start: 2022-09-18 | End: 2022-09-21

## 2022-09-18 RX ORDER — HYDROMORPHONE HYDROCHLORIDE 1 MG/ML
1 INJECTION, SOLUTION INTRAMUSCULAR; INTRAVENOUS; SUBCUTANEOUS ONCE
Status: COMPLETED | OUTPATIENT
Start: 2022-09-18 | End: 2022-09-18

## 2022-09-18 RX ORDER — DIAZEPAM 10 MG/2ML
5 INJECTION INTRAMUSCULAR ONCE
Status: COMPLETED | OUTPATIENT
Start: 2022-09-18 | End: 2022-09-18

## 2022-09-18 RX ADMIN — DIAZEPAM 5 MG: 5 INJECTION, SOLUTION INTRAMUSCULAR; INTRAVENOUS at 08:26

## 2022-09-18 RX ADMIN — HYDROMORPHONE HYDROCHLORIDE 1 MG: 1 INJECTION, SOLUTION INTRAMUSCULAR; INTRAVENOUS; SUBCUTANEOUS at 06:38

## 2022-09-18 RX ADMIN — GADOTERIDOL 20 ML: 279.3 INJECTION, SOLUTION INTRAVENOUS at 09:13

## 2022-09-18 NOTE — DISCHARGE INSTRUCTIONS
Thank you for allowing us to provide you with medical care today. We realize that you have many choices for your emergency care needs. We thank you for choosing [facility]. Please choose us in the future for any continued health care needs. The exam and treatment you received in the Emergency Department were for an emergent problem and are not intended as complete care. It is important that you follow up with a doctor, nurse practitioner, or physician's assistant for ongoing care. If your symptoms worsen or you do not improve as expected and you are unable to reach your usual health care provider, you should return to the Emergency Department. We are available 24 hours a day. Please make an appointment with your health care provider(s) for follow up of your Emergency Department visit. Take this sheet with you when you go to your follow-up visit.

## 2022-09-18 NOTE — ED TRIAGE NOTES
Emergency Room Nursing Note        Patient Name: Ramana Yuen      : 1955             MRN: [id number]      Chief Complaint:  Back Pain      Admit Diagnosis: No admission diagnoses are documented for this encounter. Admitting Provider: No admitting provider for patient encounter. Surgery: * No surgery found *           Patient arrived to the ER via EMS from home with complaints of a Chronic Back Pain that was exacerbated per patient by steroid injections yesterday.  Per EMS pt has a Hx of HTN, HLD,           Lines:        Signed by: Gulshan Anders RN, PATEL, BSN, [facility]                                              2022 at 6:24 AM

## 2022-09-18 NOTE — ED NOTES
7 AM  Change of shift. Care of patient taken over from Dr. Marcela Kirkpatrick; H&P reviewed, bedside handoff complete. Awaiting MRI. 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 Patient has had no lower extremity weakness, perineal numbness, loss of bowel or bladder control. Neurosurgery consulted. Dr. Kathryn Bella felt that if the patient had no neurologic symptoms he could follow-up as an outpatient. Discussed my clinical impression(s), any labs and/or radiology results with the patient. I answered any questions and addressed any concerns. Discussed the importance of following up with their primary care physician and/or specialist(s). Discussed signs or symptoms that would warrant return back to the ER for further evaluation. The patient is agreeable with discharge.     Ronda Pretty MD
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
## 2022-09-18 NOTE — ED NOTES
Emergency Room Nursing Communication Tool        Verbal shift change report given to Arlyn Cole RN (incoming nurse) by Tiesha Sunshine RN (outgoing nurse) on Misha Mcmanus a 69 y.o. male and born 1955 who arrived at the hospital on 9/18/2022  6:21 AM. Report included the following information SBAR, Kardex, MAR, and Recent Results. Significant changes during shift: Awaiting MRI, checklist completed. Issues for physician to address: Patient disclosed he is Claustrophobic. Code Status: No Order     Chief Complaint: Back Pain     Admit Diagnosis: No admission diagnoses are documented for this encounter. Admitting Provider: No admitting provider for patient encounter.      Surgery: * No surgery found *     Infections: No current active infections     Allergies: Lisinopril     Current diet: No diet orders on file     Lines:   Peripheral IV 09/18/22 Anterior;Proximal;Right Forearm (Active)                Vital Signs:   Patient Vitals for the past 12 hrs:   Temp Pulse Resp BP SpO2   09/18/22 0700 -- -- -- 113/73 --   09/18/22 0645 -- -- -- 130/82 --   09/18/22 0630 -- -- -- 137/88 --   09/18/22 0622 98.3 °F (36.8 °C) 68 18 (!) 153/92 95 %      Intake & Output:   No intake or output data in the 24 hours ending 09/18/22 0737   Laboratory Results:     Recent Results (from the past 12 hour(s))   CBC WITH AUTOMATED DIFF    Collection Time: 09/18/22  6:34 AM   Result Value Ref Range    WBC 6.9 4.1 - 11.1 K/uL    RBC 5.32 4.10 - 5.70 M/uL    HGB 15.4 12.1 - 17.0 g/dL    HCT 45.9 36.6 - 50.3 %    MCV 86.3 80.0 - 99.0 FL    MCH 28.9 26.0 - 34.0 PG    MCHC 33.6 30.0 - 36.5 g/dL    RDW 14.6 (H) 11.5 - 14.5 %    PLATELET 654 658 - 791 K/uL    MPV 10.7 8.9 - 12.9 FL    NRBC 0.0 0  WBC    ABSOLUTE NRBC 0.00 0.00 - 0.01 K/uL    NEUTROPHILS 71 32 - 75 %    LYMPHOCYTES 18 12 - 49 %    MONOCYTES 10 5 - 13 %    EOSINOPHILS 1 0 - 7 %    BASOPHILS 0 0 - 1 %    IMMATURE GRANULOCYTES 0 0.0 - 0.5 %    ABS. NEUTROPHILS 4.9 1.8 - 8.0 K/UL    ABS. LYMPHOCYTES 1.2 0.8 - 3.5 K/UL    ABS. MONOCYTES 0.7 0.0 - 1.0 K/UL    ABS. EOSINOPHILS 0.1 0.0 - 0.4 K/UL    ABS. BASOPHILS 0.0 0.0 - 0.1 K/UL    ABS. IMM. GRANS. 0.0 0.00 - 0.04 K/UL    DF AUTOMATED     METABOLIC PANEL, BASIC    Collection Time: 09/18/22  6:34 AM   Result Value Ref Range    Sodium 136 136 - 145 mmol/L    Potassium 3.6 3.5 - 5.1 mmol/L    Chloride 101 97 - 108 mmol/L    CO2 29 21 - 32 mmol/L    Anion gap 6 5 - 15 mmol/L    Glucose 143 (H) 65 - 100 mg/dL    BUN 31 (H) 6 - 20 MG/DL    Creatinine 1.33 (H) 0.70 - 1.30 MG/DL    BUN/Creatinine ratio 23 (H) 12 - 20      GFR est AA >60 >60 ml/min/1.73m2    GFR est non-AA 53 (L) >60 ml/min/1.73m2    Calcium 9.6 8.5 - 10.1 MG/DL            Opportunity for questions and clarifications were given to the incoming nurse. Patient's bed locked and is in low position, side rails up x2, door open PRN, call bell within reach of patient and patient not in distress.       Signed by: Ngozi Jasmine RN, PATEL, BSN, VIA [facility]                       9/18/2022 at 7:37 AM

## 2022-11-29 DIAGNOSIS — I10 ESSENTIAL HYPERTENSION: ICD-10-CM

## 2022-11-29 RX ORDER — AMLODIPINE BESYLATE 10 MG/1
TABLET ORAL
Qty: 90 TABLET | Refills: 1 | Status: SHIPPED | OUTPATIENT
Start: 2022-11-29

## 2022-11-30 DIAGNOSIS — E11.29 TYPE 2 DIABETES MELLITUS WITH MICROALBUMINURIA, WITHOUT LONG-TERM CURRENT USE OF INSULIN (HCC): ICD-10-CM

## 2022-11-30 DIAGNOSIS — R80.9 TYPE 2 DIABETES MELLITUS WITH MICROALBUMINURIA, WITHOUT LONG-TERM CURRENT USE OF INSULIN (HCC): ICD-10-CM

## 2022-11-30 LAB
ALBUMIN SERPL-MCNC: 3.8 G/DL (ref 3.5–5)
ALBUMIN/GLOB SERPL: 1.1 {RATIO} (ref 1.1–2.2)
ALP SERPL-CCNC: 85 U/L (ref 45–117)
ALT SERPL-CCNC: 35 U/L (ref 12–78)
ANION GAP SERPL CALC-SCNC: 6 MMOL/L (ref 5–15)
AST SERPL-CCNC: 18 U/L (ref 15–37)
BILIRUB SERPL-MCNC: 1.1 MG/DL (ref 0.2–1)
BUN SERPL-MCNC: 20 MG/DL (ref 6–20)
BUN/CREAT SERPL: 17 (ref 12–20)
CALCIUM SERPL-MCNC: 9.6 MG/DL (ref 8.5–10.1)
CHLORIDE SERPL-SCNC: 104 MMOL/L (ref 97–108)
CHOLEST SERPL-MCNC: 124 MG/DL
CO2 SERPL-SCNC: 30 MMOL/L (ref 21–32)
CREAT SERPL-MCNC: 1.17 MG/DL (ref 0.7–1.3)
EST. AVERAGE GLUCOSE BLD GHB EST-MCNC: 137 MG/DL
GLOBULIN SER CALC-MCNC: 3.4 G/DL (ref 2–4)
GLUCOSE SERPL-MCNC: 127 MG/DL (ref 65–100)
HBA1C MFR BLD: 6.4 % (ref 4–5.6)
HDLC SERPL-MCNC: 38 MG/DL
HDLC SERPL: 3.3 {RATIO} (ref 0–5)
LDLC SERPL CALC-MCNC: 65.4 MG/DL (ref 0–100)
POTASSIUM SERPL-SCNC: 3.5 MMOL/L (ref 3.5–5.1)
PROT SERPL-MCNC: 7.2 G/DL (ref 6.4–8.2)
SODIUM SERPL-SCNC: 140 MMOL/L (ref 136–145)
TRIGL SERPL-MCNC: 103 MG/DL (ref ?–150)
VLDLC SERPL CALC-MCNC: 20.6 MG/DL

## 2022-12-04 NOTE — PROGRESS NOTES
Diabetes and cholesterol are BEAUTIFULLY controlled. Keep up the good work! Liver enzymes, kidney function, and electrolytes are all normal/acceptable. Good!

## 2022-12-12 DIAGNOSIS — E78.00 HYPERCHOLESTEREMIA: ICD-10-CM

## 2022-12-12 RX ORDER — SIMVASTATIN 20 MG/1
TABLET, FILM COATED ORAL
Qty: 90 TABLET | Refills: 1 | Status: SHIPPED | OUTPATIENT
Start: 2022-12-12

## 2022-12-30 DIAGNOSIS — I10 ESSENTIAL HYPERTENSION: ICD-10-CM

## 2023-01-01 RX ORDER — HYDROCHLOROTHIAZIDE 50 MG/1
TABLET ORAL
Qty: 90 TABLET | Refills: 1 | Status: SHIPPED | OUTPATIENT
Start: 2023-01-01

## 2023-01-01 RX ORDER — CARVEDILOL 25 MG/1
TABLET ORAL
Qty: 180 TABLET | Refills: 1 | Status: SHIPPED | OUTPATIENT
Start: 2023-01-01

## 2023-01-30 ENCOUNTER — OFFICE VISIT (OUTPATIENT)
Dept: INTERNAL MEDICINE CLINIC | Age: 68
End: 2023-01-30
Payer: MEDICARE

## 2023-01-30 VITALS
TEMPERATURE: 98.6 F | HEART RATE: 77 BPM | SYSTOLIC BLOOD PRESSURE: 130 MMHG | OXYGEN SATURATION: 96 % | DIASTOLIC BLOOD PRESSURE: 79 MMHG | HEIGHT: 73 IN | RESPIRATION RATE: 14 BRPM | WEIGHT: 272.4 LBS | BODY MASS INDEX: 36.1 KG/M2

## 2023-01-30 DIAGNOSIS — I48.0 PAROXYSMAL ATRIAL FIBRILLATION (HCC): ICD-10-CM

## 2023-01-30 DIAGNOSIS — I10 ESSENTIAL HYPERTENSION: ICD-10-CM

## 2023-01-30 DIAGNOSIS — R80.9 TYPE 2 DIABETES MELLITUS WITH MICROALBUMINURIA, WITHOUT LONG-TERM CURRENT USE OF INSULIN (HCC): ICD-10-CM

## 2023-01-30 DIAGNOSIS — E11.29 TYPE 2 DIABETES MELLITUS WITH MICROALBUMINURIA, WITHOUT LONG-TERM CURRENT USE OF INSULIN (HCC): ICD-10-CM

## 2023-01-30 DIAGNOSIS — E66.01 SEVERE OBESITY (HCC): Primary | ICD-10-CM

## 2023-01-30 PROCEDURE — G8417 CALC BMI ABV UP PARAM F/U: HCPCS | Performed by: PHYSICIAN ASSISTANT

## 2023-01-30 PROCEDURE — G8427 DOCREV CUR MEDS BY ELIG CLIN: HCPCS | Performed by: PHYSICIAN ASSISTANT

## 2023-01-30 PROCEDURE — G0463 HOSPITAL OUTPT CLINIC VISIT: HCPCS | Performed by: PHYSICIAN ASSISTANT

## 2023-01-30 PROCEDURE — 1101F PT FALLS ASSESS-DOCD LE1/YR: CPT | Performed by: PHYSICIAN ASSISTANT

## 2023-01-30 PROCEDURE — 3046F HEMOGLOBIN A1C LEVEL >9.0%: CPT | Performed by: PHYSICIAN ASSISTANT

## 2023-01-30 PROCEDURE — 99214 OFFICE O/P EST MOD 30 MIN: CPT | Performed by: PHYSICIAN ASSISTANT

## 2023-01-30 PROCEDURE — 2022F DILAT RTA XM EVC RTNOPTHY: CPT | Performed by: PHYSICIAN ASSISTANT

## 2023-01-30 PROCEDURE — G8432 DEP SCR NOT DOC, RNG: HCPCS | Performed by: PHYSICIAN ASSISTANT

## 2023-01-30 PROCEDURE — G8536 NO DOC ELDER MAL SCRN: HCPCS | Performed by: PHYSICIAN ASSISTANT

## 2023-01-30 PROCEDURE — 3017F COLORECTAL CA SCREEN DOC REV: CPT | Performed by: PHYSICIAN ASSISTANT

## 2023-01-30 RX ORDER — TRAMADOL HYDROCHLORIDE 50 MG/1
50 TABLET ORAL AS NEEDED
COMMUNITY
Start: 2022-12-22

## 2023-01-30 NOTE — PROGRESS NOTES
Monserrat Young is a 69 y.o. male  Chief Complaint   Patient presents with    Follow-up     Review last labs and needs a referral      Visit Vitals  /79 (BP 1 Location: Left upper arm, BP Patient Position: Sitting, BP Cuff Size: Adult)   Pulse 77   Temp 98.6 °F (37 °C) (Temporal)   Resp 14   Ht 6' 1\" (1.854 m)   Wt 272 lb 6.4 oz (123.6 kg)   SpO2 96%   BMI 35.94 kg/m²      Health Maintenance Due   Topic Date Due    Eye Exam Retinal or Dilated  Never done    DTaP/Tdap/Td series (1 - Tdap) Never done    COVID-19 Vaccine (3 - Booster for Pfizer series) 05/24/2021    Shingles Vaccine (2 of 2) 10/05/2021    Flu Vaccine (1) Never done    Medicare Yearly Exam  10/19/2022       HPI  DM II - controlled with diet. Lab Results   Component Value Date/Time    Hemoglobin A1c 6.4 (H) 11/30/2022 09:33 AM    Hemoglobin A1c 7.5 (H) 05/12/2022 07:41 AM    Hemoglobin A1c 6.4 (H) 12/15/2021 07:56 AM    Glucose 127 (H) 11/30/2022 09:33 AM    Microalbumin/Creat ratio (mg/g creat) 123 (H) 05/12/2022 07:41 AM    Microalbumin,urine random 12.00 05/12/2022 07:41 AM    LDL, calculated 65.4 11/30/2022 09:33 AM    Creatinine 1.17 11/30/2022 09:33 AM     Currently taking:   Key Antihyperglycemic Medications       Patient is on no antihyperglycemic meds. Weight loss with increasing exercise, watching diet.    Wt Readings from Last 3 Encounters:   01/30/23 272 lb 6.4 oz (123.6 kg)   09/18/22 278 lb (126.1 kg)   09/18/22 278 lb (126.1 kg)     HTN & hx afib Follow up - BP controlled:  BP Readings from Last 3 Encounters:   01/30/23 130/79   09/18/22 (!) 139/91   09/14/22 124/81     Currently taking:   Key CAD CHF Meds               hydroCHLOROthiazide (HYDRODIURIL) 50 mg tablet (Taking) TAKE 1 TABLET BY MOUTH EVERY DAY    carvediloL (COREG) 25 mg tablet (Taking) TAKE 1 TABLET BY MOUTH TWICE A DAY    simvastatin (ZOCOR) 20 mg tablet (Taking) TAKE 1 TABLET BY MOUTH EVERY DAY    amLODIPine (NORVASC) 10 mg tablet (Taking) TAKE 1 TABLET BY MOUTH EVERY DAY    hydrALAZINE (APRESOLINE) 25 mg tablet (Taking) three (3) times daily. Eliquis 5 mg tablet (Taking) TAKE 1 TABLET BY MOUTH TWICE A DAY          Lab Results   Component Value Date/Time    Creatinine 1.17 11/30/2022 09:33 AM     Concerned about an imaging report result. Will send me a copy. Would like to see Nephrology if necessary. ROS  Review of Systems   Respiratory:  Negative for shortness of breath. Cardiovascular:  Negative for chest pain and palpitations. Neurological:  Negative for dizziness, loss of consciousness and weakness. EXAM  Physical Exam  Vitals and nursing note reviewed. Constitutional:       General: He is not in acute distress. Appearance: He is well-developed. HENT:      Head: Normocephalic and atraumatic. Neck:      Vascular: No JVD. Cardiovascular:      Rate and Rhythm: Normal rate and regular rhythm. Heart sounds: Normal heart sounds. Pulmonary:      Effort: Pulmonary effort is normal. No respiratory distress. Breath sounds: Normal breath sounds. Musculoskeletal:      Cervical back: Neck supple. Skin:     General: Skin is warm and dry. Neurological:      Mental Status: He is alert and oriented to person, place, and time. Psychiatric:         Mood and Affect: Mood normal.         Behavior: Behavior normal.         Thought Content: Thought content normal.         Judgment: Judgment normal.     ASSESSMENT/PLAN  Encounter Diagnoses     ICD-10-CM ICD-9-CM   1. Severe obesity ([facility] [address]  E66.01 278.01   2. Type 2 diabetes mellitus with microalbuminuria, without long-term current use of insulin (HCC)  E11.29 250.40    R80.9 791.0   3. Paroxysmal atrial fibrillation (HCC)  I48.0 427.31   4. Essential hypertension  I10 401.9     Reviewed labs and Congratulated pt on weight loss success and encouraged continued efforts! Continue routine follow ups with cardiology. Instructed pt to send me the scan in question via My Chart.

## 2023-04-03 ENCOUNTER — OFFICE VISIT (OUTPATIENT)
Dept: INTERNAL MEDICINE CLINIC | Age: 68
End: 2023-04-03
Payer: MEDICARE

## 2023-04-03 DIAGNOSIS — E11.29 TYPE 2 DIABETES MELLITUS WITH MICROALBUMINURIA, WITHOUT LONG-TERM CURRENT USE OF INSULIN (HCC): ICD-10-CM

## 2023-04-03 DIAGNOSIS — Z00.00 ENCOUNTER FOR MEDICARE ANNUAL WELLNESS EXAM: Primary | ICD-10-CM

## 2023-04-03 DIAGNOSIS — R80.9 TYPE 2 DIABETES MELLITUS WITH MICROALBUMINURIA, WITHOUT LONG-TERM CURRENT USE OF INSULIN (HCC): ICD-10-CM

## 2023-04-03 DIAGNOSIS — I10 ESSENTIAL HYPERTENSION: ICD-10-CM

## 2023-04-03 DIAGNOSIS — Z71.84 TRAVEL ADVICE ENCOUNTER: ICD-10-CM

## 2023-04-03 DIAGNOSIS — E66.01 SEVERE OBESITY (HCC): ICD-10-CM

## 2023-04-03 LAB
ALBUMIN SERPL-MCNC: 4 G/DL (ref 3.5–5)
ALBUMIN/GLOB SERPL: 1 (ref 1.1–2.2)
ALP SERPL-CCNC: 101 U/L (ref 45–117)
ALT SERPL-CCNC: 31 U/L (ref 12–78)
ANION GAP SERPL CALC-SCNC: 3 MMOL/L (ref 5–15)
AST SERPL-CCNC: 18 U/L (ref 15–37)
BILIRUB SERPL-MCNC: 0.9 MG/DL (ref 0.2–1)
BUN SERPL-MCNC: 15 MG/DL (ref 6–20)
BUN/CREAT SERPL: 13 (ref 12–20)
CALCIUM SERPL-MCNC: 9.9 MG/DL (ref 8.5–10.1)
CHLORIDE SERPL-SCNC: 101 MMOL/L (ref 97–108)
CHOLEST SERPL-MCNC: 143 MG/DL
CO2 SERPL-SCNC: 33 MMOL/L (ref 21–32)
CREAT SERPL-MCNC: 1.17 MG/DL (ref 0.7–1.3)
CREAT UR-MCNC: 50.2 MG/DL
EST. AVERAGE GLUCOSE BLD GHB EST-MCNC: 134 MG/DL
GLOBULIN SER CALC-MCNC: 4 G/DL (ref 2–4)
GLUCOSE SERPL-MCNC: 120 MG/DL (ref 65–100)
HBA1C MFR BLD: 6.3 % (ref 4–5.6)
HDLC SERPL-MCNC: 45 MG/DL
HDLC SERPL: 3.2 (ref 0–5)
LDLC SERPL CALC-MCNC: 76.4 MG/DL (ref 0–100)
MICROALBUMIN UR-MCNC: 4.58 MG/DL
MICROALBUMIN/CREAT UR-RTO: 91 MG/G (ref 0–30)
POTASSIUM SERPL-SCNC: 3.8 MMOL/L (ref 3.5–5.1)
PROT SERPL-MCNC: 8 G/DL (ref 6.4–8.2)
SODIUM SERPL-SCNC: 137 MMOL/L (ref 136–145)
TRIGL SERPL-MCNC: 108 MG/DL (ref ?–150)
VLDLC SERPL CALC-MCNC: 21.6 MG/DL

## 2023-04-03 PROCEDURE — G0463 HOSPITAL OUTPT CLINIC VISIT: HCPCS | Performed by: PHYSICIAN ASSISTANT

## 2023-04-03 PROCEDURE — G8427 DOCREV CUR MEDS BY ELIG CLIN: HCPCS | Performed by: PHYSICIAN ASSISTANT

## 2023-04-03 PROCEDURE — 2022F DILAT RTA XM EVC RTNOPTHY: CPT | Performed by: PHYSICIAN ASSISTANT

## 2023-04-03 PROCEDURE — G8536 NO DOC ELDER MAL SCRN: HCPCS | Performed by: PHYSICIAN ASSISTANT

## 2023-04-03 PROCEDURE — 3017F COLORECTAL CA SCREEN DOC REV: CPT | Performed by: PHYSICIAN ASSISTANT

## 2023-04-03 PROCEDURE — 99214 OFFICE O/P EST MOD 30 MIN: CPT | Performed by: PHYSICIAN ASSISTANT

## 2023-04-03 PROCEDURE — G0439 PPPS, SUBSEQ VISIT: HCPCS | Performed by: PHYSICIAN ASSISTANT

## 2023-04-03 PROCEDURE — G8417 CALC BMI ABV UP PARAM F/U: HCPCS | Performed by: PHYSICIAN ASSISTANT

## 2023-04-03 PROCEDURE — 3046F HEMOGLOBIN A1C LEVEL >9.0%: CPT | Performed by: PHYSICIAN ASSISTANT

## 2023-04-03 PROCEDURE — 1101F PT FALLS ASSESS-DOCD LE1/YR: CPT | Performed by: PHYSICIAN ASSISTANT

## 2023-04-03 PROCEDURE — G8432 DEP SCR NOT DOC, RNG: HCPCS | Performed by: PHYSICIAN ASSISTANT

## 2023-04-03 RX ORDER — INSULIN PUMP SYRINGE, 3 ML
EACH MISCELLANEOUS
Qty: 1 KIT | Refills: 0 | Status: SHIPPED | OUTPATIENT
Start: 2023-04-03

## 2023-04-03 RX ORDER — IBUPROFEN 200 MG
CAPSULE ORAL
Qty: 100 STRIP | Refills: 3 | Status: SHIPPED | OUTPATIENT
Start: 2023-04-03

## 2023-04-03 RX ORDER — HYDRALAZINE HYDROCHLORIDE 100 MG/1
TABLET, FILM COATED ORAL 3 TIMES DAILY
COMMUNITY

## 2023-04-03 RX ORDER — LANCETS
EACH MISCELLANEOUS
Qty: 100 EACH | Refills: 3 | Status: SHIPPED | OUTPATIENT
Start: 2023-04-03

## 2023-04-03 NOTE — PROGRESS NOTES
Rbo Hoyos is a 69 y.o. male  Chief Complaint   Patient presents with    Annual Wellness Visit     Has a list of concerns - fasting     Visit Vitals  BP (!) 145/89 (BP 1 Location: Left upper arm, BP Patient Position: Sitting, BP Cuff Size: Adult)   Pulse 60   Temp 98.2 °F (36.8 °C) (Temporal)   Resp 14   Ht 6' 1\" (1.854 m)   Wt 279 lb 3.2 oz (126.6 kg)   SpO2 98%   BMI 36.84 kg/m²      Health Maintenance Due   Topic Date Due    Eye Exam Retinal or Dilated  Never done    DTaP/Tdap/Td series (1 - Tdap) Never done    COVID-19 Vaccine (3 - Booster for Pfizer series) 05/24/2021    Shingles Vaccine (2 of 2) 10/05/2021    Medicare Yearly Exam  10/19/2022       HPI  DM II - due for recheck   Lab Results   Component Value Date/Time    Hemoglobin A1c 6.4 (H) 11/30/2022 09:33 AM    Hemoglobin A1c 7.5 (H) 05/12/2022 07:41 AM    Hemoglobin A1c 6.4 (H) 12/15/2021 07:56 AM    Glucose 127 (H) 11/30/2022 09:33 AM    Microalbumin/Creat ratio (mg/g creat) 123 (H) 05/12/2022 07:41 AM    Microalbumin,urine random 12.00 05/12/2022 07:41 AM    LDL, calculated 65.4 11/30/2022 09:33 AM    Creatinine 1.17 11/30/2022 09:33 AM     Currently taking:   Key Antihyperglycemic Medications       Patient is on no antihyperglycemic meds. Weight gain. Planning to change diet/exercise to lose weight. Wt Readings from Last 3 Encounters:   04/03/23 279 lb 3.2 oz (126.6 kg)   01/30/23 272 lb 6.4 oz (123.6 kg)   09/18/22 278 lb (126.1 kg)     HTN Follow up - BP uncontrolled: Getting some similar readings at home. Planning to follow up with Cardiology in a few weeks. BP Readings from Last 3 Encounters:   04/03/23 (!) 145/89   01/30/23 130/79   09/18/22 (!) 139/91     Currently taking:   Key CAD CHF Meds               hydrALAZINE (APRESOLINE) 100 mg tablet (Taking) Take  by mouth three (3) times daily.     hydroCHLOROthiazide (HYDRODIURIL) 50 mg tablet (Taking) TAKE 1 TABLET BY MOUTH EVERY DAY    carvediloL (COREG) 25 mg tablet (Taking) TAKE 1 TABLET BY MOUTH TWICE A DAY    simvastatin (ZOCOR) 20 mg tablet (Taking) TAKE 1 TABLET BY MOUTH EVERY DAY    amLODIPine (NORVASC) 10 mg tablet (Taking) TAKE 1 TABLET BY MOUTH EVERY DAY    Eliquis 5 mg tablet (Taking) TAKE 1 TABLET BY MOUTH TWICE A DAY          Lab Results   Component Value Date/Time    Creatinine 1.17 11/30/2022 09:33 AM     Planning a cruise around [place]. Needs travel consultation. ROS  Review of Systems   Respiratory:  Negative for shortness of breath. Cardiovascular:  Negative for chest pain and palpitations. Neurological:  Negative for dizziness, loss of consciousness and weakness. EXAM  Physical Exam  Vitals and nursing note reviewed. Constitutional:       General: He is not in acute distress. Appearance: He is well-developed. HENT:      Head: Normocephalic and atraumatic. Neck:      Vascular: No JVD. Cardiovascular:      Rate and Rhythm: Normal rate and regular rhythm. Heart sounds: Normal heart sounds. Pulmonary:      Effort: Pulmonary effort is normal. No respiratory distress. Breath sounds: Normal breath sounds. Musculoskeletal:      Cervical back: Neck supple. Skin:     General: Skin is warm and dry. Neurological:      Mental Status: He is alert and oriented to person, place, and time. Psychiatric:         Mood and Affect: Mood normal.         Behavior: Behavior normal.         Thought Content: Thought content normal.         Judgment: Judgment normal.     ASSESSMENT/PLAN  Encounter Diagnoses     ICD-10-CM ICD-9-CM   1. Encounter for Medicare annual wellness exam  Z00.00 V70.0   2. Travel advice encounter  Z71.84 V65.49   3. Type 2 diabetes mellitus with microalbuminuria, without long-term current use of insulin ([facility])  E11.29 250.40    R80.9 791.0   4. Severe obesity ([facility] [address]  E66.01 278.01   5.  Essential hypertension  I10 401.9     Orders Placed This Encounter    METABOLIC PANEL, COMPREHENSIVE    HEMOGLOBIN A1C WITH EAG    LIPID PANEL MICROALBUMIN, UR, RAND W/ MICROALB/CREAT RATIO    REFERRAL TO TRAVEL CLINIC    hydrALAZINE (APRESOLINE) 100 mg tablet    Blood-Glucose Meter monitoring kit    lancets misc    glucose blood VI test strips (blood glucose test) strip   Glucometer training done by nurse today. Discussed diet/exercise and options for/importance of losing weight.
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
## 2023-04-04 NOTE — PROGRESS NOTES
Liver enzymes, kidney function, cholesterol, diabetes and electrolytes are all normal/acceptable. Urine protein level is improving. Keep up the good work!

## 2023-04-19 DIAGNOSIS — I48.0 PAROXYSMAL ATRIAL FIBRILLATION (HCC): ICD-10-CM

## 2023-04-19 RX ORDER — APIXABAN 5 MG/1
TABLET, FILM COATED ORAL
Qty: 180 TABLET | Refills: 3 | Status: SHIPPED | OUTPATIENT
Start: 2023-04-19

## 2023-05-17 ENCOUNTER — OFFICE VISIT (OUTPATIENT)
Age: 68
End: 2023-05-17
Payer: MEDICARE

## 2023-05-17 VITALS
BODY MASS INDEX: 36.71 KG/M2 | OXYGEN SATURATION: 99 % | WEIGHT: 277 LBS | RESPIRATION RATE: 14 BRPM | TEMPERATURE: 98.5 F | HEIGHT: 73 IN | SYSTOLIC BLOOD PRESSURE: 125 MMHG | DIASTOLIC BLOOD PRESSURE: 74 MMHG | HEART RATE: 56 BPM

## 2023-05-17 DIAGNOSIS — E11.29 TYPE 2 DIABETES MELLITUS WITH MICROALBUMINURIA, WITHOUT LONG-TERM CURRENT USE OF INSULIN (HCC): ICD-10-CM

## 2023-05-17 DIAGNOSIS — E66.01 SEVERE OBESITY (HCC): ICD-10-CM

## 2023-05-17 DIAGNOSIS — R80.9 TYPE 2 DIABETES MELLITUS WITH MICROALBUMINURIA, WITHOUT LONG-TERM CURRENT USE OF INSULIN (HCC): ICD-10-CM

## 2023-05-17 DIAGNOSIS — L98.9 SKIN LESION: ICD-10-CM

## 2023-05-17 DIAGNOSIS — I10 ESSENTIAL HYPERTENSION: ICD-10-CM

## 2023-05-17 DIAGNOSIS — I48.0 PAROXYSMAL ATRIAL FIBRILLATION (HCC): Primary | ICD-10-CM

## 2023-05-17 PROCEDURE — 99214 OFFICE O/P EST MOD 30 MIN: CPT | Performed by: PHYSICIAN ASSISTANT

## 2023-05-17 PROCEDURE — 3074F SYST BP LT 130 MM HG: CPT | Performed by: PHYSICIAN ASSISTANT

## 2023-05-17 PROCEDURE — 3044F HG A1C LEVEL LT 7.0%: CPT | Performed by: PHYSICIAN ASSISTANT

## 2023-05-17 PROCEDURE — G8417 CALC BMI ABV UP PARAM F/U: HCPCS | Performed by: PHYSICIAN ASSISTANT

## 2023-05-17 PROCEDURE — G8427 DOCREV CUR MEDS BY ELIG CLIN: HCPCS | Performed by: PHYSICIAN ASSISTANT

## 2023-05-17 PROCEDURE — 2022F DILAT RTA XM EVC RTNOPTHY: CPT | Performed by: PHYSICIAN ASSISTANT

## 2023-05-17 PROCEDURE — 4004F PT TOBACCO SCREEN RCVD TLK: CPT | Performed by: PHYSICIAN ASSISTANT

## 2023-05-17 PROCEDURE — 3078F DIAST BP <80 MM HG: CPT | Performed by: PHYSICIAN ASSISTANT

## 2023-05-17 PROCEDURE — 3017F COLORECTAL CA SCREEN DOC REV: CPT | Performed by: PHYSICIAN ASSISTANT

## 2023-05-17 PROCEDURE — 1123F ACP DISCUSS/DSCN MKR DOCD: CPT | Performed by: PHYSICIAN ASSISTANT

## 2023-05-17 RX ORDER — FLECAINIDE ACETATE 100 MG/1
TABLET ORAL
COMMUNITY
Start: 2023-04-26

## 2023-05-17 RX ORDER — CLOTRIMAZOLE AND BETAMETHASONE DIPROPIONATE 10; .64 MG/G; MG/G
CREAM TOPICAL
Qty: 45 G | Refills: 1 | Status: SHIPPED | OUTPATIENT
Start: 2023-05-17

## 2023-05-17 ASSESSMENT — ENCOUNTER SYMPTOMS
COUGH: 0
SHORTNESS OF BREATH: 0

## 2023-05-30 ENCOUNTER — OFFICE VISIT (OUTPATIENT)
Age: 68
End: 2023-05-30
Payer: MEDICARE

## 2023-05-30 VITALS
DIASTOLIC BLOOD PRESSURE: 70 MMHG | RESPIRATION RATE: 14 BRPM | HEIGHT: 73 IN | SYSTOLIC BLOOD PRESSURE: 113 MMHG | WEIGHT: 271.8 LBS | OXYGEN SATURATION: 97 % | HEART RATE: 65 BPM | TEMPERATURE: 98.3 F | BODY MASS INDEX: 36.02 KG/M2

## 2023-05-30 DIAGNOSIS — E66.01 SEVERE OBESITY (HCC): ICD-10-CM

## 2023-05-30 DIAGNOSIS — I10 ESSENTIAL HYPERTENSION: ICD-10-CM

## 2023-05-30 DIAGNOSIS — I48.0 PAROXYSMAL ATRIAL FIBRILLATION (HCC): ICD-10-CM

## 2023-05-30 DIAGNOSIS — L98.9 SKIN LESION: ICD-10-CM

## 2023-05-30 DIAGNOSIS — E11.29 TYPE 2 DIABETES MELLITUS WITH MICROALBUMINURIA, WITHOUT LONG-TERM CURRENT USE OF INSULIN (HCC): Primary | ICD-10-CM

## 2023-05-30 DIAGNOSIS — R80.9 TYPE 2 DIABETES MELLITUS WITH MICROALBUMINURIA, WITHOUT LONG-TERM CURRENT USE OF INSULIN (HCC): Primary | ICD-10-CM

## 2023-05-30 PROCEDURE — 99214 OFFICE O/P EST MOD 30 MIN: CPT | Performed by: PHYSICIAN ASSISTANT

## 2023-05-30 PROCEDURE — 3074F SYST BP LT 130 MM HG: CPT | Performed by: PHYSICIAN ASSISTANT

## 2023-05-30 PROCEDURE — 3078F DIAST BP <80 MM HG: CPT | Performed by: PHYSICIAN ASSISTANT

## 2023-05-30 PROCEDURE — 3044F HG A1C LEVEL LT 7.0%: CPT | Performed by: PHYSICIAN ASSISTANT

## 2023-05-30 PROCEDURE — 1036F TOBACCO NON-USER: CPT | Performed by: PHYSICIAN ASSISTANT

## 2023-05-30 PROCEDURE — 1123F ACP DISCUSS/DSCN MKR DOCD: CPT | Performed by: PHYSICIAN ASSISTANT

## 2023-05-30 PROCEDURE — G8417 CALC BMI ABV UP PARAM F/U: HCPCS | Performed by: PHYSICIAN ASSISTANT

## 2023-05-30 PROCEDURE — G8427 DOCREV CUR MEDS BY ELIG CLIN: HCPCS | Performed by: PHYSICIAN ASSISTANT

## 2023-05-30 PROCEDURE — 3017F COLORECTAL CA SCREEN DOC REV: CPT | Performed by: PHYSICIAN ASSISTANT

## 2023-05-30 PROCEDURE — 2022F DILAT RTA XM EVC RTNOPTHY: CPT | Performed by: PHYSICIAN ASSISTANT

## 2023-05-30 RX ORDER — SPIRONOLACTONE 25 MG/1
25 TABLET ORAL DAILY
COMMUNITY

## 2023-05-30 RX ORDER — SIMVASTATIN 20 MG
20 TABLET ORAL DAILY
Qty: 90 TABLET | Refills: 1 | Status: SHIPPED | OUTPATIENT
Start: 2023-05-30

## 2023-05-30 SDOH — ECONOMIC STABILITY: FOOD INSECURITY: WITHIN THE PAST 12 MONTHS, YOU WORRIED THAT YOUR FOOD WOULD RUN OUT BEFORE YOU GOT MONEY TO BUY MORE.: PATIENT DECLINED

## 2023-05-30 SDOH — ECONOMIC STABILITY: INCOME INSECURITY: HOW HARD IS IT FOR YOU TO PAY FOR THE VERY BASICS LIKE FOOD, HOUSING, MEDICAL CARE, AND HEATING?: PATIENT DECLINED

## 2023-05-30 SDOH — ECONOMIC STABILITY: HOUSING INSECURITY
IN THE LAST 12 MONTHS, WAS THERE A TIME WHEN YOU DID NOT HAVE A STEADY PLACE TO SLEEP OR SLEPT IN A SHELTER (INCLUDING NOW)?: PATIENT REFUSED

## 2023-05-30 SDOH — ECONOMIC STABILITY: FOOD INSECURITY: WITHIN THE PAST 12 MONTHS, THE FOOD YOU BOUGHT JUST DIDN'T LAST AND YOU DIDN'T HAVE MONEY TO GET MORE.: PATIENT DECLINED

## 2023-05-30 ASSESSMENT — ENCOUNTER SYMPTOMS
SHORTNESS OF BREATH: 0
COUGH: 0

## 2023-05-30 NOTE — PROGRESS NOTES
Garrett West is a 77 y.o. male  Chief Complaint   Patient presents with    Medication Check     Taking spironolactone and pt does not like it      Vitals:    05/30/23 0900   BP: 113/70   Pulse: 65   Resp: 14   Temp: 98.3 °F (36.8 °C)   SpO2: 97%      Pulse Readings from Last 9 Encounters:   05/30/23 65   05/17/23 56   04/03/23 60   01/30/23 77   09/14/22 59   06/14/22 66   02/11/22 62   12/06/21 74   10/21/21 61     Wt Readings from Last 3 Encounters:   05/30/23 271 lb 12.8 oz (123.3 kg)   05/17/23 277 lb (125.6 kg)   04/03/23 279 lb 3.2 oz (126.6 kg)     BMI Readings from Last 3 Encounters:   05/30/23 35.86 kg/m²   05/17/23 36.55 kg/m²   04/03/23 36.84 kg/m²     Health Maintenance Due   Topic Date Due    Diabetic retinal exam  Never done    Hepatitis C screen  Never done    DTaP/Tdap/Td vaccine (1 - Tdap) Never done    Shingles vaccine (2 of 2) 10/05/2021    COVID-19 Vaccine (4 - Booster for [employer] series) 12/27/2021    Diabetic foot exam  06/14/2023     HPI  Seen 2 weeks ago for an unusual skin lesion on abdomen. Pt reports that the Lotrisone cream is helping. Has a Derm appointment for August if the lesion hasn't entirely resolved by then. Hx A Fib, HTN, DM II - seeing Cardiology regularly. Recently started on Spironolactone and Potassium stopped. Notes that he is feeling very tired. Following up with Cardiology with labs next month. ROS  Review of Systems   Constitutional:  Positive for fatigue. Negative for fever. Respiratory:  Negative for cough and shortness of breath. Cardiovascular:  Negative for chest pain and palpitations. Neurological:  Negative for dizziness, syncope and headaches. Psychiatric/Behavioral:  Negative for dysphoric mood. EXAM  Physical Exam  Vitals and nursing note reviewed. Constitutional:       Appearance: Normal appearance. HENT:      Head: Normocephalic and atraumatic. Neck:      Vascular: No carotid bruit.    Cardiovascular:      Rate and Rhythm: Normal

## 2023-05-31 DIAGNOSIS — E87.6 HYPOKALEMIA: ICD-10-CM

## 2023-05-31 DIAGNOSIS — I10 ESSENTIAL (PRIMARY) HYPERTENSION: ICD-10-CM

## 2023-05-31 RX ORDER — POTASSIUM CHLORIDE 750 MG/1
TABLET, EXTENDED RELEASE ORAL
Qty: 90 TABLET | Refills: 1 | Status: SHIPPED | OUTPATIENT
Start: 2023-05-31

## 2023-05-31 RX ORDER — CARVEDILOL 25 MG/1
TABLET ORAL
Qty: 180 TABLET | Refills: 1 | Status: SHIPPED | OUTPATIENT
Start: 2023-05-31

## 2023-07-07 DIAGNOSIS — I10 ESSENTIAL (PRIMARY) HYPERTENSION: ICD-10-CM

## 2023-07-07 RX ORDER — HYDROCHLOROTHIAZIDE 50 MG/1
TABLET ORAL
Qty: 90 TABLET | Refills: 1 | Status: SHIPPED | OUTPATIENT
Start: 2023-07-07

## 2023-07-21 ENCOUNTER — HOSPITAL ENCOUNTER (OUTPATIENT)
Facility: HOSPITAL | Age: 68
Discharge: HOME | End: 2023-07-24

## 2023-07-31 ENCOUNTER — OFFICE VISIT (OUTPATIENT)
Age: 68
End: 2023-07-31
Payer: MEDICARE

## 2023-07-31 VITALS
BODY MASS INDEX: 35.97 KG/M2 | RESPIRATION RATE: 14 BRPM | HEIGHT: 73 IN | DIASTOLIC BLOOD PRESSURE: 73 MMHG | TEMPERATURE: 98.6 F | OXYGEN SATURATION: 93 % | SYSTOLIC BLOOD PRESSURE: 117 MMHG | HEART RATE: 70 BPM | WEIGHT: 271.4 LBS

## 2023-07-31 DIAGNOSIS — E11.29 TYPE 2 DIABETES MELLITUS WITH MICROALBUMINURIA, WITHOUT LONG-TERM CURRENT USE OF INSULIN (HCC): Primary | ICD-10-CM

## 2023-07-31 DIAGNOSIS — E87.6 HYPOKALEMIA: ICD-10-CM

## 2023-07-31 DIAGNOSIS — E11.29 TYPE 2 DIABETES MELLITUS WITH MICROALBUMINURIA, WITHOUT LONG-TERM CURRENT USE OF INSULIN (HCC): ICD-10-CM

## 2023-07-31 DIAGNOSIS — R80.9 TYPE 2 DIABETES MELLITUS WITH MICROALBUMINURIA, WITHOUT LONG-TERM CURRENT USE OF INSULIN (HCC): ICD-10-CM

## 2023-07-31 DIAGNOSIS — I10 ESSENTIAL (PRIMARY) HYPERTENSION: ICD-10-CM

## 2023-07-31 DIAGNOSIS — R80.9 TYPE 2 DIABETES MELLITUS WITH MICROALBUMINURIA, WITHOUT LONG-TERM CURRENT USE OF INSULIN (HCC): Primary | ICD-10-CM

## 2023-07-31 DIAGNOSIS — C61 PROSTATE CANCER (HCC): ICD-10-CM

## 2023-07-31 DIAGNOSIS — I10 ESSENTIAL HYPERTENSION: ICD-10-CM

## 2023-07-31 PROCEDURE — 99214 OFFICE O/P EST MOD 30 MIN: CPT | Performed by: PHYSICIAN ASSISTANT

## 2023-07-31 PROCEDURE — 2022F DILAT RTA XM EVC RTNOPTHY: CPT | Performed by: PHYSICIAN ASSISTANT

## 2023-07-31 PROCEDURE — 1036F TOBACCO NON-USER: CPT | Performed by: PHYSICIAN ASSISTANT

## 2023-07-31 PROCEDURE — 3017F COLORECTAL CA SCREEN DOC REV: CPT | Performed by: PHYSICIAN ASSISTANT

## 2023-07-31 PROCEDURE — 1123F ACP DISCUSS/DSCN MKR DOCD: CPT | Performed by: PHYSICIAN ASSISTANT

## 2023-07-31 PROCEDURE — 3074F SYST BP LT 130 MM HG: CPT | Performed by: PHYSICIAN ASSISTANT

## 2023-07-31 PROCEDURE — 3044F HG A1C LEVEL LT 7.0%: CPT | Performed by: PHYSICIAN ASSISTANT

## 2023-07-31 PROCEDURE — 3078F DIAST BP <80 MM HG: CPT | Performed by: PHYSICIAN ASSISTANT

## 2023-07-31 PROCEDURE — G8427 DOCREV CUR MEDS BY ELIG CLIN: HCPCS | Performed by: PHYSICIAN ASSISTANT

## 2023-07-31 PROCEDURE — G8417 CALC BMI ABV UP PARAM F/U: HCPCS | Performed by: PHYSICIAN ASSISTANT

## 2023-07-31 RX ORDER — GABAPENTIN 400 MG/1
CAPSULE ORAL
COMMUNITY
Start: 2023-07-28

## 2023-07-31 RX ORDER — HYDRALAZINE HYDROCHLORIDE 100 MG/1
TABLET, FILM COATED ORAL
COMMUNITY
Start: 2023-05-31

## 2023-07-31 ASSESSMENT — ENCOUNTER SYMPTOMS
SHORTNESS OF BREATH: 0
COUGH: 0

## 2023-08-26 LAB
ALBUMIN SERPL-MCNC: 4.4 G/DL (ref 3.9–4.9)
ALBUMIN/GLOB SERPL: 1.5 {RATIO} (ref 1.2–2.2)
ALP SERPL-CCNC: 88 IU/L (ref 44–121)
ALT SERPL-CCNC: 33 IU/L (ref 0–44)
AST SERPL-CCNC: 23 IU/L (ref 0–40)
BILIRUB SERPL-MCNC: 1 MG/DL (ref 0–1.2)
BUN SERPL-MCNC: 24 MG/DL (ref 8–27)
BUN/CREAT SERPL: 17 (ref 10–24)
CALCIUM SERPL-MCNC: 10.3 MG/DL (ref 8.6–10.2)
CHLORIDE SERPL-SCNC: 97 MMOL/L (ref 96–106)
CHOLEST SERPL-MCNC: 178 MG/DL (ref 100–199)
CO2 SERPL-SCNC: 22 MMOL/L (ref 20–29)
CREAT SERPL-MCNC: 1.43 MG/DL (ref 0.76–1.27)
EGFRCR SERPLBLD CKD-EPI 2021: 53 ML/MIN/1.73
GLOBULIN SER CALC-MCNC: 2.9 G/DL (ref 1.5–4.5)
GLUCOSE SERPL-MCNC: 175 MG/DL (ref 70–99)
HBA1C MFR BLD: 6.7 % (ref 4.8–5.6)
HDLC SERPL-MCNC: 35 MG/DL
IMP & REVIEW OF LAB RESULTS: NORMAL
LDLC SERPL CALC-MCNC: 117 MG/DL (ref 0–99)
POTASSIUM SERPL-SCNC: 4 MMOL/L (ref 3.5–5.2)
PROT SERPL-MCNC: 7.3 G/DL (ref 6–8.5)
REPORT: NORMAL
SODIUM SERPL-SCNC: 135 MMOL/L (ref 134–144)
TRIGL SERPL-MCNC: 144 MG/DL (ref 0–149)
VLDLC SERPL CALC-MCNC: 26 MG/DL (ref 5–40)

## 2023-08-29 ENCOUNTER — OFFICE VISIT (OUTPATIENT)
Age: 68
End: 2023-08-29
Payer: MEDICARE

## 2023-08-29 VITALS
SYSTOLIC BLOOD PRESSURE: 118 MMHG | TEMPERATURE: 98 F | HEART RATE: 83 BPM | RESPIRATION RATE: 19 BRPM | OXYGEN SATURATION: 98 % | HEIGHT: 73 IN | WEIGHT: 265.6 LBS | BODY MASS INDEX: 35.2 KG/M2 | DIASTOLIC BLOOD PRESSURE: 84 MMHG

## 2023-08-29 DIAGNOSIS — I10 ESSENTIAL HYPERTENSION: ICD-10-CM

## 2023-08-29 DIAGNOSIS — R80.9 TYPE 2 DIABETES MELLITUS WITH MICROALBUMINURIA, WITHOUT LONG-TERM CURRENT USE OF INSULIN (HCC): ICD-10-CM

## 2023-08-29 DIAGNOSIS — C61 PROSTATE CANCER (HCC): ICD-10-CM

## 2023-08-29 DIAGNOSIS — I48.0 PAROXYSMAL ATRIAL FIBRILLATION (HCC): ICD-10-CM

## 2023-08-29 DIAGNOSIS — E11.29 TYPE 2 DIABETES MELLITUS WITH MICROALBUMINURIA, WITHOUT LONG-TERM CURRENT USE OF INSULIN (HCC): ICD-10-CM

## 2023-08-29 DIAGNOSIS — E66.01 SEVERE OBESITY (HCC): Primary | ICD-10-CM

## 2023-08-29 DIAGNOSIS — E78.00 HYPERCHOLESTEREMIA: ICD-10-CM

## 2023-08-29 DIAGNOSIS — E83.52 SERUM CALCIUM ELEVATED: ICD-10-CM

## 2023-08-29 PROCEDURE — G8427 DOCREV CUR MEDS BY ELIG CLIN: HCPCS | Performed by: PHYSICIAN ASSISTANT

## 2023-08-29 PROCEDURE — 3079F DIAST BP 80-89 MM HG: CPT | Performed by: PHYSICIAN ASSISTANT

## 2023-08-29 PROCEDURE — 99214 OFFICE O/P EST MOD 30 MIN: CPT | Performed by: PHYSICIAN ASSISTANT

## 2023-08-29 PROCEDURE — 3017F COLORECTAL CA SCREEN DOC REV: CPT | Performed by: PHYSICIAN ASSISTANT

## 2023-08-29 PROCEDURE — 3044F HG A1C LEVEL LT 7.0%: CPT | Performed by: PHYSICIAN ASSISTANT

## 2023-08-29 PROCEDURE — 1036F TOBACCO NON-USER: CPT | Performed by: PHYSICIAN ASSISTANT

## 2023-08-29 PROCEDURE — 2022F DILAT RTA XM EVC RTNOPTHY: CPT | Performed by: PHYSICIAN ASSISTANT

## 2023-08-29 PROCEDURE — G8417 CALC BMI ABV UP PARAM F/U: HCPCS | Performed by: PHYSICIAN ASSISTANT

## 2023-08-29 PROCEDURE — 1123F ACP DISCUSS/DSCN MKR DOCD: CPT | Performed by: PHYSICIAN ASSISTANT

## 2023-08-29 PROCEDURE — 3074F SYST BP LT 130 MM HG: CPT | Performed by: PHYSICIAN ASSISTANT

## 2023-08-29 RX ORDER — ROSUVASTATIN CALCIUM 20 MG/1
20 TABLET, COATED ORAL DAILY
Qty: 90 TABLET | Refills: 1 | Status: SHIPPED | OUTPATIENT
Start: 2023-08-29

## 2023-08-29 ASSESSMENT — ENCOUNTER SYMPTOMS
COUGH: 0
SHORTNESS OF BREATH: 0

## 2023-08-29 NOTE — PROGRESS NOTES
I have reviewed all needed documentation in preparation for visit. Verified patient by name and date of birth  Chief Complaint   Patient presents with    Other     No concerns-        Vitals:    08/29/23 1310   Resp: 19   SpO2: 98%   Height: 6' 1\" (1.854 m)       Health Maintenance Due   Topic Date Due    Diabetic retinal exam  Never done    DTaP/Tdap/Td vaccine (1 - Tdap) Never done    Shingles vaccine (2 of 2) 10/05/2021    COVID-19 Vaccine (4 - Booster for Pfizer series) 12/27/2021    Prostate Specific Antigen (PSA) Screening or Monitoring  01/05/2022    Diabetic foot exam  06/14/2023    Flu vaccine (1) Never done       1. \"Have you been to the ER, urgent care clinic since your last visit? Hospitalized since your last visit? \" No    2. \"Have you seen or consulted any other health care providers outside of the [address] [place] [address] since your last visit? \" No     3. For patients aged 43-73: Has the patient had a colonoscopy / FIT/ Cologuard? Yes - no Care Gap present      If the patient is female:    4. For patients aged 43-66: Has the patient had a mammogram within the past 2 years? NA - based on age or sex      11. For patients aged 21-65: Has the patient had a pap smear?  NA - based on age or sex        Erika Little, [address]

## 2023-08-30 LAB
CALCIUM SERPL-MCNC: 10 MG/DL (ref 8.5–10.1)
CALCIUM SERPL-MCNC: 10.2 MG/DL (ref 8.5–10.1)
PHOSPHATE SERPL-MCNC: 2.9 MG/DL (ref 2.6–4.7)
PTH-INTACT SERPL-MCNC: 89.6 PG/ML (ref 18.4–88)

## 2023-08-31 LAB — TSH SERPL DL<=0.05 MIU/L-ACNC: 1.92 UIU/ML (ref 0.45–4.5)

## 2023-09-07 VITALS
TEMPERATURE: 98.3 F | SYSTOLIC BLOOD PRESSURE: 151 MMHG | WEIGHT: 265 LBS | DIASTOLIC BLOOD PRESSURE: 77 MMHG | BODY MASS INDEX: 34.96 KG/M2 | HEART RATE: 87 BPM

## 2023-09-07 LAB
COMMENT:: NORMAL
SPECIMEN HOLD: NORMAL

## 2023-09-07 PROCEDURE — 80053 COMPREHEN METABOLIC PANEL: CPT

## 2023-09-07 PROCEDURE — 93005 ELECTROCARDIOGRAM TRACING: CPT | Performed by: EMERGENCY MEDICINE

## 2023-09-07 PROCEDURE — 99284 EMERGENCY DEPT VISIT MOD MDM: CPT

## 2023-09-07 PROCEDURE — 36415 COLL VENOUS BLD VENIPUNCTURE: CPT

## 2023-09-07 PROCEDURE — 84484 ASSAY OF TROPONIN QUANT: CPT

## 2023-09-07 PROCEDURE — 85025 COMPLETE CBC W/AUTO DIFF WBC: CPT

## 2023-09-07 ASSESSMENT — PAIN SCALES - GENERAL: PAINLEVEL_OUTOF10: 3

## 2023-09-07 ASSESSMENT — PAIN DESCRIPTION - LOCATION: LOCATION: CHEST

## 2023-09-07 ASSESSMENT — PAIN - FUNCTIONAL ASSESSMENT: PAIN_FUNCTIONAL_ASSESSMENT: 0-10

## 2023-09-08 ENCOUNTER — HOSPITAL ENCOUNTER (EMERGENCY)
Facility: HOSPITAL | Age: 68
Discharge: HOME | End: 2023-09-08
Attending: EMERGENCY MEDICINE
Payer: MEDICARE

## 2023-09-08 DIAGNOSIS — R42 LIGHTHEADEDNESS: ICD-10-CM

## 2023-09-08 DIAGNOSIS — T41.45XA ADVERSE EFFECT OF ANESTHETIC, INITIAL ENCOUNTER: Primary | ICD-10-CM

## 2023-09-08 DIAGNOSIS — R51.9 NONINTRACTABLE HEADACHE, UNSPECIFIED CHRONICITY PATTERN, UNSPECIFIED HEADACHE TYPE: ICD-10-CM

## 2023-09-08 LAB
ALBUMIN SERPL-MCNC: 3.5 G/DL (ref 3.5–5)
ALBUMIN/GLOB SERPL: 0.7 (ref 1.1–2.2)
ALP SERPL-CCNC: 83 U/L (ref 45–117)
ALT SERPL-CCNC: 39 U/L (ref 12–78)
ANION GAP SERPL CALC-SCNC: 7 MMOL/L (ref 5–15)
AST SERPL-CCNC: 20 U/L (ref 15–37)
BASOPHILS # BLD: 0 K/UL (ref 0–0.1)
BASOPHILS NFR BLD: 0 % (ref 0–1)
BILIRUB SERPL-MCNC: 0.7 MG/DL (ref 0.2–1)
BUN SERPL-MCNC: 27 MG/DL (ref 6–20)
BUN/CREAT SERPL: 15 (ref 12–20)
CALCIUM SERPL-MCNC: 9.7 MG/DL (ref 8.5–10.1)
CHLORIDE SERPL-SCNC: 101 MMOL/L (ref 97–108)
CO2 SERPL-SCNC: 25 MMOL/L (ref 21–32)
CREAT SERPL-MCNC: 1.84 MG/DL (ref 0.7–1.3)
DIFFERENTIAL METHOD BLD: ABNORMAL
EOSINOPHIL # BLD: 0 K/UL (ref 0–0.4)
EOSINOPHIL NFR BLD: 0 % (ref 0–7)
ERYTHROCYTE [DISTWIDTH] IN BLOOD BY AUTOMATED COUNT: 14.5 % (ref 11.5–14.5)
GLOBULIN SER CALC-MCNC: 4.7 G/DL (ref 2–4)
GLUCOSE SERPL-MCNC: 170 MG/DL (ref 65–100)
HCT VFR BLD AUTO: 51 % (ref 36.6–50.3)
HGB BLD-MCNC: 16.7 G/DL (ref 12.1–17)
IMM GRANULOCYTES # BLD AUTO: 0 K/UL (ref 0–0.04)
IMM GRANULOCYTES NFR BLD AUTO: 1 % (ref 0–0.5)
LYMPHOCYTES # BLD: 0.8 K/UL (ref 0.8–3.5)
LYMPHOCYTES NFR BLD: 12 % (ref 12–49)
MCH RBC QN AUTO: 28.7 PG (ref 26–34)
MCHC RBC AUTO-ENTMCNC: 32.7 G/DL (ref 30–36.5)
MCV RBC AUTO: 87.8 FL (ref 80–99)
MONOCYTES # BLD: 0.3 K/UL (ref 0–1)
MONOCYTES NFR BLD: 4 % (ref 5–13)
NEUTS SEG # BLD: 6 K/UL (ref 1.8–8)
NEUTS SEG NFR BLD: 83 % (ref 32–75)
NRBC # BLD: 0 K/UL (ref 0–0.01)
NRBC BLD-RTO: 0 PER 100 WBC
PLATELET # BLD AUTO: 217 K/UL (ref 150–400)
PMV BLD AUTO: 10.3 FL (ref 8.9–12.9)
POTASSIUM SERPL-SCNC: 4.3 MMOL/L (ref 3.5–5.1)
PROT SERPL-MCNC: 8.2 G/DL (ref 6.4–8.2)
RBC # BLD AUTO: 5.81 M/UL (ref 4.1–5.7)
SODIUM SERPL-SCNC: 133 MMOL/L (ref 136–145)
TROPONIN I SERPL HS-MCNC: 5 NG/L (ref 0–76)
WBC # BLD AUTO: 7.1 K/UL (ref 4.1–11.1)

## 2023-09-08 RX ORDER — ACETAMINOPHEN 500 MG
1000 TABLET ORAL
Status: DISCONTINUED | OUTPATIENT
Start: 2023-09-08 | End: 2023-09-08

## 2023-09-08 NOTE — ED TRIAGE NOTES
Patient arrives from home for dizziness and nausea that began earlier today after having some radiation seeds placed for prostate cancer. Patient reports he received fentanyl during the procedure.

## 2023-09-08 NOTE — ED NOTES
Patient was discharged by provider before primary nurse could provide discharge instructions and education      Juliana Kasper RN  09/08/23 7031

## 2023-09-08 NOTE — ED NOTES
11:33 PM  I have evaluated the patient as the Provider in Rapid Medical Evaluation (RME). I have reviewed his vital signs and the triage nurse assessment. I have talked with the patient and any available family and advised that I am the provider in triage and have ordered the appropriate study to initiate their work up based on the clinical presentation during my assessment. I have advised that the patient will be accommodated in the Main ED as soon as possible. I have also requested to contact the triage nurse or myself immediately if the patient experiences any changes in their condition during this brief waiting period. Patient reports that he had radiation seeds placed earlier today at [facility] for prostate cancer and received fentanyl with the procedure. He reports since then he has felt SOB, lightheaded, nauseous. Denies any associated CP, fevers, chills.      [address]       [place], [place]  09/07/23 7276

## 2023-09-09 LAB
EKG ATRIAL RATE: 82 BPM
EKG DIAGNOSIS: NORMAL
EKG P AXIS: 36 DEGREES
EKG P-R INTERVAL: 184 MS
EKG Q-T INTERVAL: 380 MS
EKG QRS DURATION: 98 MS
EKG QTC CALCULATION (BAZETT): 443 MS
EKG R AXIS: 20 DEGREES
EKG T AXIS: 14 DEGREES
EKG VENTRICULAR RATE: 82 BPM

## 2023-09-09 PROCEDURE — 93010 ELECTROCARDIOGRAM REPORT: CPT | Performed by: SPECIALIST
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
## 2023-09-13 ENCOUNTER — HOSPITAL ENCOUNTER (OUTPATIENT)
Age: 68
Discharge: HOME | End: 2023-09-16
Payer: MEDICARE

## 2023-09-13 ENCOUNTER — HOSPITAL ENCOUNTER (OUTPATIENT)
Facility: HOSPITAL | Age: 68
Discharge: HOME | End: 2023-09-16
Attending: RADIOLOGY

## 2023-09-13 DIAGNOSIS — C61 PROSTATE CA (HCC): ICD-10-CM

## 2023-09-13 PROCEDURE — 76498 UNLISTED MR PROCEDURE: CPT

## 2023-10-03 ENCOUNTER — HOSPITAL ENCOUNTER (OUTPATIENT)
Facility: HOSPITAL | Age: 68
Discharge: HOME | End: 2023-10-06
Payer: MEDICARE

## 2023-10-03 DIAGNOSIS — M54.16 RADICULOPATHY, LUMBAR REGION: ICD-10-CM

## 2023-10-03 PROCEDURE — 72148 MRI LUMBAR SPINE W/O DYE: CPT

## 2023-10-10 ENCOUNTER — HOSPITAL ENCOUNTER (OUTPATIENT)
Facility: HOSPITAL | Age: 68
Discharge: HOME | End: 2023-10-13
Attending: RADIOLOGY

## 2023-10-10 LAB
RAD ONC ARIA COURSE FIRST TREATMENT DATE: NORMAL
RAD ONC ARIA COURSE ID: NORMAL
RAD ONC ARIA COURSE INTENT: NORMAL
RAD ONC ARIA COURSE LAST TREATMENT DATE: NORMAL
RAD ONC ARIA COURSE SESSION NUMBER: 1
RAD ONC ARIA COURSE START DATE: NORMAL
RAD ONC ARIA COURSE TREATMENT ELAPSED DAYS: 0
RAD ONC ARIA PLAN FRACTIONS TREATED TO DATE: 1
RAD ONC ARIA PLAN ID: NORMAL
RAD ONC ARIA PLAN PRESCRIBED DOSE PER FRACTION: 8 GY
RAD ONC ARIA PLAN PRIMARY REFERENCE POINT: NORMAL
RAD ONC ARIA PLAN TOTAL FRACTIONS PRESCRIBED: 5
RAD ONC ARIA PLAN TOTAL PRESCRIBED DOSE: 4000 CGY
RAD ONC ARIA REFERENCE POINT DOSAGE GIVEN TO DATE: 7.25 GY
RAD ONC ARIA REFERENCE POINT DOSAGE GIVEN TO DATE: 8 GY
RAD ONC ARIA REFERENCE POINT DOSAGE GIVEN TO DATE: 8.08 GY
RAD ONC ARIA REFERENCE POINT ID: NORMAL
RAD ONC ARIA REFERENCE POINT SESSION DOSAGE GIVEN: 7.25 GY
RAD ONC ARIA REFERENCE POINT SESSION DOSAGE GIVEN: 8 GY
RAD ONC ARIA REFERENCE POINT SESSION DOSAGE GIVEN: 8.08 GY

## 2023-10-12 ENCOUNTER — HOSPITAL ENCOUNTER (OUTPATIENT)
Facility: HOSPITAL | Age: 68
Discharge: HOME | End: 2023-10-15
Attending: RADIOLOGY

## 2023-10-12 LAB
RAD ONC ARIA COURSE FIRST TREATMENT DATE: NORMAL
RAD ONC ARIA COURSE ID: NORMAL
RAD ONC ARIA COURSE INTENT: NORMAL
RAD ONC ARIA COURSE LAST TREATMENT DATE: NORMAL
RAD ONC ARIA COURSE SESSION NUMBER: 2
RAD ONC ARIA COURSE START DATE: NORMAL
RAD ONC ARIA COURSE TREATMENT ELAPSED DAYS: 2
RAD ONC ARIA PLAN FRACTIONS TREATED TO DATE: 2
RAD ONC ARIA PLAN ID: NORMAL
RAD ONC ARIA PLAN PRESCRIBED DOSE PER FRACTION: 8 GY
RAD ONC ARIA PLAN PRIMARY REFERENCE POINT: NORMAL
RAD ONC ARIA PLAN TOTAL FRACTIONS PRESCRIBED: 5
RAD ONC ARIA PLAN TOTAL PRESCRIBED DOSE: 4000 CGY
RAD ONC ARIA REFERENCE POINT DOSAGE GIVEN TO DATE: 14.5 GY
RAD ONC ARIA REFERENCE POINT DOSAGE GIVEN TO DATE: 16 GY
RAD ONC ARIA REFERENCE POINT DOSAGE GIVEN TO DATE: 16.17 GY
RAD ONC ARIA REFERENCE POINT ID: NORMAL
RAD ONC ARIA REFERENCE POINT SESSION DOSAGE GIVEN: 7.25 GY
RAD ONC ARIA REFERENCE POINT SESSION DOSAGE GIVEN: 8 GY
RAD ONC ARIA REFERENCE POINT SESSION DOSAGE GIVEN: 8.08 GY

## 2023-10-16 ENCOUNTER — HOSPITAL ENCOUNTER (OUTPATIENT)
Facility: HOSPITAL | Age: 68
Discharge: HOME | End: 2023-10-19
Attending: RADIOLOGY

## 2023-10-16 LAB
RAD ONC ARIA COURSE FIRST TREATMENT DATE: NORMAL
RAD ONC ARIA COURSE ID: NORMAL
RAD ONC ARIA COURSE INTENT: NORMAL
RAD ONC ARIA COURSE LAST TREATMENT DATE: NORMAL
RAD ONC ARIA COURSE SESSION NUMBER: 3
RAD ONC ARIA COURSE START DATE: NORMAL
RAD ONC ARIA COURSE TREATMENT ELAPSED DAYS: 6
RAD ONC ARIA PLAN FRACTIONS TREATED TO DATE: 3
RAD ONC ARIA PLAN ID: NORMAL
RAD ONC ARIA PLAN PRESCRIBED DOSE PER FRACTION: 8 GY
RAD ONC ARIA PLAN PRIMARY REFERENCE POINT: NORMAL
RAD ONC ARIA PLAN TOTAL FRACTIONS PRESCRIBED: 5
RAD ONC ARIA PLAN TOTAL PRESCRIBED DOSE: 4000 CGY
RAD ONC ARIA REFERENCE POINT DOSAGE GIVEN TO DATE: 21.75 GY
RAD ONC ARIA REFERENCE POINT DOSAGE GIVEN TO DATE: 24 GY
RAD ONC ARIA REFERENCE POINT DOSAGE GIVEN TO DATE: 24.25 GY
RAD ONC ARIA REFERENCE POINT ID: NORMAL
RAD ONC ARIA REFERENCE POINT SESSION DOSAGE GIVEN: 7.25 GY
RAD ONC ARIA REFERENCE POINT SESSION DOSAGE GIVEN: 8 GY
RAD ONC ARIA REFERENCE POINT SESSION DOSAGE GIVEN: 8.08 GY

## 2023-10-18 ENCOUNTER — HOSPITAL ENCOUNTER (OUTPATIENT)
Facility: HOSPITAL | Age: 68
Discharge: HOME | End: 2023-10-21
Attending: RADIOLOGY

## 2023-10-18 LAB
RAD ONC ARIA COURSE FIRST TREATMENT DATE: NORMAL
RAD ONC ARIA COURSE ID: NORMAL
RAD ONC ARIA COURSE INTENT: NORMAL
RAD ONC ARIA COURSE LAST TREATMENT DATE: NORMAL
RAD ONC ARIA COURSE SESSION NUMBER: 4
RAD ONC ARIA COURSE START DATE: NORMAL
RAD ONC ARIA COURSE TREATMENT ELAPSED DAYS: 8
RAD ONC ARIA PLAN FRACTIONS TREATED TO DATE: 4
RAD ONC ARIA PLAN ID: NORMAL
RAD ONC ARIA PLAN PRESCRIBED DOSE PER FRACTION: 8 GY
RAD ONC ARIA PLAN PRIMARY REFERENCE POINT: NORMAL
RAD ONC ARIA PLAN TOTAL FRACTIONS PRESCRIBED: 5
RAD ONC ARIA PLAN TOTAL PRESCRIBED DOSE: 4000 CGY
RAD ONC ARIA REFERENCE POINT DOSAGE GIVEN TO DATE: 29 GY
RAD ONC ARIA REFERENCE POINT DOSAGE GIVEN TO DATE: 32 GY
RAD ONC ARIA REFERENCE POINT DOSAGE GIVEN TO DATE: 32.34 GY
RAD ONC ARIA REFERENCE POINT ID: NORMAL
RAD ONC ARIA REFERENCE POINT SESSION DOSAGE GIVEN: 7.25 GY
RAD ONC ARIA REFERENCE POINT SESSION DOSAGE GIVEN: 8 GY
RAD ONC ARIA REFERENCE POINT SESSION DOSAGE GIVEN: 8.08 GY

## 2023-10-20 ENCOUNTER — HOSPITAL ENCOUNTER (OUTPATIENT)
Facility: HOSPITAL | Age: 68
Discharge: HOME | End: 2023-10-23
Attending: RADIOLOGY

## 2023-10-20 LAB
RAD ONC ARIA COURSE FIRST TREATMENT DATE: NORMAL
RAD ONC ARIA COURSE ID: NORMAL
RAD ONC ARIA COURSE INTENT: NORMAL
RAD ONC ARIA COURSE LAST TREATMENT DATE: NORMAL
RAD ONC ARIA COURSE SESSION NUMBER: 5
RAD ONC ARIA COURSE START DATE: NORMAL
RAD ONC ARIA COURSE TREATMENT ELAPSED DAYS: 10
RAD ONC ARIA PLAN FRACTIONS TREATED TO DATE: 5
RAD ONC ARIA PLAN ID: NORMAL
RAD ONC ARIA PLAN PRESCRIBED DOSE PER FRACTION: 8 GY
RAD ONC ARIA PLAN PRIMARY REFERENCE POINT: NORMAL
RAD ONC ARIA PLAN TOTAL FRACTIONS PRESCRIBED: 5
RAD ONC ARIA PLAN TOTAL PRESCRIBED DOSE: 4000 CGY
RAD ONC ARIA REFERENCE POINT DOSAGE GIVEN TO DATE: 36.25 GY
RAD ONC ARIA REFERENCE POINT DOSAGE GIVEN TO DATE: 40 GY
RAD ONC ARIA REFERENCE POINT DOSAGE GIVEN TO DATE: 40.42 GY
RAD ONC ARIA REFERENCE POINT ID: NORMAL
RAD ONC ARIA REFERENCE POINT SESSION DOSAGE GIVEN: 7.25 GY
RAD ONC ARIA REFERENCE POINT SESSION DOSAGE GIVEN: 8 GY
RAD ONC ARIA REFERENCE POINT SESSION DOSAGE GIVEN: 8.08 GY

## 2023-10-26 RX ORDER — HYDROCORTISONE ACETATE 25 MG/1
25 SUPPOSITORY RECTAL EVERY 12 HOURS
Qty: 24 SUPPOSITORY | Refills: 5 | Status: SHIPPED | OUTPATIENT
Start: 2023-10-26

## 2023-11-23 DIAGNOSIS — E87.6 HYPOKALEMIA: ICD-10-CM

## 2023-11-24 RX ORDER — POTASSIUM CHLORIDE 750 MG/1
TABLET, EXTENDED RELEASE ORAL
Qty: 90 TABLET | Refills: 1 | Status: SHIPPED | OUTPATIENT
Start: 2023-11-24

## 2023-11-28 ENCOUNTER — TELEMEDICINE (OUTPATIENT)
Age: 68
End: 2023-11-28
Payer: MEDICARE

## 2023-11-28 VITALS — WEIGHT: 259 LBS | BODY MASS INDEX: 35.13 KG/M2 | HEART RATE: 81 BPM

## 2023-11-28 DIAGNOSIS — S69.91XS HAND TRAUMA, RIGHT, SEQUELA: Primary | ICD-10-CM

## 2023-11-28 DIAGNOSIS — E11.29 TYPE 2 DIABETES MELLITUS WITH MICROALBUMINURIA, WITHOUT LONG-TERM CURRENT USE OF INSULIN (HCC): ICD-10-CM

## 2023-11-28 DIAGNOSIS — R80.9 TYPE 2 DIABETES MELLITUS WITH MICROALBUMINURIA, WITHOUT LONG-TERM CURRENT USE OF INSULIN (HCC): ICD-10-CM

## 2023-11-28 DIAGNOSIS — E78.00 HYPERCHOLESTEREMIA: ICD-10-CM

## 2023-11-28 DIAGNOSIS — R79.89 ELEVATED PARATHYROID HORMONE: ICD-10-CM

## 2023-11-28 DIAGNOSIS — E83.52 SERUM CALCIUM ELEVATED: ICD-10-CM

## 2023-11-28 PROCEDURE — 3017F COLORECTAL CA SCREEN DOC REV: CPT | Performed by: PHYSICIAN ASSISTANT

## 2023-11-28 PROCEDURE — 3044F HG A1C LEVEL LT 7.0%: CPT | Performed by: PHYSICIAN ASSISTANT

## 2023-11-28 PROCEDURE — 1036F TOBACCO NON-USER: CPT | Performed by: PHYSICIAN ASSISTANT

## 2023-11-28 PROCEDURE — 2022F DILAT RTA XM EVC RTNOPTHY: CPT | Performed by: PHYSICIAN ASSISTANT

## 2023-11-28 PROCEDURE — G8427 DOCREV CUR MEDS BY ELIG CLIN: HCPCS | Performed by: PHYSICIAN ASSISTANT

## 2023-11-28 PROCEDURE — G8484 FLU IMMUNIZE NO ADMIN: HCPCS | Performed by: PHYSICIAN ASSISTANT

## 2023-11-28 PROCEDURE — 1123F ACP DISCUSS/DSCN MKR DOCD: CPT | Performed by: PHYSICIAN ASSISTANT

## 2023-11-28 PROCEDURE — 99214 OFFICE O/P EST MOD 30 MIN: CPT | Performed by: PHYSICIAN ASSISTANT

## 2023-11-28 PROCEDURE — G8417 CALC BMI ABV UP PARAM F/U: HCPCS | Performed by: PHYSICIAN ASSISTANT

## 2023-11-28 RX ORDER — SIMVASTATIN 20 MG
20 TABLET ORAL DAILY
Qty: 90 TABLET | Refills: 1 | Status: SHIPPED | OUTPATIENT
Start: 2023-11-28 | End: 2023-11-28

## 2023-11-28 RX ORDER — ROSUVASTATIN CALCIUM 20 MG/1
20 TABLET, COATED ORAL DAILY
Qty: 90 TABLET | Refills: 1 | Status: SHIPPED | OUTPATIENT
Start: 2023-11-28

## 2023-11-28 RX ORDER — IPRATROPIUM BROMIDE 21 UG/1
SPRAY, METERED NASAL
COMMUNITY
Start: 2023-11-13

## 2023-11-28 RX ORDER — GABAPENTIN 300 MG/1
CAPSULE ORAL
COMMUNITY
Start: 2023-11-13

## 2023-11-28 ASSESSMENT — ENCOUNTER SYMPTOMS
SHORTNESS OF BREATH: 0
COUGH: 0
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
## 2023-11-28 NOTE — PROGRESS NOTES
I have reviewed all needed documentation in preparation for visit. Verified patient by name and date of birth  No chief complaint on file. Vitals:    11/28/23 1036   Pulse: 81   Weight: 117.5 kg (259 lb)       Health Maintenance Due   Topic Date Due    Diabetic retinal exam  Never done    DTaP/Tdap/Td vaccine (1 - Tdap) Never done    Hepatitis B vaccine (1 of 3 - Risk 3-dose series) Never done    Shingles vaccine (2 of 2) 10/05/2021    Prostate Specific Antigen (PSA) Screening or Monitoring  01/05/2022    Pneumococcal 65+ years Vaccine (2 - PCV) 10/18/2022    Diabetic foot exam  06/14/2023    Flu vaccine (1) Never done       1. \"Have you been to the ER, urgent care clinic since your last visit? Hospitalized since your last visit? \" No    2. \"Have you seen or consulted any other health care providers outside of the [address] [place] [address] since your last visit? \" No    3. For patients aged 43-73: Has the patient had a colonoscopy / FIT/ Cologuard? Up to date      If the patient is female:    4. For patients aged 43-66: Has the patient had a mammogram within the past 2 years? NA      5. For patients aged 21-65: Has the patient had a pap smear?  NA        ELIZABETH Smith

## 2023-11-29 DIAGNOSIS — R80.9 TYPE 2 DIABETES MELLITUS WITH MICROALBUMINURIA, WITHOUT LONG-TERM CURRENT USE OF INSULIN (HCC): ICD-10-CM

## 2023-11-29 DIAGNOSIS — E11.29 TYPE 2 DIABETES MELLITUS WITH MICROALBUMINURIA, WITHOUT LONG-TERM CURRENT USE OF INSULIN (HCC): ICD-10-CM

## 2023-11-29 RX ORDER — SIMVASTATIN 20 MG
20 TABLET ORAL DAILY
Qty: 90 TABLET | Refills: 1 | OUTPATIENT
Start: 2023-11-29

## 2023-11-29 NOTE — TELEPHONE ENCOUNTER
Refill request received from [facility] for   Requested Prescriptions     Pending Prescriptions Disp Refills    simvastatin (ZOCOR) 20 MG tablet 90 tablet 1     Sig: Take 1 tablet by mouth daily     11/28/2023   Visit date not found     Routed to Tere Peralta[address] for review.

## 2023-12-05 ENCOUNTER — TELEPHONE (OUTPATIENT)
Age: 68
End: 2023-12-05

## 2023-12-05 NOTE — TELEPHONE ENCOUNTER
Pt's pharmacist recommends that we change Simvastatin to atorvastatin or rosuvastatin because they interact with Amlodipine better than Simvastatin.  Is this ok with pt?

## 2023-12-05 NOTE — TELEPHONE ENCOUNTER
Called patient and verified name and date of birth. Pt currently on crestor. Pt scheduled for thumb surg, scheduled for surg.

## 2023-12-06 ENCOUNTER — TELEPHONE (OUTPATIENT)
Age: 68
End: 2023-12-06

## 2023-12-06 DIAGNOSIS — E11.29 TYPE 2 DIABETES MELLITUS WITH MICROALBUMINURIA, WITHOUT LONG-TERM CURRENT USE OF INSULIN (HCC): Primary | ICD-10-CM

## 2023-12-06 DIAGNOSIS — R80.9 TYPE 2 DIABETES MELLITUS WITH MICROALBUMINURIA, WITHOUT LONG-TERM CURRENT USE OF INSULIN (HCC): Primary | ICD-10-CM

## 2023-12-30 DIAGNOSIS — I10 ESSENTIAL (PRIMARY) HYPERTENSION: ICD-10-CM

## 2024-01-02 RX ORDER — HYDROCHLOROTHIAZIDE 50 MG/1
TABLET ORAL
Qty: 90 TABLET | Refills: 1 | Status: SHIPPED | OUTPATIENT
Start: 2024-01-02

## 2024-01-09 ENCOUNTER — OFFICE VISIT (OUTPATIENT)
Age: 69
End: 2024-01-09
Payer: MEDICARE

## 2024-01-09 VITALS
SYSTOLIC BLOOD PRESSURE: 113 MMHG | WEIGHT: 258 LBS | DIASTOLIC BLOOD PRESSURE: 75 MMHG | BODY MASS INDEX: 34.19 KG/M2 | HEART RATE: 73 BPM | HEIGHT: 73 IN

## 2024-01-09 DIAGNOSIS — E83.52 HYPERCALCEMIA: ICD-10-CM

## 2024-01-09 DIAGNOSIS — R79.89 ELEVATED PARATHYROID HORMONE: Primary | ICD-10-CM

## 2024-01-09 PROCEDURE — 1123F ACP DISCUSS/DSCN MKR DOCD: CPT | Performed by: INTERNAL MEDICINE

## 2024-01-09 PROCEDURE — 3017F COLORECTAL CA SCREEN DOC REV: CPT | Performed by: INTERNAL MEDICINE

## 2024-01-09 PROCEDURE — 3078F DIAST BP <80 MM HG: CPT | Performed by: INTERNAL MEDICINE

## 2024-01-09 PROCEDURE — G8484 FLU IMMUNIZE NO ADMIN: HCPCS | Performed by: INTERNAL MEDICINE

## 2024-01-09 PROCEDURE — 1036F TOBACCO NON-USER: CPT | Performed by: INTERNAL MEDICINE

## 2024-01-09 PROCEDURE — G8428 CUR MEDS NOT DOCUMENT: HCPCS | Performed by: INTERNAL MEDICINE

## 2024-01-09 PROCEDURE — 99204 OFFICE O/P NEW MOD 45 MIN: CPT | Performed by: INTERNAL MEDICINE

## 2024-01-09 PROCEDURE — G8417 CALC BMI ABV UP PARAM F/U: HCPCS | Performed by: INTERNAL MEDICINE

## 2024-01-09 PROCEDURE — 3074F SYST BP LT 130 MM HG: CPT | Performed by: INTERNAL MEDICINE

## 2024-01-09 RX ORDER — SPIRONOLACTONE 25 MG/1
25 TABLET ORAL DAILY
COMMUNITY
Start: 2023-12-30

## 2024-01-09 NOTE — PROGRESS NOTES
Chief Complaint   Patient presents with    Thyroid Problem       General:  Says not here for DM, but here for thyroid  But looking at PCP notes, it is for high calcium      Meds:  OTC Ca: none  Vit D: none, never taken   HCTZ:  YES (25mg to 50mg last summer)  Lithium: No     Kidney Stones: none  Bone loss:  none    Symptoms:  Weakness: no  Bone pain: no  Confusion: no  Lethargy: no  Low concentration: no  GERD: no  Constipation: no  Low appetite no    Bone scan for prostate cancer (not BMD)    Family history:  Kidney stones - no  Bone loss - no  Fractures - no  Calcium issues - no    Labs/Studies    No results found for: \"CILT\", \"CAION\"  Lab Results   Component Value Date/Time    CALCIUM 9.7 09/07/2023 11:40 PM    CALCIUM 10.0 08/29/2023 02:02 PM    CALCIUM 10.2 08/29/2023 02:02 PM    CALCIUM 10.3 08/25/2023 08:26 AM    CALCIUM 9.9 04/03/2023 09:47 AM    CALCIUM 9.6 11/30/2022 09:33 AM    CALCIUM 9.6 09/18/2022 06:34 AM    CALCIUM 9.4 05/12/2022 07:41 AM    CALCIUM 9.3 12/15/2021 07:56 AM    CALCIUM 9.6 07/21/2021 07:32 AM    CALCIUM 9.9 01/05/2021 11:23 AM     Lab Results   Component Value Date/Time    IPTH 89.6 08/29/2023 02:02 PM     No results found for: \"VITD25\"  Lab Results   Component Value Date/Time    PHOS 2.9 08/29/2023 02:02 PM     No results found for: \"CALU2\", \"CREAU\", \"CALCIUMMUR\"    Lab Results   Component Value Date/Time    TSH 1.920 08/29/2023 02:02 PM    TSH 1.210 01/05/2021 11:23 AM       BMD  L Spine  L Hip Neck  L Hip Total  L Wrist  FRAX                        Past Medical History:   Diagnosis Date    Atrial fibrillation (HCC)     Hyperlipidemia     Hypertension         Blood pressure 113/75, pulse 73, height 1.854 m (6' 1\"), weight 117 kg (258 lb).         1/9/2024    10:15 AM 12/4/2023     4:08 PM 11/28/2023    10:36 AM 9/7/2023    11:28 PM 8/29/2023     1:10 PM 7/31/2023     1:45 PM 5/30/2023     9:00 AM   Weight Metrics   Weight 258 lb 260 lb 259 lb 265 lb 265 lb 9.6 oz 271 lb 6.4 oz 271 lb

## 2024-01-12 LAB
25(OH)D3+25(OH)D2 SERPL-MCNC: 11.1 NG/ML (ref 30–100)
BUN SERPL-MCNC: 15 MG/DL (ref 8–27)
BUN/CREAT SERPL: 12 (ref 10–24)
CALCIUM SERPL-MCNC: 9.9 MG/DL (ref 8.6–10.2)
CHLORIDE SERPL-SCNC: 100 MMOL/L (ref 96–106)
CO2 SERPL-SCNC: 23 MMOL/L (ref 20–29)
CREAT SERPL-MCNC: 1.29 MG/DL (ref 0.76–1.27)
CREAT UR-MCNC: 92.3 MG/DL
EGFRCR SERPLBLD CKD-EPI 2021: 60 ML/MIN/1.73
GLUCOSE SERPL-MCNC: 109 MG/DL (ref 70–99)
PHOSPHATE SERPL-MCNC: 2.3 MG/DL (ref 2.8–4.1)
POTASSIUM SERPL-SCNC: 4.2 MMOL/L (ref 3.5–5.2)
PTH-INTACT SERPL-MCNC: 29 PG/ML (ref 15–65)
SODIUM SERPL-SCNC: 137 MMOL/L (ref 134–144)

## 2024-01-14 LAB — CALCIUM 24H UR-MCNC: 1.2 MG/DL

## 2024-01-15 LAB — CA-I SERPL ISE-MCNC: 5.2 MG/DL (ref 4.5–5.6)

## 2024-01-30 LAB
ALBUMIN SERPL-MCNC: 4.3 G/DL (ref 3.9–4.9)
ALBUMIN/GLOB SERPL: 1.5 {RATIO} (ref 1.2–2.2)
ALP SERPL-CCNC: 87 IU/L (ref 44–121)
ALT SERPL-CCNC: 22 IU/L (ref 0–44)
AST SERPL-CCNC: 15 IU/L (ref 0–40)
BILIRUB SERPL-MCNC: 0.9 MG/DL (ref 0–1.2)
BUN SERPL-MCNC: 14 MG/DL (ref 8–27)
BUN/CREAT SERPL: 11 (ref 10–24)
CALCIUM SERPL-MCNC: 10.5 MG/DL (ref 8.6–10.2)
CHLORIDE SERPL-SCNC: 102 MMOL/L (ref 96–106)
CHOLEST SERPL-MCNC: 103 MG/DL (ref 100–199)
CO2 SERPL-SCNC: 22 MMOL/L (ref 20–29)
CREAT SERPL-MCNC: 1.26 MG/DL (ref 0.76–1.27)
EGFRCR SERPLBLD CKD-EPI 2021: 61 ML/MIN/1.73
GLOBULIN SER CALC-MCNC: 2.8 G/DL (ref 1.5–4.5)
GLUCOSE SERPL-MCNC: 121 MG/DL (ref 70–99)
HBA1C MFR BLD: 6.7 % (ref 4.8–5.6)
HDLC SERPL-MCNC: 37 MG/DL
LDLC SERPL CALC-MCNC: 47 MG/DL (ref 0–99)
POTASSIUM SERPL-SCNC: 4.5 MMOL/L (ref 3.5–5.2)
PROT SERPL-MCNC: 7.1 G/DL (ref 6–8.5)
SODIUM SERPL-SCNC: 139 MMOL/L (ref 134–144)
TRIGL SERPL-MCNC: 101 MG/DL (ref 0–149)
VLDLC SERPL CALC-MCNC: 19 MG/DL (ref 5–40)

## 2024-01-31 LAB
ALBUMIN/CREAT UR: 20 MG/G CREAT (ref 0–29)
CREAT UR-MCNC: 124.7 MG/DL
IMP & REVIEW OF LAB RESULTS: NORMAL
Lab: NORMAL
MICROALBUMIN UR-MCNC: 24.4 UG/ML

## 2024-02-01 DIAGNOSIS — R80.9 TYPE 2 DIABETES MELLITUS WITH MICROALBUMINURIA, WITHOUT LONG-TERM CURRENT USE OF INSULIN (HCC): ICD-10-CM

## 2024-02-01 DIAGNOSIS — E11.29 TYPE 2 DIABETES MELLITUS WITH MICROALBUMINURIA, WITHOUT LONG-TERM CURRENT USE OF INSULIN (HCC): ICD-10-CM

## 2024-02-03 DIAGNOSIS — I10 ESSENTIAL (PRIMARY) HYPERTENSION: ICD-10-CM

## 2024-02-05 RX ORDER — CARVEDILOL 25 MG/1
TABLET ORAL
Qty: 180 TABLET | Refills: 1 | Status: SHIPPED | OUTPATIENT
Start: 2024-02-05

## 2024-02-07 ENCOUNTER — OFFICE VISIT (OUTPATIENT)
Age: 69
End: 2024-02-07
Payer: MEDICARE

## 2024-02-07 VITALS
HEIGHT: 73 IN | WEIGHT: 262 LBS | RESPIRATION RATE: 19 BRPM | OXYGEN SATURATION: 97 % | DIASTOLIC BLOOD PRESSURE: 74 MMHG | BODY MASS INDEX: 34.72 KG/M2 | SYSTOLIC BLOOD PRESSURE: 115 MMHG | TEMPERATURE: 97.7 F | HEART RATE: 62 BPM

## 2024-02-07 DIAGNOSIS — I10 ESSENTIAL HYPERTENSION: ICD-10-CM

## 2024-02-07 DIAGNOSIS — C61 PROSTATE CANCER (HCC): ICD-10-CM

## 2024-02-07 DIAGNOSIS — E66.01 SEVERE OBESITY (HCC): ICD-10-CM

## 2024-02-07 DIAGNOSIS — R80.9 TYPE 2 DIABETES MELLITUS WITH MICROALBUMINURIA, WITHOUT LONG-TERM CURRENT USE OF INSULIN (HCC): Primary | ICD-10-CM

## 2024-02-07 DIAGNOSIS — E11.29 TYPE 2 DIABETES MELLITUS WITH MICROALBUMINURIA, WITHOUT LONG-TERM CURRENT USE OF INSULIN (HCC): ICD-10-CM

## 2024-02-07 DIAGNOSIS — E11.29 TYPE 2 DIABETES MELLITUS WITH MICROALBUMINURIA, WITHOUT LONG-TERM CURRENT USE OF INSULIN (HCC): Primary | ICD-10-CM

## 2024-02-07 DIAGNOSIS — I48.0 PAROXYSMAL ATRIAL FIBRILLATION (HCC): ICD-10-CM

## 2024-02-07 DIAGNOSIS — R80.9 TYPE 2 DIABETES MELLITUS WITH MICROALBUMINURIA, WITHOUT LONG-TERM CURRENT USE OF INSULIN (HCC): ICD-10-CM

## 2024-02-07 PROCEDURE — 3078F DIAST BP <80 MM HG: CPT | Performed by: PHYSICIAN ASSISTANT

## 2024-02-07 PROCEDURE — 3074F SYST BP LT 130 MM HG: CPT | Performed by: PHYSICIAN ASSISTANT

## 2024-02-07 PROCEDURE — 1036F TOBACCO NON-USER: CPT | Performed by: PHYSICIAN ASSISTANT

## 2024-02-07 PROCEDURE — G8417 CALC BMI ABV UP PARAM F/U: HCPCS | Performed by: PHYSICIAN ASSISTANT

## 2024-02-07 PROCEDURE — 99214 OFFICE O/P EST MOD 30 MIN: CPT | Performed by: PHYSICIAN ASSISTANT

## 2024-02-07 PROCEDURE — G8484 FLU IMMUNIZE NO ADMIN: HCPCS | Performed by: PHYSICIAN ASSISTANT

## 2024-02-07 PROCEDURE — 3044F HG A1C LEVEL LT 7.0%: CPT | Performed by: PHYSICIAN ASSISTANT

## 2024-02-07 PROCEDURE — G8427 DOCREV CUR MEDS BY ELIG CLIN: HCPCS | Performed by: PHYSICIAN ASSISTANT

## 2024-02-07 PROCEDURE — 3017F COLORECTAL CA SCREEN DOC REV: CPT | Performed by: PHYSICIAN ASSISTANT

## 2024-02-07 PROCEDURE — 2022F DILAT RTA XM EVC RTNOPTHY: CPT | Performed by: PHYSICIAN ASSISTANT

## 2024-02-07 PROCEDURE — G2211 COMPLEX E/M VISIT ADD ON: HCPCS | Performed by: PHYSICIAN ASSISTANT

## 2024-02-07 PROCEDURE — 1123F ACP DISCUSS/DSCN MKR DOCD: CPT | Performed by: PHYSICIAN ASSISTANT

## 2024-02-07 RX ORDER — CHOLECALCIFEROL (VITAMIN D3) 1250 MCG
CAPSULE ORAL
COMMUNITY

## 2024-02-07 ASSESSMENT — PATIENT HEALTH QUESTIONNAIRE - PHQ9
SUM OF ALL RESPONSES TO PHQ QUESTIONS 1-9: 0
SUM OF ALL RESPONSES TO PHQ QUESTIONS 1-9: 0
1. LITTLE INTEREST OR PLEASURE IN DOING THINGS: 0
SUM OF ALL RESPONSES TO PHQ QUESTIONS 1-9: 0
SUM OF ALL RESPONSES TO PHQ QUESTIONS 1-9: 0
SUM OF ALL RESPONSES TO PHQ9 QUESTIONS 1 & 2: 0
2. FEELING DOWN, DEPRESSED OR HOPELESS: 0

## 2024-02-07 ASSESSMENT — ENCOUNTER SYMPTOMS
SHORTNESS OF BREATH: 0
COUGH: 0

## 2024-02-07 NOTE — PROGRESS NOTES
Alonso Kruse is a 68 y.o. male  Chief Complaint   Patient presents with    Follow-up     Review lab work- needs wellness visit- no concerns      Vitals:    02/07/24 1108   BP: 115/74   Pulse: 62   Resp: 19   Temp: 97.7 °F (36.5 °C)   SpO2: 97%      Wt Readings from Last 3 Encounters:   02/07/24 118.8 kg (262 lb)   01/19/24 117 kg (258 lb)   01/09/24 117 kg (258 lb)     BMI Readings from Last 3 Encounters:   02/07/24 34.57 kg/m²   01/19/24 34.04 kg/m²   01/09/24 34.04 kg/m²     Health Maintenance Due   Topic Date Due    Diabetic retinal exam  Never done    DTaP/Tdap/Td vaccine (1 - Tdap) Never done    Respiratory Syncytial Virus (RSV) Pregnant or age 60 yrs+ (1 - 1-dose 60+ series) Never done    Shingles vaccine (2 of 2) 10/05/2021    Prostate Specific Antigen (PSA) Screening or Monitoring  01/05/2022    Pneumococcal 65+ years Vaccine (2 - PCV) 10/18/2022    Diabetic foot exam  06/14/2023    Flu vaccine (1) Never done    COVID-19 Vaccine (5 - 2023-24 season) 12/20/2023     HPI  DM II - diet controlled. Feeling well. Avoiding sweets & juice.    Hemoglobin A1C (%)   Date Value   01/29/2024 6.7 (H)     LDL Calculated (mg/dL)   Date Value   01/29/2024 47     Creatinine (mg/dL)   Date Value   01/29/2024 1.26       Currently taking:   Key Antihyperglycemic Medications       Patient is on no antihyperglycemic meds.          Weight slightly increased. Planning to change diet/exercise to lose weight.   Wt Readings from Last 3 Encounters:   02/07/24 118.8 kg (262 lb)   01/19/24 117 kg (258 lb)   01/09/24 117 kg (258 lb)       Having trouble cutting toenails. Would like to see Podiatry.     CV follow up. Hx A Fib. Seeing Cardiology regularly. Feeling well.   BP controlled:  BP Readings from Last 3 Encounters:   02/07/24 115/74   01/09/24 113/75   09/07/23 (!) 151/77     Currently taking:   Key Anti-Hypertensive Meds            carvedilol (COREG) 25 MG tablet (Taking)    Sig: TAKE 1 TABLET BY MOUTH TWICE A DAY

## 2024-02-07 NOTE — PROGRESS NOTES
I have reviewed all needed documentation in preparation for visit. Verified patient by name and date of birth  Chief Complaint   Patient presents with    Follow-up     Review lab work- needs wellness visit- no concerns        Vitals:    02/07/24 1108   BP: 115/74   Site: Right Upper Arm   Position: Sitting   Cuff Size: Medium Adult   Pulse: 62   Resp: 19   Temp: 97.7 °F (36.5 °C)   TempSrc: Temporal   SpO2: 97%   Weight: 118.8 kg (262 lb)   Height: 1.854 m (6' 1\")       Health Maintenance Due   Topic Date Due    Diabetic retinal exam  Never done    DTaP/Tdap/Td vaccine (1 - Tdap) Never done    Respiratory Syncytial Virus (RSV) Pregnant or age 60 yrs+ (1 - 1-dose 60+ series) Never done    Shingles vaccine (2 of 2) 10/05/2021    Prostate Specific Antigen (PSA) Screening or Monitoring  01/05/2022    Pneumococcal 65+ years Vaccine (2 - PCV) 10/18/2022    Diabetic foot exam  06/14/2023    Flu vaccine (1) Never done    COVID-19 Vaccine (5 - 2023-24 season) 12/20/2023       1. \"Have you been to the ER, urgent care clinic since your last visit?  Hospitalized since your last visit?\" No    2. \"Have you seen or consulted any other health care providers outside of the [facility] since your last visit?\" YES     3. For patients aged 45-75: Has the patient had a colonoscopy / FIT/ Cologuard? Yes - no Care Gap present      If the patient is female:    4. For patients aged 40-74: Has the patient had a mammogram within the past 2 years? NA - based on age or sex      5. For patients aged 21-65: Has the patient had a pap smear? NA - based on age or sex        Mirna dominguez CMA

## 2024-03-11 ENCOUNTER — TELEPHONE (OUTPATIENT)
Age: 69
End: 2024-03-11

## 2024-03-11 DIAGNOSIS — G47.33 OSA ON CPAP: ICD-10-CM

## 2024-03-11 DIAGNOSIS — R80.9 TYPE 2 DIABETES MELLITUS WITH MICROALBUMINURIA, WITHOUT LONG-TERM CURRENT USE OF INSULIN (HCC): Primary | ICD-10-CM

## 2024-03-11 DIAGNOSIS — E66.01 SEVERE OBESITY (HCC): ICD-10-CM

## 2024-03-11 DIAGNOSIS — E11.29 TYPE 2 DIABETES MELLITUS WITH MICROALBUMINURIA, WITHOUT LONG-TERM CURRENT USE OF INSULIN (HCC): Primary | ICD-10-CM

## 2024-03-11 NOTE — TELEPHONE ENCOUNTER
----- Message from Val Meredith sent at 3/6/2024  1:38 PM EST -----  Subject: Referral Request    Reason for referral request? pt. Alonso Kruse is requesting a referral   to see a Dietitian and nutritionist  Provider patient wants to be referred to(if known):     Provider Phone Number(if known):    Additional Information for Provider?   ---------------------------------------------------------------------------  --------------  CALL BACK INFO    [phone]; OK to leave message on voicemail,OK to respond with electronic   message via [employer] portal (only for patients who have registered [employer]   account)  ---------------------------------------------------------------------------  --------------

## 2024-03-12 NOTE — TELEPHONE ENCOUNTER
Wrote referral to DM Education. Please instruct pt to call [facility]   [place] Phone number: [phone]

## 2024-03-13 NOTE — TELEPHONE ENCOUNTER
Called patient and verified name and date of birth.    Called and spoke to pt.     Informed pt per PCP Jatin, that a DM Education referral has been placed, and to call [facility] for Diabetes. Provided the patient with [place] phone number [phone].    Patient verbalized understanding of information discussed w/ no further questions at this time.    Signed by CHAD PHILLIPS MA  03/13/24   12:00 PM

## 2024-03-18 ENCOUNTER — OFFICE VISIT (OUTPATIENT)
Age: 69
End: 2024-03-18
Payer: MEDICARE

## 2024-03-18 DIAGNOSIS — R80.9 TYPE 2 DIABETES MELLITUS WITH MICROALBUMINURIA, WITHOUT LONG-TERM CURRENT USE OF INSULIN (HCC): Primary | ICD-10-CM

## 2024-03-18 DIAGNOSIS — E11.29 TYPE 2 DIABETES MELLITUS WITH MICROALBUMINURIA, WITHOUT LONG-TERM CURRENT USE OF INSULIN (HCC): Primary | ICD-10-CM

## 2024-03-18 PROCEDURE — G0108 DIAB MANAGE TRN  PER INDIV: HCPCS

## 2024-03-18 NOTE — PROGRESS NOTES
[facility]  Diabetes Self-Management Education & Support Program    Reason for Referral: DSMES - Initial yr   Referral Source: Guerline Steiner PA-C  Services requested: DSMES       ASSESSMENT    From my perspective, the participant would benefit from DSMES specifically related to reducing risks, healthy eating, monitoring, taking medications, physical activity, healthy coping, and problem solving. Will adapt DSMES program to build on participant's skills score, confidence score, and preparedness score as noted in the Diabetes Skills, Confidence, and Preparedness Index.    During the program, we will focus on providing DSMES that specifically addresses participant's interest in healthy eating, as shown by their reported readiness to change.    The participant would be best served by attending weekly individual sessions.    Diabetes Self-Management Education Follow-up Visit: 3/28/24       Clinical Presentation  Alonso Kruse is a 68 y.o.  male referred for diabetes self-management education. Participant has Type 2 DM not on insulin for <1 year. Family history positive (grandmother, uncle , sister)  for diabetes. Patient reports not receiving DSMES services in the past. Most recent A1c value:   Lab Results   Component Value Date/Time    LABA1C 6.7 01/29/2024 08:05 AM       Diabetes-related medical history:  Acute complications  None per chart      Diabetes-related medications:  Current dosing: This patient does not have an active medication from one of the medication groupers.    Blood Pressure Management  This patient does not have an active medication from one of the medication groupers.      Lipid Management  rosuvastatin - 20 MG      Clot Prevention  apixaban Tabs - 5 MG    Learning Assessment  Learning objectives Educator assessment (3/18/2024)   Diabetes Disease Process  The participant can   A) describe diabetes in basic terms;   B) state the type of diabetes they  0

## 2024-03-28 ENCOUNTER — TELEMEDICINE (OUTPATIENT)
Age: 69
End: 2024-03-28
Payer: MEDICARE

## 2024-03-28 DIAGNOSIS — R80.9 TYPE 2 DIABETES MELLITUS WITH MICROALBUMINURIA, WITHOUT LONG-TERM CURRENT USE OF INSULIN (HCC): Primary | ICD-10-CM

## 2024-03-28 DIAGNOSIS — E11.29 TYPE 2 DIABETES MELLITUS WITH MICROALBUMINURIA, WITHOUT LONG-TERM CURRENT USE OF INSULIN (HCC): Primary | ICD-10-CM

## 2024-03-28 PROCEDURE — G0108 DIAB MANAGE TRN  PER INDIV: HCPCS

## 2024-04-11 ENCOUNTER — TELEPHONE (OUTPATIENT)
Age: 69
End: 2024-04-11

## 2024-04-11 NOTE — TELEPHONE ENCOUNTER
\"Namita Arevalo [facility]  Staff  Subject: Referral Request    Reason for referral request? patient would like provider to fax lab orders  to lab core, sees provider Jatin  Provider patient wants to be referred to(if known):    Provider Phone Number(if known):    Additional Information for Provider?\"    Please advised

## 2024-04-12 ENCOUNTER — TELEPHONE (OUTPATIENT)
Age: 69
End: 2024-04-12

## 2024-04-12 NOTE — TELEPHONE ENCOUNTER
I got a weird fax from [facility] [place] and I think pt had a recent hospital stay there?     Please help pt schedule a 30 minute hospital follow up visit and request discharge records from [facility].

## 2024-04-12 NOTE — TELEPHONE ENCOUNTER
Note to self:   Fax from [facility] received:   CT abd pelvic with contrast showed incidental \"Indeterminate left adrenal nodule\". Needs dedicated imaging to further characterize.

## 2024-04-15 ENCOUNTER — TELEMEDICINE (OUTPATIENT)
Age: 69
End: 2024-04-15
Payer: MEDICARE

## 2024-04-15 ENCOUNTER — TELEPHONE (OUTPATIENT)
Age: 69
End: 2024-04-15

## 2024-04-15 DIAGNOSIS — R80.9 TYPE 2 DIABETES MELLITUS WITH MICROALBUMINURIA, WITHOUT LONG-TERM CURRENT USE OF INSULIN (HCC): Primary | ICD-10-CM

## 2024-04-15 DIAGNOSIS — E11.29 TYPE 2 DIABETES MELLITUS WITH MICROALBUMINURIA, WITHOUT LONG-TERM CURRENT USE OF INSULIN (HCC): Primary | ICD-10-CM

## 2024-04-15 PROCEDURE — G0108 DIAB MANAGE TRN  PER INDIV: HCPCS

## 2024-04-15 NOTE — TELEPHONE ENCOUNTER
Patient said the ER doctor at the [facility] facility on [facility] noticed he had some spotting on his kidney, suggest he follow up with Jatin and get an MRI.   Patient understands that he needs to have some bloodwork done for Guerline Steiner as well and would like to get all new orders put in for him to go and have the MRI and the bloodwork done and then make an AWV to come in. Please call the patient back to advise and discuss the potential orders. Thanks!

## 2024-04-15 NOTE — PROGRESS NOTES
[facility]  Diabetes Self-Management Education & Support Program  Encounter Note      SUMMARY  Diabetes self-care management training was completed related to monitoring. he participant will return on April 25 to continue DSMES related to reducing risks. The participant did identify SMART Goal(s) and will practice knowledge and skills related to monitoring to improve diabetes self-management.        EVALUATION:  Blood sugars= Haven't check recently, however previous bs on the 100's . States highest bs 157.   Healthy eating = some questions answered today. States following the healthy plate , however at times likes to eat only fish and vegetables. Patient not on DM medications  Patient with interest in learning more about how to manage his diabetes.   Monitoring= Taught how to purchase and use control solution to check test strips .     RECOMMENDATIONS:  - continue with lifestyle changes  - check test strips with control solution      TOPICS DISCUSSED TODAY:  HOW CAN BLOOD GLUCOSE MONITORING HELP ME? 60      Next provider visit is scheduled for 8/7/24       SMART GOAL(S)   Will purchase control solution and check test strips .   ACHIEVEMENT OF GOAL(S) : 0-24%         DATE DSMES TOPIC EVALUATION     4/15/2024 HOW CAN BLOOD GLUCOSE MONITORING HELP ME?   Value of blood glucose monitoring   Realistic expectations   Blood glucose monitoring targets   Target adjustments   Setting a1c & blood glucose targets with provider   Meter selection    Technique for obtaining blood droplet   Blood glucose testing sites   Determining best times to test   Pregnancy recommendations   Data sharing with provider        The participant   Can demonstrate their glucometer procedure: pt states have knowledge  Logs their BG readings:  Yes    The participant needs to address : check test strips with control solution.     Saray Patel RN on 4/15/2024 at 4:32 PM    I have personally reviewed the health record,

## 2024-04-15 NOTE — PROGRESS NOTES
[facility]  Diabetes Self-Management Education & Support Program  Encounter Note      SUMMARY  Diabetes self-care management training was completed related to healthy eating. he participant will return on April 05 to continue DSMES related to monitoring. The participant did identify SMART Goal(s) and will practice knowledge and skills related to healthy eating to improve diabetes self-management.        EVALUATION:  Blood Sugars= Most blood sugars at goal. Will work for bs target.   DATE            FASTING BS                  DINNER   3/20               147                                 139  3/21               134                                 128  3/22               127                                 -----  3/23               135                                 119  3/24               150     after lunch BS-128 -----  3/25               gym - ate an apple- 11:19 am  BS= 99  3/26                144                                 134  3/27                117                                  ___      RECOMMENDATIONS:  - continue with lifestyle changes     TOPICS DISCUSSED TODAY:  WHAT CAN I EAT? 60      Next provider visit is scheduled: Not schedule at this time.        SMART GOAL(S)   Will practice counting CHO for each meal.   ACHIEVEMENT OF GOAL(S) : 0-24%           DATE DSMES TOPIC EVALUATION     4/15/2024 WHAT CAN I EAT?   General principles   Determining a healthy weight   Nutritional terms & tools   Healthy Plate method   Carbohydrate Counting   Reading food labels   Free apps   Pregnancy recommendations      The participant   Uses Healthy Plate principles in constructing meals: Yes  Reads food labels in choosing acceptable foods: Yes    The participant needs to address : will practice counting CHO .    Patient use olive oil          Saray Patel RN on 3/28/24 @ 2:30 PM .    I have personally reviewed the health record, including provider notes, laboratory data and current

## 2024-04-17 ENCOUNTER — TELEPHONE (OUTPATIENT)
Age: 69
End: 2024-04-17

## 2024-04-17 LAB — HBA1C MFR BLD: 6.8 % (ref 4.8–5.6)

## 2024-04-17 NOTE — TELEPHONE ENCOUNTER
Please inform pt that I said I need to see him for a hospital follow up before ordering tests. If he is unpleasant about this to you let me know and I will dismiss him from our practice.

## 2024-04-17 NOTE — TELEPHONE ENCOUNTER
Called patient and lvm. Informed him what the provider stated     \" Please inform pt that I said I need to see him for a hospital follow up before ordering tests. If he is unpleasant about this to you let me know and I will dismiss him from our practice. \"    Patient MUST come in for a ED follow up in order to get a MRI schedule.     Please advised.

## 2024-04-17 NOTE — TELEPHONE ENCOUNTER
Called and spoke to patient. I informed pt that the front end was calling to sent up a hospital follow up and needed the hospital informed information for records.    Patient refused to set up appt. He stated that he does not want to be seen for a hospital follow up and explained that he ONLY want a MRI order and schedule the appointment.     He explained that he was seen at [facility]  on March 6th by ER doctor Leighton TORRES. He stated that the doctor saw two spots on his kidneys and requested to get a MRI.    Requesting records from [facility] on 4/17/24.    Please advise patient.

## 2024-04-18 LAB
ALBUMIN SERPL-MCNC: 4.1 G/DL (ref 3.9–4.9)
ALBUMIN/GLOB SERPL: 1.5 {RATIO} (ref 1.2–2.2)
ALP SERPL-CCNC: 79 IU/L (ref 44–121)
ALT SERPL-CCNC: 30 IU/L (ref 0–44)
AST SERPL-CCNC: 21 IU/L (ref 0–40)
BILIRUB SERPL-MCNC: 1.1 MG/DL (ref 0–1.2)
BUN SERPL-MCNC: 24 MG/DL (ref 8–27)
BUN/CREAT SERPL: 18 (ref 10–24)
CALCIUM SERPL-MCNC: 10.6 MG/DL (ref 8.6–10.2)
CHLORIDE SERPL-SCNC: 100 MMOL/L (ref 96–106)
CHOLEST SERPL-MCNC: 102 MG/DL (ref 100–199)
CO2 SERPL-SCNC: 28 MMOL/L (ref 20–29)
CREAT SERPL-MCNC: 1.37 MG/DL (ref 0.76–1.27)
EGFRCR SERPLBLD CKD-EPI 2021: 55 ML/MIN/1.73
GLOBULIN SER CALC-MCNC: 2.7 G/DL (ref 1.5–4.5)
GLUCOSE SERPL-MCNC: 126 MG/DL (ref 70–99)
HDLC SERPL-MCNC: 40 MG/DL
IMP & REVIEW OF LAB RESULTS: NORMAL
LDLC SERPL CALC-MCNC: 47 MG/DL (ref 0–99)
Lab: NORMAL
POTASSIUM SERPL-SCNC: 4.5 MMOL/L (ref 3.5–5.2)
PROT SERPL-MCNC: 6.8 G/DL (ref 6–8.5)
REPORT: NORMAL
REPORT: NORMAL
SODIUM SERPL-SCNC: 139 MMOL/L (ref 134–144)
TRIGL SERPL-MCNC: 69 MG/DL (ref 0–149)
VLDLC SERPL CALC-MCNC: 15 MG/DL (ref 5–40)

## 2024-04-19 ENCOUNTER — TELEMEDICINE (OUTPATIENT)
Age: 69
End: 2024-04-19

## 2024-04-19 DIAGNOSIS — E27.8 ADRENAL NODULE (HCC): Primary | ICD-10-CM

## 2024-04-19 DIAGNOSIS — R93.5 ABNORMAL CT OF THE ABDOMEN: ICD-10-CM

## 2024-04-19 PROBLEM — E27.9 ADRENAL NODULE (HCC): Status: ACTIVE | Noted: 2024-04-19

## 2024-04-19 PROBLEM — N18.31 CKD STAGE 3A, GFR 45-59 ML/MIN (HCC): Status: ACTIVE | Noted: 2024-04-19

## 2024-04-19 RX ORDER — TAMSULOSIN HYDROCHLORIDE 0.4 MG/1
CAPSULE ORAL
COMMUNITY

## 2024-04-19 ASSESSMENT — ENCOUNTER SYMPTOMS
SHORTNESS OF BREATH: 0
COUGH: 0

## 2024-04-19 NOTE — PROGRESS NOTES
Alonso Kruse is a 68 y.o. male who was seen by synchronous (real-time) audio-video technology on 4/19/2024    Consent: Alonso Kruse, who was seen by synchronous (real-time) audio-video technology, and/or his healthcare decision maker, is aware that this patient-initiated, Telehealth encounter on 4/19/2024 is a billable service, with coverage as determined by his insurance carrier. He is aware that he may receive a bill and has provided verbal consent to proceed: YES  Subjective:   Alonso Kruse is a 68 y.o. male who was seen for Follow-up (Pt is requesting MRI)    3/6/24 ER visit for abdominal pain. Found to be diverticulitis and discharged home with AB.   CT abd/pelvis done: diverticulitis seen. Hepatic cysts, renal cysts (stable), and indeterminate bilateral adrenal nodule seen. Needs dedicated adrenal imaging.     Review of Systems   Constitutional:  Negative for fever.   Respiratory:  Negative for cough and shortness of breath.    Cardiovascular:  Negative for chest pain and palpitations.   Neurological:  Negative for headaches.   Psychiatric/Behavioral:  Negative for dysphoric mood.         Objective:         4/19/2024     8:39 AM   Patient-Reported Vitals   Patient-Reported Weight 255.0lbs   Patient-Reported Height 6'1      General: alert, cooperative, no distress   Mental  status: normal mood, behavior, speech, dress, motor activity, and thought processes, able to follow commands   HENT: NCAT   Neck: no visualized mass   Resp: no respiratory distress   Neuro: no gross deficits   Skin: no discoloration or lesions of concern on visible areas   Psychiatric: normal affect, consistent with stated mood, no evidence of hallucinations     Assessment & Plan:       ICD-10-CM    1. Adrenal nodule (HCC)  E27.8 CT ABDOMEN WO CONTRAST Additional Contrast? Radiologist Recommendation      2. Abnormal CT of the abdomen  R93.5 CT ABDOMEN WO CONTRAST Additional Contrast? Radiologist Recommendation            We

## 2024-04-19 NOTE — PROGRESS NOTES
I have reviewed all needed documentation in preparation for visit. Verified patient by name and date of birth.  Pt reports they are physically in the [facility].  Pt is connected in [employer]    Chief Complaint   Patient presents with    Follow-up     Pt is requesting MRI       \"Have you been to the ER, urgent care clinic since your last visit?  Hospitalized since your last visit?\"    YES - When: approximately 2 months ago.  Where and Why: abdominal Pain, [facility] on [facility].    “Have you seen or consulted any other health care providers outside of [facility] since your last visit?”    YES - When: approximately 1  weeks ago.  Where and Why: [facility]  knee pain.            Click Here for Release of Records Request    There were no vitals filed for this visit.    Health Maintenance Due   Topic Date Due    Diabetic retinal exam  Never done    DTaP/Tdap/Td vaccine (1 - Tdap) Never done    Respiratory Syncytial Virus (RSV) Pregnant or age 60 yrs+ (1 - 1-dose 60+ series) Never done    Shingles vaccine (2 of 2) 10/05/2021    Prostate Specific Antigen (PSA) Screening or Monitoring  01/05/2022    Pneumococcal 65+ years Vaccine (2 of 2 - PCV) 10/18/2022    Diabetic foot exam  06/14/2023    COVID-19 Vaccine (5 - 2023-24 season) 12/20/2023    Annual Wellness Visit (Medicare)  04/03/2024       Brea Wu LPN

## 2024-04-25 ENCOUNTER — HOSPITAL ENCOUNTER (OUTPATIENT)
Facility: HOSPITAL | Age: 69
Discharge: HOME | End: 2024-04-25
Payer: MEDICARE

## 2024-04-25 DIAGNOSIS — E27.8 ADRENAL NODULE (HCC): ICD-10-CM

## 2024-04-25 DIAGNOSIS — R93.5 ABNORMAL CT OF THE ABDOMEN: ICD-10-CM

## 2024-04-25 PROCEDURE — 74150 CT ABDOMEN W/O CONTRAST: CPT

## 2024-04-30 ENCOUNTER — OFFICE VISIT (OUTPATIENT)
Age: 69
End: 2024-04-30
Payer: MEDICARE

## 2024-04-30 VITALS
BODY MASS INDEX: 34.59 KG/M2 | DIASTOLIC BLOOD PRESSURE: 69 MMHG | TEMPERATURE: 98.7 F | OXYGEN SATURATION: 95 % | HEIGHT: 73 IN | RESPIRATION RATE: 18 BRPM | WEIGHT: 261 LBS | HEART RATE: 66 BPM | SYSTOLIC BLOOD PRESSURE: 113 MMHG

## 2024-04-30 DIAGNOSIS — E66.01 SEVERE OBESITY (HCC): ICD-10-CM

## 2024-04-30 DIAGNOSIS — D35.00 ADRENAL ADENOMA, UNSPECIFIED LATERALITY: Primary | ICD-10-CM

## 2024-04-30 DIAGNOSIS — N18.31 CKD STAGE 3A, GFR 45-59 ML/MIN (HCC): ICD-10-CM

## 2024-04-30 PROCEDURE — 1036F TOBACCO NON-USER: CPT | Performed by: PHYSICIAN ASSISTANT

## 2024-04-30 PROCEDURE — G8417 CALC BMI ABV UP PARAM F/U: HCPCS | Performed by: PHYSICIAN ASSISTANT

## 2024-04-30 PROCEDURE — 1123F ACP DISCUSS/DSCN MKR DOCD: CPT | Performed by: PHYSICIAN ASSISTANT

## 2024-04-30 PROCEDURE — G2211 COMPLEX E/M VISIT ADD ON: HCPCS | Performed by: PHYSICIAN ASSISTANT

## 2024-04-30 PROCEDURE — 3017F COLORECTAL CA SCREEN DOC REV: CPT | Performed by: PHYSICIAN ASSISTANT

## 2024-04-30 PROCEDURE — 3078F DIAST BP <80 MM HG: CPT | Performed by: PHYSICIAN ASSISTANT

## 2024-04-30 PROCEDURE — G8427 DOCREV CUR MEDS BY ELIG CLIN: HCPCS | Performed by: PHYSICIAN ASSISTANT

## 2024-04-30 PROCEDURE — 99214 OFFICE O/P EST MOD 30 MIN: CPT | Performed by: PHYSICIAN ASSISTANT

## 2024-04-30 PROCEDURE — 3074F SYST BP LT 130 MM HG: CPT | Performed by: PHYSICIAN ASSISTANT

## 2024-04-30 ASSESSMENT — ENCOUNTER SYMPTOMS
COUGH: 0
SHORTNESS OF BREATH: 0

## 2024-04-30 NOTE — PROGRESS NOTES
Alonso Kruse is a 68 y.o. male  Chief Complaint   Patient presents with    Follow-up     No concerns      Vitals:    04/30/24 0943   BP: 113/69   Pulse: 66   Resp: 18   Temp: 98.7 °F (37.1 °C)   SpO2: 95%      Wt Readings from Last 3 Encounters:   04/30/24 118.4 kg (261 lb)   04/15/24 115.7 kg (255 lb)   02/19/24 118.8 kg (262 lb)     BMI Readings from Last 3 Encounters:   04/30/24 34.43 kg/m²   04/15/24 33.64 kg/m²   02/19/24 34.57 kg/m²     Health Maintenance Due   Topic Date Due    Diabetic retinal exam  Never done    DTaP/Tdap/Td vaccine (1 - Tdap) Never done    Respiratory Syncytial Virus (RSV) Pregnant or age 60 yrs+ (1 - 1-dose 60+ series) Never done    Shingles vaccine (2 of 2) 10/05/2021    Prostate Specific Antigen (PSA) Screening or Monitoring  01/05/2022    Pneumococcal 65+ years Vaccine (2 of 2 - PCV) 10/18/2022    Diabetic foot exam  06/14/2023    COVID-19 Vaccine (5 - 2023-24 season) 12/20/2023    Annual Wellness Visit (Medicare)  04/03/2024     HPI  CT 4/28 showed  Multifocal nodularity of the left adrenal gland with the superior nodule measuring 31 mm in diameter measuring 17 Hounsfield units and the inferior nodule measuring 19 mm, measuring 8 Hounsfield units. In the right adrenal gland, there is an 18 mm nodule measuring -15 Hounsfield units. The low-density lesions at the lower pole of the left adrenal gland and in the right adrenal gland are consistent with lipid rich adenomas. The upper left adrenal nodule is indeterminate by density.    Pt reports that he had other incidental adrenal adenomas seen ~ 1 decade ago but unsure of size and never had evaluation with Endocrine at that time.     Interested in knowing if he could possibly donate a kidney to his friend.   Hx CKD 3  Lab Results   Component Value Date    CREATININE 1.37 (H) 04/17/2024    CREATININE 1.26 01/29/2024    CREATININE 1.29 (H) 01/11/2024         ROS  Review of Systems   Constitutional:  Negative for fever.   Respiratory:

## 2024-04-30 NOTE — PROGRESS NOTES
I have reviewed all needed documentation in preparation for visit. Verified patient by name and date of birth  Chief Complaint   Patient presents with    Follow-up     No concerns        Vitals:    04/30/24 0943   BP: 113/69   Site: Right Upper Arm   Position: Sitting   Cuff Size: Medium Adult   Pulse: 66   Resp: 18   Temp: 98.7 °F (37.1 °C)   TempSrc: Temporal   SpO2: 95%   Weight: 118.4 kg (261 lb)   Height: 1.854 m (6' 1\")       Health Maintenance Due   Topic Date Due    Diabetic retinal exam  Never done    DTaP/Tdap/Td vaccine (1 - Tdap) Never done    Respiratory Syncytial Virus (RSV) Pregnant or age 60 yrs+ (1 - 1-dose 60+ series) Never done    Shingles vaccine (2 of 2) 10/05/2021    Prostate Specific Antigen (PSA) Screening or Monitoring  01/05/2022    Pneumococcal 65+ years Vaccine (2 of 2 - PCV) 10/18/2022    Diabetic foot exam  06/14/2023    COVID-19 Vaccine (5 - 2023-24 season) 12/20/2023    Annual Wellness Visit (Medicare)  04/03/2024     \"Have you been to the ER, urgent care clinic since your last visit?  Hospitalized since your last visit?\"    NO    “Have you seen or consulted any other health care providers outside of [facility] since your last visit?”    YES            Click Here for Release of Records Request         Mirna dominguez CMA   A-T Advancement Flap Text: Because of the full-thickness nature of the defect and location adjacent to important structure(s), a bilateral advancement flap (A to T) was planned. After prep and anesthesia, a Burow's triangle was excised adjacent to the defect.  Incisions were extended from the opposite side of the wound, and smaller Burow's triangles at the end of each incision.  Two flaps were created by undermining in the subcutaneous plane. After hemostasis was obtained, the flaps were carried over and sutured in a layered fashion.

## 2024-05-03 DIAGNOSIS — I48.0 PAROXYSMAL ATRIAL FIBRILLATION (HCC): ICD-10-CM

## 2024-05-03 RX ORDER — APIXABAN 5 MG/1
5 TABLET, FILM COATED ORAL 2 TIMES DAILY
Qty: 180 TABLET | Refills: 3 | Status: SHIPPED | OUTPATIENT
Start: 2024-05-03

## 2024-05-07 ENCOUNTER — OFFICE VISIT (OUTPATIENT)
Age: 69
End: 2024-05-07
Payer: MEDICARE

## 2024-05-07 VITALS
WEIGHT: 260 LBS | DIASTOLIC BLOOD PRESSURE: 78 MMHG | HEART RATE: 68 BPM | BODY MASS INDEX: 34.46 KG/M2 | TEMPERATURE: 98.7 F | HEIGHT: 73 IN | OXYGEN SATURATION: 97 % | SYSTOLIC BLOOD PRESSURE: 121 MMHG

## 2024-05-07 DIAGNOSIS — D35.02 BILATERAL ADRENAL ADENOMAS: Primary | ICD-10-CM

## 2024-05-07 DIAGNOSIS — E21.5 PARATHYROID RELATED HYPERCALCEMIA (HCC): ICD-10-CM

## 2024-05-07 DIAGNOSIS — D35.02 BILATERAL ADRENAL ADENOMAS: ICD-10-CM

## 2024-05-07 DIAGNOSIS — E83.52 PARATHYROID RELATED HYPERCALCEMIA (HCC): ICD-10-CM

## 2024-05-07 DIAGNOSIS — D35.01 BILATERAL ADRENAL ADENOMAS: Primary | ICD-10-CM

## 2024-05-07 DIAGNOSIS — D35.01 BILATERAL ADRENAL ADENOMAS: ICD-10-CM

## 2024-05-07 PROCEDURE — 3017F COLORECTAL CA SCREEN DOC REV: CPT | Performed by: INTERNAL MEDICINE

## 2024-05-07 PROCEDURE — 3074F SYST BP LT 130 MM HG: CPT | Performed by: INTERNAL MEDICINE

## 2024-05-07 PROCEDURE — 99214 OFFICE O/P EST MOD 30 MIN: CPT | Performed by: INTERNAL MEDICINE

## 2024-05-07 PROCEDURE — 1123F ACP DISCUSS/DSCN MKR DOCD: CPT | Performed by: INTERNAL MEDICINE

## 2024-05-07 PROCEDURE — G8428 CUR MEDS NOT DOCUMENT: HCPCS | Performed by: INTERNAL MEDICINE

## 2024-05-07 PROCEDURE — G8417 CALC BMI ABV UP PARAM F/U: HCPCS | Performed by: INTERNAL MEDICINE

## 2024-05-07 PROCEDURE — 1036F TOBACCO NON-USER: CPT | Performed by: INTERNAL MEDICINE

## 2024-05-07 PROCEDURE — 3078F DIAST BP <80 MM HG: CPT | Performed by: INTERNAL MEDICINE

## 2024-05-07 NOTE — PROGRESS NOTES
Endocrine Consultation    PCP: Guerline Steiner PA-C    Chief Complaint   Patient presents with    New Patient    Adrenal Adenoma     HPI:  Alonso Kruse is a 69 y.o. male with  has a past medical history of Atrial fibrillation (HCC), Diabetes mellitus (HCC), Hyperlipidemia, Hypertension, Prostate cancer (HCC), and Sleep apnea. who is seen in consultation at the request of Guerline Steiner PA-C for evaluation of adrenal adenoma.     Prostate ca s/p XRT     Patient of Dr. Rich, last visit 1/2024  Saw dr rich for hypercalcemia   \"Normal Ca for several years (so very unlikely [facility]) then high since summer  PTH elevated so seems PTH mediated  HCTZ was increased right before this which could be the culprit  Check Vit D level as that can contribute if low  In mild cases usually we just watch over time as if over-active parathyroid often difficult to identify; and this is ok as long as low risk bone loss and for kidney stones  Get labs  Decide if need to lower or stop HCTZ to further investigate   US does not show any obvious parathyroid  Thyroid gland is normal in size and texture and without any nodules   TSH is normal too\"    Adrenal nodule - per pt dx 9 years ago   Had repeat CT 2020 - no reports here   Only CT report I have here is from 4/19/24    Adrenal CT/MRI: 4/2024  Multifocal nodularity of the left adrenal gland   Superior nodule measuring 31 mm, 17 HU  inferior nodule measuring 19 mm,  8 HU  Right adrenal gland  18 mm nodule measuring -15 HU  low-density lesions at the lower pole of the left adrenal gland and in the right  adrenal gland are consistent with lipid rich adenomas. The upper left adrenal  nodule is indeterminate by density.    Labs: no prior endocrine evaluations for cushings, pheo or hyperaldo     No   Rapid weight gain, buffalo hump, proximal weakness, easy bruising, acne, striae, skin thinning,   irritability, depression, hyperpigmentation    Full ROS completed this visit:neg unless specified

## 2024-05-07 NOTE — ASSESSMENT & PLAN NOTE
Adrenal CT demonstrated multiple nodules outlined above   I only have CT 4/2024, but per pt prior Cts dating years back, bring prior scan report to follow up   Asymptomatic clinically  Reviewed the possible differentials including benign non functional nodule, functional nodule and less likely adrenal cancer  Will obtain the tests ordered below to evaluate for functionality of nodule to exclude pheochromocytoma, and Cushing's syndrome  Will hold on ARR as on aldactone  Repeat CT after initial labs - CT with adrenal protocol   If nodule > 4 cm and /or increase in size > 1 cm annually during serveillence will seek surgical opinion  Patient verbalized understanding and is agreeable to assessment plan outlined above.

## 2024-05-07 NOTE — ASSESSMENT & PLAN NOTE
S/p eval with dr holden  Normal Ca for several years (so very unlikely [facility]) then high since summer  PTH elevated so seems PTH mediated  HCTZ was increased right before this which could be the culprit  Check Vit D level as that can contribute if low  In mild cases usually we just watch over time as if over-active parathyroid often difficult to identify; and this is ok as long as low risk bone loss and for kidney stones  Get labs  Decide if need to lower or stop HCTZ to further investigate     Repeat Vit D+Ca+PTH with next set of labs   C/w Vit D supplementation

## 2024-05-07 NOTE — PROGRESS NOTES
Alonso Kruse is a 69 y.o. male here for   Chief Complaint   Patient presents with    New Patient    Adrenal Adenoma       1. Have you been to the ER, urgent care clinic since your last visit?  Hospitalized since your last visit? -N/A    2. Have you seen or consulted any other health care providers outside of the [facility] since your last visit?  Include any pap smears or colon screening.-N/A

## 2024-05-08 ENCOUNTER — TELEPHONE (OUTPATIENT)
Age: 69
End: 2024-05-08

## 2024-05-08 NOTE — TELEPHONE ENCOUNTER
Pt states that he is having the same pains as when he was diagnosed with Diverticulitis in March. He would like to get a script for the following medication    Amoxicillin 875 mg. He would like a call back with an update. Pt is ok with a VV Appt.

## 2024-05-09 LAB
25(OH)D3+25(OH)D2 SERPL-MCNC: 51.8 NG/ML (ref 30–100)
BUN SERPL-MCNC: 30 MG/DL (ref 8–27)
BUN/CREAT SERPL: 20 (ref 10–24)
CALCIUM SERPL-MCNC: 10 MG/DL (ref 8.6–10.2)
CHLORIDE SERPL-SCNC: 99 MMOL/L (ref 96–106)
CO2 SERPL-SCNC: 22 MMOL/L (ref 20–29)
CREAT SERPL-MCNC: 1.53 MG/DL (ref 0.76–1.27)
DHEA-S SERPL-MCNC: 30.4 UG/DL (ref 30.9–295.6)
EGFRCR SERPLBLD CKD-EPI 2021: 48 ML/MIN/1.73
GLUCOSE SERPL-MCNC: 125 MG/DL (ref 70–99)
POTASSIUM SERPL-SCNC: 4.4 MMOL/L (ref 3.5–5.2)
PTH-INTACT SERPL-MCNC: 21 PG/ML (ref 15–65)
REPORT: NORMAL
SODIUM SERPL-SCNC: 137 MMOL/L (ref 134–144)

## 2024-05-10 ENCOUNTER — OFFICE VISIT (OUTPATIENT)
Age: 69
End: 2024-05-10
Payer: MEDICARE

## 2024-05-10 VITALS
HEART RATE: 69 BPM | WEIGHT: 260.8 LBS | BODY MASS INDEX: 34.57 KG/M2 | TEMPERATURE: 97.5 F | DIASTOLIC BLOOD PRESSURE: 83 MMHG | HEIGHT: 73 IN | SYSTOLIC BLOOD PRESSURE: 134 MMHG | OXYGEN SATURATION: 93 % | RESPIRATION RATE: 14 BRPM

## 2024-05-10 DIAGNOSIS — R10.32 LEFT LOWER QUADRANT ABDOMINAL PAIN: ICD-10-CM

## 2024-05-10 DIAGNOSIS — K57.92 DIVERTICULITIS: Primary | ICD-10-CM

## 2024-05-10 PROBLEM — I48.91 ATRIAL FIBRILLATION (HCC): Status: ACTIVE | Noted: 2024-05-10

## 2024-05-10 PROCEDURE — 1036F TOBACCO NON-USER: CPT | Performed by: NURSE PRACTITIONER

## 2024-05-10 PROCEDURE — 99213 OFFICE O/P EST LOW 20 MIN: CPT | Performed by: NURSE PRACTITIONER

## 2024-05-10 PROCEDURE — G8417 CALC BMI ABV UP PARAM F/U: HCPCS | Performed by: NURSE PRACTITIONER

## 2024-05-10 PROCEDURE — 1123F ACP DISCUSS/DSCN MKR DOCD: CPT | Performed by: NURSE PRACTITIONER

## 2024-05-10 PROCEDURE — 3075F SYST BP GE 130 - 139MM HG: CPT | Performed by: NURSE PRACTITIONER

## 2024-05-10 PROCEDURE — 3017F COLORECTAL CA SCREEN DOC REV: CPT | Performed by: NURSE PRACTITIONER

## 2024-05-10 PROCEDURE — 3079F DIAST BP 80-89 MM HG: CPT | Performed by: NURSE PRACTITIONER

## 2024-05-10 PROCEDURE — G8427 DOCREV CUR MEDS BY ELIG CLIN: HCPCS | Performed by: NURSE PRACTITIONER

## 2024-05-10 RX ORDER — AMOXICILLIN AND CLAVULANATE POTASSIUM 875; 125 MG/1; MG/1
1 TABLET, FILM COATED ORAL 2 TIMES DAILY
Qty: 20 TABLET | Refills: 0 | Status: SHIPPED | OUTPATIENT
Start: 2024-05-10 | End: 2024-05-20

## 2024-05-10 ASSESSMENT — ENCOUNTER SYMPTOMS
BLOOD IN STOOL: 0
ABDOMINAL PAIN: 1
BACK PAIN: 0
CONSTIPATION: 1
RECTAL PAIN: 0
SHORTNESS OF BREATH: 0
ABDOMINAL DISTENTION: 0

## 2024-05-10 NOTE — TELEPHONE ENCOUNTER
Patient is coming in person at 11pm with the nurse practitioner       Patient voice understand,    Carmen JOHNSON

## 2024-05-10 NOTE — PROGRESS NOTES
Alonso Kruse is a 69 y.o. male  Chief Complaint   Patient presents with    Medication Check     Having lower abdominal pain        Vitals:    05/10/24 1058   BP: 134/83   Pulse: 69   Resp: 14   Temp: 97.5 °F (36.4 °C)   SpO2: 93%      Wt Readings from Last 3 Encounters:   05/10/24 118.3 kg (260 lb 12.8 oz)   05/07/24 117.9 kg (260 lb)   04/30/24 118.4 kg (261 lb)     BMI Readings from Last 3 Encounters:   05/10/24 34.41 kg/m²   05/07/24 34.30 kg/m²   04/30/24 34.43 kg/m²     Health Maintenance Due   Topic Date Due    Diabetic retinal exam  Never done    DTaP/Tdap/Td vaccine (1 - Tdap) Never done    Respiratory Syncytial Virus (RSV) Pregnant or age 60 yrs+ (1 - 1-dose 60+ series) Never done    Shingles vaccine (2 of 2) 10/05/2021    Prostate Specific Antigen (PSA) Screening or Monitoring  01/05/2022    Pneumococcal 65+ years Vaccine (2 of 2 - PCV) 10/18/2022    Diabetic foot exam  06/14/2023    COVID-19 Vaccine (5 - 2023-24 season) 12/20/2023    Annual Wellness Visit (Medicare)  04/03/2024     HPI  Patient of Guerline Steiner here for complaint of diverticulitis symptoms. Reports went to ED March 6th  and was treated with Augmentin.  Did a CT scan.   Went away.   Symptoms resolved    Symptoms currently / restarted 1 week ago. Left lower abdomen    Same. Not as intense.   No fever. Stool/ miralax.   No blood in stool.    Pain rated 2-3 /10. Dull ache.  Scheduled for Endoscopy/ Colonoscopy  6/10/24 Dr Maria   CT abdomen 4/24 Diverticulosis sigmoid colon without active inflammation.     PMH- Atrial fibrillation (HCC), Diabetes mellitus (HCC), Hyperlipidemia, Hypertension, Prostate cancer (HCC), and Sleep apnea, adrenal adenoma.      Going away next week.  ROS  Review of Systems   Constitutional:  Negative for appetite change, fatigue and fever.   Respiratory:  Negative for shortness of breath.    Gastrointestinal:  Positive for abdominal pain and constipation. Negative for abdominal distention, blood in stool and

## 2024-05-14 DIAGNOSIS — E87.6 HYPOKALEMIA: ICD-10-CM

## 2024-05-14 NOTE — TELEPHONE ENCOUNTER
Refill request received from [facility] for   Requested Prescriptions     Pending Prescriptions Disp Refills    KLOR-CON M10 10 MEQ extended release tablet [Pharmacy Med Name: KLOR-CON M10 TABLET] 90 tablet 1     Sig: TAKE 1 TABLET BY MOUTH EVERY DAY     Last appointment: 5/10/2024   Next appointment: 8/7/2024     Routed to WOODROW See for review.

## 2024-05-15 ENCOUNTER — TELEPHONE (OUTPATIENT)
Age: 69
End: 2024-05-15

## 2024-05-15 NOTE — TELEPHONE ENCOUNTER
Called and spoke with pt.  Verified identity x2.    Pt stated he was seen here in the office and given medication last week  Pt states he has improved much and has no pain    Brea Wu LPN

## 2024-05-15 NOTE — TELEPHONE ENCOUNTER
----- Message from Mer Fuchs sent at 5/6/2024 11:18 AM EDT -----  Subject: Message to Provider    QUESTIONS  Information for Provider? Pt would like to know if Dr. Steiner would write   him a RX for Amoxicillin 875 mg for the infection that he had when he went   to the ED in March. Does not want to have another scan done but the   medication did help before. Please give pt a call back.   ---------------------------------------------------------------------------  --------------  CALL BACK INFO  [phone]; OK to leave message on voicemail  ---------------------------------------------------------------------------  --------------  SCRIPT ANSWERS  Relationship to Patient? Self

## 2024-05-17 DIAGNOSIS — E87.6 HYPOKALEMIA: ICD-10-CM

## 2024-05-17 RX ORDER — POTASSIUM CHLORIDE 750 MG/1
10 TABLET, EXTENDED RELEASE ORAL DAILY
Qty: 90 TABLET | Refills: 0 | Status: SHIPPED | OUTPATIENT
Start: 2024-05-17

## 2024-05-17 NOTE — TELEPHONE ENCOUNTER
Pt is requesting a refill for the following medication    KLOR-CON M10 10 MEQ extended release tablet     Pt is going out of town and needs medication as soon as possible.

## 2024-05-17 NOTE — TELEPHONE ENCOUNTER
Refill request received from [facility] for   Requested Prescriptions     Pending Prescriptions Disp Refills    potassium chloride (KLOR-CON M10) 10 MEQ extended release tablet 90 tablet 1     Sig: Take 1 tablet by mouth daily     Last office visit: 5/10/2024   Next office visit: 8/7/2024     Routed to Dr Perri Blake for review.

## 2024-05-21 RX ORDER — POTASSIUM CHLORIDE 750 MG/1
TABLET, EXTENDED RELEASE ORAL
Qty: 90 TABLET | Refills: 1 | OUTPATIENT
Start: 2024-05-21

## 2024-05-31 ENCOUNTER — HOSPITAL ENCOUNTER (OUTPATIENT)
Facility: HOSPITAL | Age: 69
Discharge: HOME | End: 2024-06-03

## 2024-05-31 VITALS
DIASTOLIC BLOOD PRESSURE: 87 MMHG | WEIGHT: 260 LBS | HEIGHT: 73 IN | HEART RATE: 67 BPM | SYSTOLIC BLOOD PRESSURE: 133 MMHG | BODY MASS INDEX: 34.46 KG/M2

## 2024-05-31 DIAGNOSIS — C61 PROSTATE CANCER (HCC): Primary | ICD-10-CM

## 2024-05-31 ASSESSMENT — PAIN SCALES - GENERAL: PAINLEVEL_OUTOF10: 0

## 2024-05-31 NOTE — PROGRESS NOTES
questions or concerns.    Thank you very kindly for the opportunity to participate in the care of your patient, Mr. Kruse.    Plan:  - Continue Flomax  - Will see Dr. Tobin in 3 months  - RTC in 9 months with PSA    Cortez Cha MD  [facility]  [facility]  [address], [address] [address]  P: [phone]  [facility] [address], [place] [address]  P: [phone]  [facility]  [id number] [place] [address]  P: [phone]      Time and Counseling: I spent a total of 22 minutes on the care of this patient on 5/31/24.    Carbon Copy:  David Tobin MD    ICD Code:    ICD-10-CM    1. Prostate cancer (HCC)  C61

## 2024-06-03 DIAGNOSIS — L98.9 SKIN LESION: ICD-10-CM

## 2024-06-03 RX ORDER — CLOTRIMAZOLE AND BETAMETHASONE DIPROPIONATE 10; .64 MG/G; MG/G
CREAM TOPICAL
Qty: 45 G | Refills: 2 | Status: SHIPPED | OUTPATIENT
Start: 2024-06-03

## 2024-06-03 NOTE — TELEPHONE ENCOUNTER
Refill request received from [facility]    for   Requested Prescriptions     Pending Prescriptions Disp Refills    clotrimazole-betamethasone (LOTRISONE) 1-0.05 % cream [Pharmacy Med Name: CLOTRIMAZOLE-BETAMETHASONE CRM] 45 g 1     Sig: APPLY TO AFFECTED AREA TWICE A DAY     Last office visit: 5/10/2024   Next office visit: 8/7/2024     Routed to KIMBERLY Bronson for review.     Brea Wu LPN

## 2024-06-10 ENCOUNTER — ANESTHESIA (OUTPATIENT)
Facility: HOSPITAL | Age: 69
End: 2024-06-10
Payer: MEDICARE

## 2024-06-10 ENCOUNTER — ANESTHESIA EVENT (OUTPATIENT)
Facility: HOSPITAL | Age: 69
End: 2024-06-10
Payer: MEDICARE

## 2024-06-10 ENCOUNTER — HOSPITAL ENCOUNTER (OUTPATIENT)
Facility: HOSPITAL | Age: 69
Setting detail: OUTPATIENT SURGERY
Discharge: HOME | End: 2024-06-10
Attending: INTERNAL MEDICINE | Admitting: INTERNAL MEDICINE
Payer: MEDICARE

## 2024-06-10 VITALS
TEMPERATURE: 96.9 F | OXYGEN SATURATION: 96 % | HEIGHT: 73 IN | WEIGHT: 264 LBS | BODY MASS INDEX: 34.99 KG/M2 | HEART RATE: 62 BPM | RESPIRATION RATE: 19 BRPM | SYSTOLIC BLOOD PRESSURE: 129 MMHG | DIASTOLIC BLOOD PRESSURE: 84 MMHG

## 2024-06-10 LAB — REPORT: NORMAL

## 2024-06-10 PROCEDURE — 6360000002 HC RX W HCPCS: Performed by: NURSE ANESTHETIST, CERTIFIED REGISTERED

## 2024-06-10 PROCEDURE — 88305 TISSUE EXAM BY PATHOLOGIST: CPT

## 2024-06-10 PROCEDURE — 3700000001 HC ADD 15 MINUTES (ANESTHESIA): Performed by: INTERNAL MEDICINE

## 2024-06-10 PROCEDURE — 7100000011 HC PHASE II RECOVERY - ADDTL 15 MIN: Performed by: INTERNAL MEDICINE

## 2024-06-10 PROCEDURE — 2580000003 HC RX 258: Performed by: INTERNAL MEDICINE

## 2024-06-10 PROCEDURE — 2580000003 HC RX 258: Performed by: NURSE ANESTHETIST, CERTIFIED REGISTERED

## 2024-06-10 PROCEDURE — 3600007512: Performed by: INTERNAL MEDICINE

## 2024-06-10 PROCEDURE — 7100000010 HC PHASE II RECOVERY - FIRST 15 MIN: Performed by: INTERNAL MEDICINE

## 2024-06-10 PROCEDURE — 3600007502: Performed by: INTERNAL MEDICINE

## 2024-06-10 PROCEDURE — 3700000000 HC ANESTHESIA ATTENDED CARE: Performed by: INTERNAL MEDICINE

## 2024-06-10 PROCEDURE — 2709999900 HC NON-CHARGEABLE SUPPLY: Performed by: INTERNAL MEDICINE

## 2024-06-10 RX ORDER — SODIUM CHLORIDE 9 MG/ML
INJECTION, SOLUTION INTRAVENOUS CONTINUOUS PRN
Status: COMPLETED | OUTPATIENT
Start: 2024-06-10 | End: 2024-06-10

## 2024-06-10 RX ORDER — SODIUM CHLORIDE, SODIUM LACTATE, POTASSIUM CHLORIDE, CALCIUM CHLORIDE 600; 310; 30; 20 MG/100ML; MG/100ML; MG/100ML; MG/100ML
INJECTION, SOLUTION INTRAVENOUS CONTINUOUS PRN
Status: DISCONTINUED | OUTPATIENT
Start: 2024-06-10 | End: 2024-06-10 | Stop reason: SDUPTHER

## 2024-06-10 RX ORDER — SIMETHICONE 40MG/0.6ML
40 SUSPENSION, DROPS(FINAL DOSAGE FORM)(ML) ORAL AS NEEDED
Status: DISCONTINUED | OUTPATIENT
Start: 2024-06-10 | End: 2024-06-10 | Stop reason: HOSPADM

## 2024-06-10 RX ORDER — SODIUM CHLORIDE 9 MG/ML
INJECTION, SOLUTION INTRAVENOUS CONTINUOUS
Status: DISCONTINUED | OUTPATIENT
Start: 2024-06-10 | End: 2024-06-10 | Stop reason: HOSPADM

## 2024-06-10 RX ORDER — SODIUM CHLORIDE 0.9 % (FLUSH) 0.9 %
5-40 SYRINGE (ML) INJECTION PRN
Status: DISCONTINUED | OUTPATIENT
Start: 2024-06-10 | End: 2024-06-10 | Stop reason: HOSPADM

## 2024-06-10 RX ADMIN — PROPOFOL 100 MG: 10 INJECTION, EMULSION INTRAVENOUS at 15:13

## 2024-06-10 RX ADMIN — PROPOFOL 100 MG: 10 INJECTION, EMULSION INTRAVENOUS at 15:06

## 2024-06-10 RX ADMIN — PROPOFOL 50 MG: 10 INJECTION, EMULSION INTRAVENOUS at 15:25

## 2024-06-10 RX ADMIN — PROPOFOL 50 MG: 10 INJECTION, EMULSION INTRAVENOUS at 15:20

## 2024-06-10 RX ADMIN — PROPOFOL 100 MG: 10 INJECTION, EMULSION INTRAVENOUS at 14:55

## 2024-06-10 RX ADMIN — SODIUM CHLORIDE, POTASSIUM CHLORIDE, SODIUM LACTATE AND CALCIUM CHLORIDE: 600; 310; 30; 20 INJECTION, SOLUTION INTRAVENOUS at 14:52

## 2024-06-10 RX ADMIN — PROPOFOL 50 MG: 10 INJECTION, EMULSION INTRAVENOUS at 14:58

## 2024-06-10 ASSESSMENT — PAIN - FUNCTIONAL ASSESSMENT: PAIN_FUNCTIONAL_ASSESSMENT: 0-10

## 2024-06-10 ASSESSMENT — PAIN DESCRIPTION - DESCRIPTORS: DESCRIPTORS: STABBING

## 2024-06-10 NOTE — OP NOTE
[facility]  [facility]  Varun Feliz MD, [facility]  [phone]         Page 10, 2024    Colonoscopy Procedure Note  Alonso Kruse  :  1955  [place] Medical Record Number: [id number]      Indications:   Rectal bleeding, hx of XRT  PCP:  Guerline Steiner   Anesthesia/Sedation: TIVA  Endoscopist:  Dr. Varun Feliz  Complications:  None  Estimated Blood Loss:  None    Permit:  The indications, risks, benefits and alternatives were reviewed with the patient or their decision maker who was provided an opportunity to ask questions and all questions were answered.  The specific risks of colonoscopy with conscious sedation were reviewed, including but not limited to anesthetic complication, bleeding, adverse drug reaction, missed lesion, infection, IV site reactions, and intestinal perforation which would lead to the need for surgical repair.  Alternatives to colonoscopy including radiographic imaging, observation without testing, or laboratory testing were reviewed including the limitations of those alternatives.  After considering the options and having all their questions answered, the patient or their decision maker provided both verbal and written consent to proceed.        Procedure in Detail:  After obtaining informed consent, positioning of the patient in the left lateral decubitus position, and conduction of a pre-procedure pause or \"time out\" the endoscope was introduced into the anus and advanced to the terminal ileum.  The quality of the colonic preparation was good.  A careful inspection was made as the colonoscope was withdrawn, findings and interventions are described below.    Findings:   -Normal terminal ileum  - Single sessile polyp, 2 to 3 mm in size is noted in the descending colon, removed by cold forceps and retrieved completely.  -There is mild sigmoid diverticulosis noted.  - There is a medium sized ulcerated

## 2024-06-10 NOTE — PERIOP NOTE
See anesthesia note and MAR for medications given during procedure.   Endoscope was pre-cleaned at the bedside immediately following procedure by RICHI Huizar

## 2024-06-10 NOTE — ANESTHESIA PRE PROCEDURE
1.5 04/17/2024 08:04 AM    LABGLOM 48 05/08/2024 08:17 AM    LABGLOM 55 04/17/2024 08:04 AM    GLUCOSE 125 05/08/2024 08:17 AM    CALCIUM 10.0 05/08/2024 08:17 AM    BILITOT 1.1 04/17/2024 08:04 AM    ALKPHOS 79 04/17/2024 08:04 AM    AST 21 04/17/2024 08:04 AM    ALT 30 04/17/2024 08:04 AM       POC Tests: No results for input(s): \"POCGLU\", \"POCNA\", \"POCK\", \"POCCL\", \"POCBUN\", \"POCHEMO\", \"POCHCT\" in the last 72 hours.    Coags: No results found for: \"PROTIME\", \"INR\", \"APTT\"    HCG (If Applicable): No results found for: \"PREGTESTUR\", \"PREGSERUM\", \"HCG\", \"HCGQUANT\"     ABGs: No results found for: \"PHART\", \"PO2ART\", \"JRU3CJK\", \"ZSW9ZXE\", \"BEART\", \"Q5ZYQUFV\"     Type & Screen (If Applicable):  No results found for: \"LABABO\"    Drug/Infectious Status (If Applicable):  No results found for: \"HIV\", \"HEPCAB\"    COVID-19 Screening (If Applicable):   Lab Results   Component Value Date/Time    COVID19 Performed 10/18/2021 12:00 AM    COVID19 Not Detected 10/18/2021 12:00 AM           Anesthesia Evaluation  Patient summary reviewed and Nursing notes reviewed   no history of anesthetic complications:   Airway: Mallampati: II  TM distance: >3 FB   Neck ROM: full  Mouth opening: > = 3 FB   Dental: normal exam         Pulmonary:normal exam  breath sounds clear to auscultation  (+)     sleep apnea: on CPAP,                                  Cardiovascular:    (+) hypertension:, dysrhythmias: atrial fibrillation, hyperlipidemia        Rhythm: regular  Rate: normal                    Neuro/Psych:               GI/Hepatic/Renal:   (+) renal disease: CRI          Endo/Other:    (+) DiabetesType II DM.                 Abdominal: normal exam            Vascular:          Other Findings:             Anesthesia Plan      MAC     ASA 3       Induction: intravenous.      Anesthetic plan and risks discussed with patient.      Plan discussed with CRNA.                    Sourav Patel MD   6/10/2024

## 2024-06-10 NOTE — H&P
normal   Extremities: extremities normal, atraumatic, no cyanosis or edema        Findings/Diagnosis: Hx of XRT, rectal bleeding    Plan of Care/Planned Procedure: Colonoscopy    The heart, lungs and mental status were satisfactory for the administration of TIVA sedation and for the procedure by the Anesthesiology team.      Informed consent was obtained for the procedure, including sedation.  Risks of perforation, hemorrhage, adverse drug reaction, and aspiration were discussed.  The risks, benefits and alternatives were again reiterated to the patient to include the risk of infection, bleeding, medication reaction, aspiration, perforation which could require immediate surgery, cardiopulmonary complication, issues with anesthesia and death.    The patient does wish to proceed with the procedure.

## 2024-06-10 NOTE — PROGRESS NOTES
Endoscopy recovery  Patient returned to baseline, vital signs stable (see vital sign flowsheet). Patient offered liquids and tolerated well. Respiratory status within defined limits. Abdomen soft not tender. Skin with in defined limits. Responsible party driving patient home was given the opportunity to ask questions. Patient discharged with documented belongings.     not examined

## 2024-06-10 NOTE — DISCHARGE INSTRUCTIONS
or stercoral colitis, no other features of radiation proctitis noted.  Biopsies taken  -Internal hemorrhoids    Recommendations:   -Await pathology results  -Resume high-fiber diet  -Resume Eliquis tomorrow  -Stop enema if using.  Recommend stool softeners if there is infrequent bowel habit  -Repeat surveillance colonoscopy likely not needed but pending pathology     It was an honor to be your doctor today.  Please let me or my office staff know if you have any feedback about today's procedure.      Varun Feliz MD, FACG    Patient Education on Sedation / Analgesia Administered for Procedure      For 24 hours after general anesthesia or intravenous analgesia / sedation:  Have someone responsible help you with your care  Limit your activities  Do not drive and operate hazardous machinery  Do not make important personal, legal or business decisions  Do not drink alcoholic beverages  If you have not urinated within 8 hours after discharge, please contact your physician  Resume your medications unless otherwise instructed    For 24 hours after general anesthesia or intravenous analgesia / sedation  you may experience:  Drowsiness, dizziness, sleepiness, or confusion  Difficulty remembering or delayed reaction times  Difficulty with your balance, especially while walking, move slowly and carefully, do not make sudden position changes  Difficulty focusing or blurred vision    You may not be aware of slight changes in your behavior and/or your reaction time because of the medication used during and after your procedure.    Report the following to your physician:  Excessive pain, swelling, redness or odor of or around the surgical area  Temperature over 100.5  Nausea and vomiting lasting longer than 4 hours or if unable to take medications  Any signs of decreased circulation or nerve impairment to extremity: change in color, persistent numbness, tingling, coldness or increase pain  Any questions or concerns    IF YOU

## 2024-06-11 NOTE — ANESTHESIA POSTPROCEDURE EVALUATION
Department of Anesthesiology  Postprocedure Note    Patient: Alonso Kruse  MRN: [id number]  YOB: 1955  Date of evaluation: 6/11/2024    Procedure Summary       Date: 06/10/24 Room / Location: [facility] 01 / [facility] ENDOSCOPY    Anesthesia Start: 1452 Anesthesia Stop: 1529    Procedure: COLONOSCOPY Diagnosis:       Personal history of colonic polyps      (Personal history of colonic polyps [Z86.010])    Surgeons: Flo Feliz MD Responsible Provider: Sourav Patel MD    Anesthesia Type: MAC ASA Status: 3            Anesthesia Type: MAC    Augustus Phase I: Augustus Score: 10    Augustus Phase II: Augustus Score: 10    Anesthesia Post Evaluation    Patient location during evaluation: PACU  Patient participation: complete - patient participated  Level of consciousness: awake and alert  Airway patency: patent  Nausea & Vomiting: no nausea and no vomiting  Cardiovascular status: hemodynamically stable  Respiratory status: acceptable  Hydration status: stable        No notable events documented.   Patient:  Malaika Anne                         Date of Assessment: 6/3/2021            Problem Gambling Progress Note and Treatment Plan Review     Attendance  Group/Individual: Phase I.  PT attends group and individual session virtually.     Groups Attended: 8/4/2021, 8/5/2021 and individual counseling session on 8/6/2021.  *No group on 8/3/2021 due to program staffing.    Support group attended this week: no    Reporting sobriety:  yes    Client Treatment Goals:   1. Have the ability to identify and control triggers and urges to padilla.  2. Develop copings skills  3. Rebuild relationships with family and friends.      Treatment Plan     Treatment Plan Review competed on: August 6, 2021    Staff Members contributing:  VANDANA Lord, [facility]                         Received Supervision:  yes    Client: contributed to goals and plan.  Client received copy of plan/revised plan: Yes  Client agrees with plan/revised plan: Yes    Changes to Treatment Plan: No  New Goals added since last review:  No additional goals added at this time.    Goals worked on since last review:  Dimension 1 PT denied any gambling behaviors.  Dimension 2 PT reports following recommendations of medical providers, reporting the ability to navigate the health care system independently.    Dimension 3 PT denies any changes in her mental and emotional health at this time, reporting continued gratitude and working to implement mindfulness. PT attended group identifying stressors and strategies to manage them as well as communicating emotions.    Dimension 4 PT attend group and actively participates in group and treatment processes.    Dimension 5 PT attended group discussion on relapse triggers and factors including strategies to manage them.  PT continues to strengthen her recovery at this time and denies experiencing any urges.   Dimension 6 PT reports continued efforts to gain employment at this time.  PT continues to be encouraged  to attend GA meetings.  PT reports she continues to build on the relationships with her family at this time.    Strategies effective: yes    Strategies need these changes: Build on daily structure and balance, in addition to building on supports in the community.    Depression and Anxiety at Intake:     Patient reported experiencing signs of depression and will talk to a counselor once she has insurance.    Patient's PHQ-9 score was 23  out of 27, indicating severe depression on 05/25/21 and had dropped to 16/27, no longer reporting suicidal ideation at today's eval.  Patient's ADIEL-7 score was 15, indicating severe anxiety on 05/25/21 and dropped to an 8 and showing mild anxiety during today's eval..  Patient denied suicide attempts in the past.      Intake Dimension Ratings:    Dimension 1  0 .    Dimension 2  0 .    Dimension 3  2 .    Dimension 4  1 .    Dimension 5  2 .    Dimension 6  3 .      Guide to Risk Ratings for Suicidality:   IDEATION: Active thoughts of suicide? INTENT: Intent to follow on suicide? PLAN: Plan to follow through on suicide? Level of Risk:   IF Yes Yes Yes Patient = High Emergent   IF Yes Yes No Patient = High Urgent/Non-Emergent   IF Yes No No Patient = Moderate Non-Urgent   IF No No   No Patient = Low Risk   The patient's ADDITIONAL RISK FACTORS and lack of PROTECTIVE FACTORS may increase their overall suicide risk ratings.     Patient's/client's current risk rating:  Low Risk  PT denied any gambling.  Safety Plan on File  No risk identified.    Family Involvement:   INESSA signed    Data:   offered feedback client did actively participate      Intervention:   Counselor feedback  Emotional management  Relapse prevention      Assessment:   Stages of Change Model  Action    Appears/Sounds:  Cooperative  Motivated  Engaged      Plan:  Focus on recovery environment  Monitor emotional/physical health  Continue working towards  Treatment plan goals.    Kristi Camarillo [facility]

## 2024-06-25 ENCOUNTER — OFFICE VISIT (OUTPATIENT)
Age: 69
End: 2024-06-25
Payer: MEDICARE

## 2024-06-25 VITALS
TEMPERATURE: 98.1 F | WEIGHT: 261.2 LBS | HEART RATE: 67 BPM | SYSTOLIC BLOOD PRESSURE: 130 MMHG | DIASTOLIC BLOOD PRESSURE: 85 MMHG | BODY MASS INDEX: 34.62 KG/M2 | OXYGEN SATURATION: 95 % | HEIGHT: 73 IN

## 2024-06-25 DIAGNOSIS — D35.01 BILATERAL ADRENAL ADENOMAS: Primary | ICD-10-CM

## 2024-06-25 DIAGNOSIS — E21.5 PARATHYROID RELATED HYPERCALCEMIA (HCC): ICD-10-CM

## 2024-06-25 DIAGNOSIS — E83.52 PARATHYROID RELATED HYPERCALCEMIA (HCC): ICD-10-CM

## 2024-06-25 DIAGNOSIS — D35.02 BILATERAL ADRENAL ADENOMAS: Primary | ICD-10-CM

## 2024-06-25 PROCEDURE — 3075F SYST BP GE 130 - 139MM HG: CPT | Performed by: INTERNAL MEDICINE

## 2024-06-25 PROCEDURE — G8417 CALC BMI ABV UP PARAM F/U: HCPCS | Performed by: INTERNAL MEDICINE

## 2024-06-25 PROCEDURE — 3017F COLORECTAL CA SCREEN DOC REV: CPT | Performed by: INTERNAL MEDICINE

## 2024-06-25 PROCEDURE — 3079F DIAST BP 80-89 MM HG: CPT | Performed by: INTERNAL MEDICINE

## 2024-06-25 PROCEDURE — 99214 OFFICE O/P EST MOD 30 MIN: CPT | Performed by: INTERNAL MEDICINE

## 2024-06-25 PROCEDURE — 1123F ACP DISCUSS/DSCN MKR DOCD: CPT | Performed by: INTERNAL MEDICINE

## 2024-06-25 PROCEDURE — G8428 CUR MEDS NOT DOCUMENT: HCPCS | Performed by: INTERNAL MEDICINE

## 2024-06-25 PROCEDURE — 1036F TOBACCO NON-USER: CPT | Performed by: INTERNAL MEDICINE

## 2024-06-25 NOTE — PROGRESS NOTES
Alonso Kruse is a 69 y.o. male here for   Chief Complaint   Patient presents with    Bilateral Adrenal Adenomas       1. Have you been to the ER, urgent care clinic since your last visit?  Hospitalized since your last visit? -NO    2. Have you seen or consulted any other health care providers outside of the [facility] since your last visit?  Include any pap smears or colon screening.-NO      
  Orders:  -     Catecholamines, Plasma Fractionated; Future  -     CT ABDOMEN W WO CONTRAST; Future  2. Parathyroid related hypercalcemia (HCC)  Assessment & Plan:  S/p eval with dr holden  Normal Ca for several years (so very unlikely [facility]) then high since summer  PTH elevated so seems PTH mediated  HCTZ was increased right before this which could be the culprit  Check Vit D level as that can contribute if low  In mild cases usually we just watch over time as if over-active parathyroid often difficult to identify; and this is ok as long as low risk bone loss and for kidney stones  Get labs  Decide if need to lower or stop HCTZ to further investigate     CA/PTH NL   Though intermittent mild hypercalcemia   1,25 OH vitamin D, OSVALDO to exclude other causes of mild intermittent hyeprca  Orders:  -     Immunofixation serum profile; Future  -     Vitamin D 1,25 Dihydroxy; Future    Ok with portal for results.     Return in about 4 months (around 10/11/2024).    Thank you for allowing me to participate in the care of this patient.

## 2024-06-25 NOTE — ASSESSMENT & PLAN NOTE
S/p eval with dr holden  Normal Ca for several years (so very unlikely [facility]) then high since summer  PTH elevated so seems PTH mediated  HCTZ was increased right before this which could be the culprit  Check Vit D level as that can contribute if low  In mild cases usually we just watch over time as if over-active parathyroid often difficult to identify; and this is ok as long as low risk bone loss and for kidney stones  Get labs  Decide if need to lower or stop HCTZ to further investigate     CA/PTH NL   Though intermittent mild hypercalcemia   1,25 OH vitamin D, OSVALDO to exclude other causes of mild intermittent hyeprca

## 2024-06-25 NOTE — ASSESSMENT & PLAN NOTE
Adrenal CT demonstrated multiple nodules outlined above   I only have CT 4/2024, but per pt prior Cts dating years back, does not have report.  Asymptomatic clinically  Will hold on ARR as on aldactone    DHEAS low - r/o cushings  Pheo screen not complete  Check plasma met then 1 mg DST.

## 2024-07-02 LAB — 25(OH)D3+25(OH)D2 SERPL-MCNC: 52.8 NG/ML (ref 30–100)

## 2024-07-04 LAB
DOPAMINE SERPL-MCNC: 31 PG/ML (ref 0–48)
EPINEPH PLAS-MCNC: 48 PG/ML (ref 0–62)
NOREPINEPH PLAS-MCNC: 1849 PG/ML (ref 0–874)

## 2024-07-06 LAB
IGA SERPL-MCNC: 229 MG/DL (ref 61–437)
IGG SERPL-MCNC: 1503 MG/DL (ref 603–1613)
IGM SERPL-MCNC: 141 MG/DL (ref 15–143)
PROT PATTERN SERPL IFE-IMP: NORMAL

## 2024-07-15 ENCOUNTER — HOSPITAL ENCOUNTER (OUTPATIENT)
Facility: HOSPITAL | Age: 69
Discharge: HOME | End: 2024-07-18
Attending: INTERNAL MEDICINE
Payer: MEDICARE

## 2024-07-15 DIAGNOSIS — D35.02 BILATERAL ADRENAL ADENOMAS: ICD-10-CM

## 2024-07-15 DIAGNOSIS — D35.01 BILATERAL ADRENAL ADENOMAS: ICD-10-CM

## 2024-07-15 LAB — CREAT BLD-MCNC: 1.4 MG/DL (ref 0.6–1.3)

## 2024-07-15 PROCEDURE — 6360000004 HC RX CONTRAST MEDICATION: Performed by: RADIOLOGY

## 2024-07-15 PROCEDURE — 74170 CT ABD WO CNTRST FLWD CNTRST: CPT

## 2024-07-15 PROCEDURE — 82565 ASSAY OF CREATININE: CPT

## 2024-07-15 RX ADMIN — IOPAMIDOL 100 ML: 755 INJECTION, SOLUTION INTRAVENOUS at 09:46

## 2024-07-18 ENCOUNTER — TELEPHONE (OUTPATIENT)
Age: 69
End: 2024-07-18

## 2024-07-18 NOTE — TELEPHONE ENCOUNTER
Informed pt that Dr. Parikh would like to see him within 3 weeks r/t abnormal labs. Pt stated that he can. I told pt that someone will be reaching out to schedule appt.

## 2024-07-18 NOTE — TELEPHONE ENCOUNTER
----- Message from Racquel Parikh MD sent at 7/18/2024 11:07 AM EDT -----  Abnormal labs - can he see me within 3 weeks?

## 2024-07-28 DIAGNOSIS — I10 ESSENTIAL (PRIMARY) HYPERTENSION: ICD-10-CM

## 2024-07-29 RX ORDER — CARVEDILOL 25 MG/1
TABLET ORAL
Qty: 180 TABLET | Refills: 1 | Status: SHIPPED | OUTPATIENT
Start: 2024-07-29

## 2024-08-07 ENCOUNTER — TELEPHONE (OUTPATIENT)
Age: 69
End: 2024-08-07

## 2024-08-07 ENCOUNTER — OFFICE VISIT (OUTPATIENT)
Age: 69
End: 2024-08-07
Payer: MEDICARE

## 2024-08-07 VITALS
OXYGEN SATURATION: 95 % | DIASTOLIC BLOOD PRESSURE: 80 MMHG | TEMPERATURE: 98.2 F | HEART RATE: 60 BPM | RESPIRATION RATE: 18 BRPM | SYSTOLIC BLOOD PRESSURE: 123 MMHG | HEIGHT: 73 IN | WEIGHT: 262.8 LBS | BODY MASS INDEX: 34.83 KG/M2

## 2024-08-07 DIAGNOSIS — R80.9 TYPE 2 DIABETES MELLITUS WITH MICROALBUMINURIA, WITHOUT LONG-TERM CURRENT USE OF INSULIN (HCC): ICD-10-CM

## 2024-08-07 DIAGNOSIS — E11.29 TYPE 2 DIABETES MELLITUS WITH MICROALBUMINURIA, WITHOUT LONG-TERM CURRENT USE OF INSULIN (HCC): ICD-10-CM

## 2024-08-07 DIAGNOSIS — E66.01 SEVERE OBESITY (HCC): ICD-10-CM

## 2024-08-07 DIAGNOSIS — N18.31 CKD STAGE 3A, GFR 45-59 ML/MIN (HCC): ICD-10-CM

## 2024-08-07 DIAGNOSIS — I10 ESSENTIAL (PRIMARY) HYPERTENSION: ICD-10-CM

## 2024-08-07 DIAGNOSIS — M54.50 CHRONIC BILATERAL LOW BACK PAIN, UNSPECIFIED WHETHER SCIATICA PRESENT: ICD-10-CM

## 2024-08-07 DIAGNOSIS — G89.29 CHRONIC BILATERAL LOW BACK PAIN, UNSPECIFIED WHETHER SCIATICA PRESENT: ICD-10-CM

## 2024-08-07 DIAGNOSIS — D17.22 LIPOMA OF LEFT UPPER EXTREMITY: ICD-10-CM

## 2024-08-07 DIAGNOSIS — Z00.00 MEDICARE ANNUAL WELLNESS VISIT, SUBSEQUENT: Primary | ICD-10-CM

## 2024-08-07 LAB
ALBUMIN SERPL-MCNC: 3.8 G/DL (ref 3.5–5)
ALBUMIN/GLOB SERPL: 1 (ref 1.1–2.2)
ALP SERPL-CCNC: 81 U/L (ref 45–117)
ALT SERPL-CCNC: 41 U/L (ref 12–78)
ANION GAP SERPL CALC-SCNC: 1 MMOL/L (ref 5–15)
AST SERPL-CCNC: 22 U/L (ref 15–37)
BILIRUB SERPL-MCNC: 1 MG/DL (ref 0.2–1)
BUN SERPL-MCNC: 22 MG/DL (ref 6–20)
BUN/CREAT SERPL: 17 (ref 12–20)
CALCIUM SERPL-MCNC: 10.3 MG/DL (ref 8.5–10.1)
CHLORIDE SERPL-SCNC: 103 MMOL/L (ref 97–108)
CHOLEST SERPL-MCNC: 108 MG/DL
CO2 SERPL-SCNC: 32 MMOL/L (ref 21–32)
CREAT SERPL-MCNC: 1.33 MG/DL (ref 0.7–1.3)
EST. AVERAGE GLUCOSE BLD GHB EST-MCNC: 137 MG/DL
GLOBULIN SER CALC-MCNC: 3.7 G/DL (ref 2–4)
GLUCOSE SERPL-MCNC: 126 MG/DL (ref 65–100)
HBA1C MFR BLD: 6.4 % (ref 4–5.6)
HDLC SERPL-MCNC: 42 MG/DL
HDLC SERPL: 2.6 (ref 0–5)
LDLC SERPL CALC-MCNC: 49.2 MG/DL (ref 0–100)
POTASSIUM SERPL-SCNC: 4.1 MMOL/L (ref 3.5–5.1)
PROT SERPL-MCNC: 7.5 G/DL (ref 6.4–8.2)
SODIUM SERPL-SCNC: 136 MMOL/L (ref 136–145)
TRIGL SERPL-MCNC: 84 MG/DL
VLDLC SERPL CALC-MCNC: 16.8 MG/DL

## 2024-08-07 PROCEDURE — G8427 DOCREV CUR MEDS BY ELIG CLIN: HCPCS | Performed by: PHYSICIAN ASSISTANT

## 2024-08-07 PROCEDURE — G8417 CALC BMI ABV UP PARAM F/U: HCPCS | Performed by: PHYSICIAN ASSISTANT

## 2024-08-07 PROCEDURE — 3079F DIAST BP 80-89 MM HG: CPT | Performed by: PHYSICIAN ASSISTANT

## 2024-08-07 PROCEDURE — 3074F SYST BP LT 130 MM HG: CPT | Performed by: PHYSICIAN ASSISTANT

## 2024-08-07 PROCEDURE — 99214 OFFICE O/P EST MOD 30 MIN: CPT | Performed by: PHYSICIAN ASSISTANT

## 2024-08-07 PROCEDURE — 3017F COLORECTAL CA SCREEN DOC REV: CPT | Performed by: PHYSICIAN ASSISTANT

## 2024-08-07 PROCEDURE — 3044F HG A1C LEVEL LT 7.0%: CPT | Performed by: PHYSICIAN ASSISTANT

## 2024-08-07 PROCEDURE — 1036F TOBACCO NON-USER: CPT | Performed by: PHYSICIAN ASSISTANT

## 2024-08-07 PROCEDURE — 1123F ACP DISCUSS/DSCN MKR DOCD: CPT | Performed by: PHYSICIAN ASSISTANT

## 2024-08-07 PROCEDURE — G0439 PPPS, SUBSEQ VISIT: HCPCS | Performed by: PHYSICIAN ASSISTANT

## 2024-08-07 PROCEDURE — 2022F DILAT RTA XM EVC RTNOPTHY: CPT | Performed by: PHYSICIAN ASSISTANT

## 2024-08-07 RX ORDER — HYDROCHLOROTHIAZIDE 25 MG/1
25 TABLET ORAL EVERY MORNING
Qty: 90 TABLET | Refills: 1 | Status: SHIPPED | OUTPATIENT
Start: 2024-08-07

## 2024-08-07 SDOH — ECONOMIC STABILITY: FOOD INSECURITY: WITHIN THE PAST 12 MONTHS, YOU WORRIED THAT YOUR FOOD WOULD RUN OUT BEFORE YOU GOT MONEY TO BUY MORE.: NEVER TRUE

## 2024-08-07 SDOH — ECONOMIC STABILITY: HOUSING INSECURITY
IN THE LAST 12 MONTHS, WAS THERE A TIME WHEN YOU DID NOT HAVE A STEADY PLACE TO SLEEP OR SLEPT IN A SHELTER (INCLUDING NOW)?: NO

## 2024-08-07 SDOH — ECONOMIC STABILITY: FOOD INSECURITY: WITHIN THE PAST 12 MONTHS, THE FOOD YOU BOUGHT JUST DIDN'T LAST AND YOU DIDN'T HAVE MONEY TO GET MORE.: NEVER TRUE

## 2024-08-07 SDOH — ECONOMIC STABILITY: INCOME INSECURITY: HOW HARD IS IT FOR YOU TO PAY FOR THE VERY BASICS LIKE FOOD, HOUSING, MEDICAL CARE, AND HEATING?: NOT HARD AT ALL

## 2024-08-07 ASSESSMENT — PATIENT HEALTH QUESTIONNAIRE - PHQ9
SUM OF ALL RESPONSES TO PHQ QUESTIONS 1-9: 0
2. FEELING DOWN, DEPRESSED OR HOPELESS: NOT AT ALL
1. LITTLE INTEREST OR PLEASURE IN DOING THINGS: NOT AT ALL
SUM OF ALL RESPONSES TO PHQ9 QUESTIONS 1 & 2: 0

## 2024-08-07 ASSESSMENT — LIFESTYLE VARIABLES
HOW OFTEN DO YOU HAVE A DRINK CONTAINING ALCOHOL: NEVER
HOW MANY STANDARD DRINKS CONTAINING ALCOHOL DO YOU HAVE ON A TYPICAL DAY: PATIENT DOES NOT DRINK

## 2024-08-07 ASSESSMENT — ENCOUNTER SYMPTOMS
COUGH: 0
SHORTNESS OF BREATH: 0

## 2024-08-07 NOTE — TELEPHONE ENCOUNTER
Attempted to contact patient regarding referral for lipoma of upper left extremity.  Patient did not answer, LVM requesting a return call if interested in scheduling. Will follow up.

## 2024-08-07 NOTE — PATIENT INSTRUCTIONS
secondhand smoke too.     Stay at a weight that's healthy for you. Talk to your doctor if you need help losing weight.     Try to get 7 to 9 hours of sleep each night.     Limit alcohol to 2 drinks a day for men and 1 drink a day for women. Too much alcohol can cause health problems.     Manage other health problems such as diabetes, high blood pressure, and high cholesterol. If you think you may have a problem with alcohol or drug use, talk to your doctor.   Medicines    Take your medicines exactly as prescribed. Call your doctor if you think you are having a problem with your medicine.     If your doctor recommends aspirin, take the amount directed each day. Make sure you take aspirin and not another kind of pain reliever, such as acetaminophen (Tylenol).   When should you call for help?   Call 911 if you have symptoms of a heart attack. These may include:    Chest pain or pressure, or a strange feeling in the chest.     Sweating.     Shortness of breath.     Pain, pressure, or a strange feeling in the back, neck, jaw, or upper belly or in one or both shoulders or arms.     Lightheadedness or sudden weakness.     A fast or irregular heartbeat.   After you call 911, the  may tell you to chew 1 adult-strength or 2 to 4 low-dose aspirin. Wait for an ambulance. Do not try to drive yourself.  Watch closely for changes in your health, and be sure to contact your doctor if you have any problems.  Where can you learn more?  Go to https://www.[employer].net/patientEd and enter F075 to learn more about \"A Healthy Heart: Care Instructions.\"  Current as of: June 24, 2023  Content Version: 14.1  © 2006-2024 [employer].   Care instructions adapted under license by [employer]. If you have questions about a medical condition or this instruction, always ask your healthcare professional. [employer] disclaims any warranty or liability for your use of this information.      Personalized

## 2024-08-07 NOTE — PROGRESS NOTES
Alonso Kruse is a 69 y.o. male  Chief Complaint   Patient presents with    Medicare AWV     Pt has concerns with back and wants a referral to PT;     Vitals:    08/07/24 0913   BP: 123/80   Pulse: 60   Resp: 18   Temp: 98.2 °F (36.8 °C)   SpO2: 95%      Wt Readings from Last 20 Encounters:   08/07/24 119.2 kg (262 lb 12.8 oz)   06/25/24 118.5 kg (261 lb 3.2 oz)   06/10/24 119.7 kg (264 lb)   05/31/24 117.9 kg (260 lb)   05/10/24 118.3 kg (260 lb 12.8 oz)   05/07/24 117.9 kg (260 lb)   04/30/24 118.4 kg (261 lb)   04/15/24 115.7 kg (255 lb)   02/19/24 118.8 kg (262 lb)   02/07/24 118.8 kg (262 lb)   01/19/24 117 kg (258 lb)   01/09/24 117 kg (258 lb)   12/04/23 117.9 kg (260 lb)   11/28/23 117.5 kg (259 lb)   10/03/23 117.9 kg (260 lb)   09/07/23 120.2 kg (265 lb)   08/29/23 120.5 kg (265 lb 9.6 oz)   07/31/23 123.1 kg (271 lb 6.4 oz)   05/30/23 123.3 kg (271 lb 12.8 oz)   05/17/23 125.6 kg (277 lb)   Pt has been losing weight this year. Exercising regularly!     BMI Readings from Last 3 Encounters:   08/07/24 34.67 kg/m²   06/25/24 34.46 kg/m²   06/10/24 34.83 kg/m²     Health Maintenance Due   Topic Date Due    Diabetic retinal exam  Never done    DTaP/Tdap/Td vaccine (1 - Tdap) Never done    Respiratory Syncytial Virus (RSV) Pregnant or age 60 yrs+ (1 - 1-dose 60+ series) Never done    Shingles vaccine (2 of 2) 10/05/2021    Prostate Specific Antigen (PSA) Screening or Monitoring  01/05/2022    Pneumococcal 65+ years Vaccine (2 of 2 - PCV) 10/18/2022    Diabetic foot exam  06/14/2023    COVID-19 Vaccine (5 - 2023-24 season) 12/20/2023    Flu vaccine (1) Never done     HPI  Chronic back pain - seeing pain management regularly. Would like to do physical therapy.     L lower arm with lipoma x years. Would like to have it removed. Not painful, but it is bothersome.     DM II - controlled. Feeling well.    Hemoglobin A1C (%)   Date Value   04/17/2024 6.8 (H)     Lab Results   Component Value Date    LDL 47 
I have reviewed all needed documentation in preparation for visit. Verified patient by name and date of birth  Chief Complaint   Patient presents with    Medicare AWV     Pt has concerns with back and wants a referral to PT;       Vitals:    08/07/24 0913   BP: 123/80   Site: Right Upper Arm   Position: Sitting   Cuff Size: Medium Adult   Pulse: 60   Resp: 18   Temp: 98.2 °F (36.8 °C)   TempSrc: Temporal   SpO2: 95%   Weight: 119.2 kg (262 lb 12.8 oz)   Height: 1.854 m (6' 1\")       Health Maintenance Due   Topic Date Due    Diabetic retinal exam  Never done    DTaP/Tdap/Td vaccine (1 - Tdap) Never done    Respiratory Syncytial Virus (RSV) Pregnant or age 60 yrs+ (1 - 1-dose 60+ series) Never done    Shingles vaccine (2 of 2) 10/05/2021    Prostate Specific Antigen (PSA) Screening or Monitoring  01/05/2022    Pneumococcal 65+ years Vaccine (2 of 2 - PCV) 10/18/2022    Diabetic foot exam  06/14/2023    COVID-19 Vaccine (5 - 2023-24 season) 12/20/2023    Annual Wellness Visit (Medicare)  04/03/2024    Flu vaccine (1) Never done     \"Have you been to the ER, urgent care clinic since your last visit?  Hospitalized since your last visit?\"    NO    “Have you seen or consulted any other health care providers outside of [facility] since your last visit?”    YES - When: approximately 3 months ago.  Where and Why: Hepatologist at [facility] .            Click Here for Release of Records Request         ALEX Grayson  
MD Perri   ELIQUIS 5 MG TABS tablet TAKE 1 TABLET BY MOUTH TWICE A DAY Yes Guerline Steiner PA-C   spironolactone (ALDACTONE) 25 MG tablet Take 1 tablet by mouth daily Yes Kt Sotelo MD   hydroCHLOROthiazide (HYDRODIURIL) 50 MG tablet TAKE 1 TABLET BY MOUTH EVERY DAY Yes Guerline Steiner PA-C   rosuvastatin (CRESTOR) 20 MG tablet Take 1 tablet by mouth daily Yes Guerline Steiner PA-C   hydrALAZINE (APRESOLINE) 100 MG tablet TAKE 1 TABLET BY MOUTH THREE TIMES A DAY WITH FOOD Yes Kt Sotelo MD   flecainide (TAMBOCOR) 100 MG tablet Take 1 tablet by mouth as needed Yes Kt Sotelo MD   amLODIPine (NORVASC) 10 MG tablet Take 1 tablet by mouth daily Yes Automatic Reconciliation, Ar   traMADol (ULTRAM) 50 MG tablet Take 1 tablet by mouth as needed. Yes Automatic Reconciliation, Ar   clotrimazole-betamethasone (LOTRISONE) 1-0.05 % cream APPLY TO AFFECTED AREA TWICE A DAY  Patient not taking: Reported on 8/7/2024  Guerline Steiner PA-C   tamsulosin (FLOMAX) 0.4 MG capsule   Kt Sotelo MD   Vitamin D3 125 MCG (5000 UT) TABS tablet Take 1 tablet by mouth daily  Patient not taking: Reported on 8/7/2024  Kt Sotelo MD       CareTeam (Including outside providers/suppliers regularly involved in providing care):   Patient Care Team:  Guerline Steiner PA-C as PCP - General  Guerline Steiner PA-C as PCP - Empaneled Provider  David Tobin MD (Urology)  Arun Grant MD (Endocrinology)  Cait Rich MD as Physician (Endocrinology)      Reviewed and updated this visit:  Tobacco  Allergies  Meds  Problems  Med Hx  Surg Hx  Soc Hx  Fam Hx

## 2024-08-08 ENCOUNTER — OFFICE VISIT (OUTPATIENT)
Age: 69
End: 2024-08-08
Payer: MEDICARE

## 2024-08-08 VITALS
HEART RATE: 65 BPM | TEMPERATURE: 98.3 F | BODY MASS INDEX: 34.59 KG/M2 | WEIGHT: 261 LBS | OXYGEN SATURATION: 95 % | DIASTOLIC BLOOD PRESSURE: 80 MMHG | HEIGHT: 73 IN | SYSTOLIC BLOOD PRESSURE: 141 MMHG

## 2024-08-08 DIAGNOSIS — D35.01 BILATERAL ADRENAL ADENOMAS: Primary | ICD-10-CM

## 2024-08-08 DIAGNOSIS — E21.5 PARATHYROID RELATED HYPERCALCEMIA (HCC): ICD-10-CM

## 2024-08-08 DIAGNOSIS — E83.52 PARATHYROID RELATED HYPERCALCEMIA (HCC): ICD-10-CM

## 2024-08-08 DIAGNOSIS — D35.01 BILATERAL ADRENAL ADENOMAS: ICD-10-CM

## 2024-08-08 DIAGNOSIS — D35.02 BILATERAL ADRENAL ADENOMAS: Primary | ICD-10-CM

## 2024-08-08 DIAGNOSIS — D35.02 BILATERAL ADRENAL ADENOMAS: ICD-10-CM

## 2024-08-08 PROCEDURE — 1123F ACP DISCUSS/DSCN MKR DOCD: CPT | Performed by: INTERNAL MEDICINE

## 2024-08-08 PROCEDURE — 3079F DIAST BP 80-89 MM HG: CPT | Performed by: INTERNAL MEDICINE

## 2024-08-08 PROCEDURE — 99214 OFFICE O/P EST MOD 30 MIN: CPT | Performed by: INTERNAL MEDICINE

## 2024-08-08 PROCEDURE — 3017F COLORECTAL CA SCREEN DOC REV: CPT | Performed by: INTERNAL MEDICINE

## 2024-08-08 PROCEDURE — 3077F SYST BP >= 140 MM HG: CPT | Performed by: INTERNAL MEDICINE

## 2024-08-08 PROCEDURE — G8417 CALC BMI ABV UP PARAM F/U: HCPCS | Performed by: INTERNAL MEDICINE

## 2024-08-08 PROCEDURE — 1036F TOBACCO NON-USER: CPT | Performed by: INTERNAL MEDICINE

## 2024-08-08 PROCEDURE — G8427 DOCREV CUR MEDS BY ELIG CLIN: HCPCS | Performed by: INTERNAL MEDICINE

## 2024-08-08 NOTE — PROGRESS NOTES
Endocrine Follow up     PCP: Guerline Steiner PA-C    Chief Complaint   Patient presents with    Bilateral Adrenal Adenomas    Parathyroid related Hypercalcemia     HPI:  Alonso Kruse is a 69 y.o. male with  has a past medical history of Atrial fibrillation (HCC), Diabetes mellitus (HCC), Hyperlipidemia, Hypertension, Prostate cancer (HCC), and Sleep apnea. Here for follow up of adrenal adenoma and hypercalcemia.     Prostate ca s/p XRT     Patient of Dr. Rich, last visit 1/2024  Saw dr rich for hypercalcemia   \"Normal Ca for several years (so very unlikely [facility]) then high since summer  PTH elevated so seems PTH mediated  HCTZ was increased right before this which could be the culprit  Check Vit D level as that can contribute if low  In mild cases usually we just watch over time as if over-active parathyroid often difficult to identify; and this is ok as long as low risk bone loss and for kidney stones  Get labs  Decide if need to lower or stop HCTZ to further investigate   US does not show any obvious parathyroid  Thyroid gland is normal in size and texture and without any nodules   TSH is normal too\"    Adrenal nodule - per pt dx 9 years ago   Had repeat CT 2020 - no reports here   Only CT report I have here is from 4/19/24    Adrenal CT/MRI: 4/2024  Multifocal nodularity of the left adrenal gland   Superior nodule measuring 31 mm, 17 HU  inferior nodule measuring 19 mm,  8 HU  Right adrenal gland  18 mm nodule measuring -15 HU  low-density lesions at the lower pole of the left adrenal gland and in the right  adrenal gland are consistent with lipid rich adenomas. The upper left adrenal  nodule is indeterminate by density.    No   Rapid weight gain, buffalo hump, proximal weakness, easy bruising, acne, striae, skin thinning,   irritability, depression, hyperpigmentation    Feeling well today   Reviewed labs + imaging today     BRIEF ROS   Gen: no fevers/chills  CV: no chest pain  Resp: no shortness of breath,

## 2024-08-08 NOTE — PROGRESS NOTES
Alonso Kruse is a 69 y.o. male here for   Chief Complaint   Patient presents with    Bilateral Adrenal Adenomas    Parathyroid related Hypercalcemia       1. Have you been to the ER, urgent care clinic since your last visit?  Hospitalized since your last visit? -NO    2. Have you seen or consulted any other health care providers outside of the [facility] since your last visit?  Include any pap smears or colon screening.-NO

## 2024-08-09 DIAGNOSIS — E78.00 HYPERCHOLESTEREMIA: ICD-10-CM

## 2024-08-09 RX ORDER — ROSUVASTATIN CALCIUM 20 MG/1
20 TABLET, COATED ORAL DAILY
Qty: 90 TABLET | Refills: 1 | Status: SHIPPED | OUTPATIENT
Start: 2024-08-09

## 2024-08-09 NOTE — TELEPHONE ENCOUNTER
Refill request received from [facility]    for   Requested Prescriptions     Pending Prescriptions Disp Refills    rosuvastatin (CRESTOR) 20 MG tablet [Pharmacy Med Name: ROSUVASTATIN CALCIUM 20 MG TAB] 90 tablet 1     Sig: TAKE 1 TABLET BY MOUTH EVERY DAY     Last office visit: 8/7/2024   Next office visit: Visit date not found     Routed to KIMBERLY Bronson for review.     Brea Wu LPN

## 2024-08-11 DIAGNOSIS — E87.6 HYPOKALEMIA: ICD-10-CM

## 2024-08-12 ENCOUNTER — OFFICE VISIT (OUTPATIENT)
Age: 69
End: 2024-08-12
Payer: MEDICARE

## 2024-08-12 VITALS
DIASTOLIC BLOOD PRESSURE: 68 MMHG | TEMPERATURE: 98.6 F | SYSTOLIC BLOOD PRESSURE: 118 MMHG | OXYGEN SATURATION: 95 % | HEIGHT: 73 IN | WEIGHT: 261 LBS | RESPIRATION RATE: 18 BRPM | HEART RATE: 72 BPM | BODY MASS INDEX: 34.59 KG/M2

## 2024-08-12 DIAGNOSIS — R22.9 SUBCUTANEOUS MASS: Primary | ICD-10-CM

## 2024-08-12 PROCEDURE — 99203 OFFICE O/P NEW LOW 30 MIN: CPT | Performed by: SURGERY

## 2024-08-12 PROCEDURE — 1123F ACP DISCUSS/DSCN MKR DOCD: CPT | Performed by: SURGERY

## 2024-08-12 PROCEDURE — 3074F SYST BP LT 130 MM HG: CPT | Performed by: SURGERY

## 2024-08-12 PROCEDURE — G8427 DOCREV CUR MEDS BY ELIG CLIN: HCPCS | Performed by: SURGERY

## 2024-08-12 PROCEDURE — 3017F COLORECTAL CA SCREEN DOC REV: CPT | Performed by: SURGERY

## 2024-08-12 PROCEDURE — 3078F DIAST BP <80 MM HG: CPT | Performed by: SURGERY

## 2024-08-12 PROCEDURE — 1036F TOBACCO NON-USER: CPT | Performed by: SURGERY

## 2024-08-12 PROCEDURE — G8417 CALC BMI ABV UP PARAM F/U: HCPCS | Performed by: SURGERY

## 2024-08-12 RX ORDER — POTASSIUM CHLORIDE 750 MG/1
10 TABLET, EXTENDED RELEASE ORAL DAILY
Qty: 90 TABLET | Refills: 2 | Status: SHIPPED | OUTPATIENT
Start: 2024-08-12

## 2024-08-12 ASSESSMENT — PATIENT HEALTH QUESTIONNAIRE - PHQ9
2. FEELING DOWN, DEPRESSED OR HOPELESS: NOT AT ALL
SUM OF ALL RESPONSES TO PHQ QUESTIONS 1-9: 0
1. LITTLE INTEREST OR PLEASURE IN DOING THINGS: NOT AT ALL
SUM OF ALL RESPONSES TO PHQ9 QUESTIONS 1 & 2: 0
SUM OF ALL RESPONSES TO PHQ QUESTIONS 1-9: 0

## 2024-08-12 NOTE — PROGRESS NOTES
Identified patient with two patient identifiers (name and ). Reviewed chart in preparation for visit and have obtained necessary documentation.    Alonso Kruse is a 69 y.o. male  Chief Complaint   Patient presents with    New Patient    Skin Problem     Left Upper Extremity Lipoma      /68 (Site: Right Upper Arm, Position: Sitting, Cuff Size: Medium Adult)   Pulse 72   Temp 98.6 °F (37 °C) (Oral)   Resp 18   Ht 1.854 m (6' 1\")   Wt 118.4 kg (261 lb)   SpO2 95%   BMI 34.43 kg/m²     1. Have you been to the ER, urgent care clinic since your last visit?  Hospitalized since your last visit?no    2. Have you seen or consulted any other health care providers outside of the [facility] since your last visit?  Include any pap smears or colon screening. no
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
## 2024-08-13 ENCOUNTER — PREP FOR PROCEDURE (OUTPATIENT)
Age: 69
End: 2024-08-13

## 2024-08-14 ENCOUNTER — TELEPHONE (OUTPATIENT)
Age: 69
End: 2024-08-14

## 2024-08-14 NOTE — TELEPHONE ENCOUNTER
LEFT VM FOR PT [facility]

## 2024-08-14 NOTE — TELEPHONE ENCOUNTER
Per Sunni please move Dr. Krueger 8/19 3pm pt to 8/20 @ 1:15p or 8/21 @ 1:15p. Extend appointment length. Pt has been moved to 8/20/24 @ 1:15 pm

## 2024-08-17 LAB
CREAT 24H UR-MRATE: 2369 MG/24 HR (ref 1000–2000)
CREAT UR-MCNC: 98.7 MG/DL

## 2024-08-20 ENCOUNTER — PROCEDURE VISIT (OUTPATIENT)
Age: 69
End: 2024-08-20
Payer: MEDICARE

## 2024-08-20 ENCOUNTER — HOSPITAL ENCOUNTER (OUTPATIENT)
Facility: HOSPITAL | Age: 69
Setting detail: SPECIMEN
Discharge: HOME | End: 2024-08-23

## 2024-08-20 VITALS
SYSTOLIC BLOOD PRESSURE: 125 MMHG | HEIGHT: 73 IN | RESPIRATION RATE: 18 BRPM | BODY MASS INDEX: 34.46 KG/M2 | DIASTOLIC BLOOD PRESSURE: 76 MMHG | TEMPERATURE: 98.1 F | HEART RATE: 59 BPM | OXYGEN SATURATION: 93 % | WEIGHT: 260 LBS

## 2024-08-20 DIAGNOSIS — R22.32 MASS OF LEFT FOREARM: ICD-10-CM

## 2024-08-20 DIAGNOSIS — R22.32 MASS OF LEFT FOREARM: Primary | ICD-10-CM

## 2024-08-20 LAB
CALCIUM 24H UR-MCNC: 4.1 MG/DL
CALCIUM 24H UR-MRATE: 94 MG/24 HR (ref 0–320)

## 2024-08-20 PROCEDURE — 25075 EXC FOREARM LES SC < 3 CM: CPT | Performed by: SURGERY

## 2024-08-20 ASSESSMENT — PATIENT HEALTH QUESTIONNAIRE - PHQ9
SUM OF ALL RESPONSES TO PHQ QUESTIONS 1-9: 0
SUM OF ALL RESPONSES TO PHQ QUESTIONS 1-9: 0
2. FEELING DOWN, DEPRESSED OR HOPELESS: NOT AT ALL
SUM OF ALL RESPONSES TO PHQ QUESTIONS 1-9: 0
SUM OF ALL RESPONSES TO PHQ QUESTIONS 1-9: 0
1. LITTLE INTEREST OR PLEASURE IN DOING THINGS: NOT AT ALL
SUM OF ALL RESPONSES TO PHQ9 QUESTIONS 1 & 2: 0

## 2024-08-20 NOTE — PROGRESS NOTES
Identified patient with two patient identifiers (name and ). Reviewed chart in preparation for visit and have obtained necessary documentation.    Alonso Kruse is a 69 y.o. male  Chief Complaint   Patient presents with    Procedure     Excision of Left Forearm Mass     /76 (Site: Right Upper Arm, Position: Sitting, Cuff Size: Medium Adult)   Pulse 59   Temp 98.1 °F (36.7 °C) (Oral)   Resp 18   Ht 1.854 m (6' 1\")   Wt 117.9 kg (260 lb)   SpO2 93%   BMI 34.30 kg/m²     1. Have you been to the ER, urgent care clinic since your last visit?  Hospitalized since your last visit?no    2. Have you seen or consulted any other health care providers outside of the [facility] since your last visit?  Include any pap smears or colon screening. no

## 2024-08-20 NOTE — PROGRESS NOTES
PROCEDURE NOTE    Date of Procedure: 8/20/2024     Preoperative Diagnosis: Left forearm mass  Postoperative Diagnosis: same    Procedure: Left forearm mass    Surgeon: Hawk Krueger MD    Surgical Staff: ENDER Lakhani    Anesthesia: 5 cc of 1% lidocaine with 1/100,000 epinephrine    Indications: 69-year-old male with painful, enlarging left forearm mass    Findings: Left subcutaneous cystic like structure.  Approximate size 2 cm    Description of Operation: Alonso Kruse was identified. Informed consent was obtained after a complete discussion of risks, benefits and alternatives to surgery were had with the patient.    The patient was then prepped and draped in the usual sterile fashion. The patient was injected with local anesthesia.     I made a 3.5 cm longitudinal incision with a 15 blade over the ulnar aspect of the forearm.  I dissected bluntly through subcutaneous tissue with a hemostat.  I localized the cystic, dark-colored lesion and elevated it.  I used scissors to transect the stalk free from the surrounding subcutaneous tissue.  I passed off the specimen.  Specimen was roughly 2 cm. I then closed the deep dermal layer with multiple interrupted 2-0 Vicryl followed by a running 4-0 Monocryl subcuticular stitch.  I applied glue.    At the end of the procedure all instrument, needle, and sponge counts were correct.      Estimated Blood Loss: 5 mL    Specimens: Left forearm mass    Complications: None    Implants: None      Hawk Krueger MD, [facility], [facility]  Bariatric and General Surgeon  [facility]  8/20/2024

## 2024-08-23 LAB
METANEPH FREE SERPL-MCNC: <25 PG/ML (ref 0–88)
NORMETANEPHRINE SERPL-MCNC: 61.3 PG/ML (ref 0–285.2)

## 2024-08-26 ENCOUNTER — HOSPITAL ENCOUNTER (OUTPATIENT)
Facility: HOSPITAL | Age: 69
Setting detail: RECURRING SERIES
Discharge: HOME | End: 2024-08-29
Payer: MEDICARE

## 2024-08-26 PROCEDURE — 97161 PT EVAL LOW COMPLEX 20 MIN: CPT | Performed by: PHYSICAL THERAPIST

## 2024-08-26 PROCEDURE — 97110 THERAPEUTIC EXERCISES: CPT | Performed by: PHYSICAL THERAPIST

## 2024-08-26 NOTE — THERAPY EVALUATION
Physical Therapy at [facility],   a part of [facility]  [address], [place] [address]  Phone:[phone] Fax:[phone]                                                                            PHYSICAL THERAPY - MEDICARE EVALUATION/PLAN OF CARE NOTE (updated 3/23)      Date: 2024          Patient Name:  Alonso Kruse :  1955   Medical   Diagnosis:  Chronic bilateral low back pain, unspecified whether sciatica present [M54.50, G89.29] Treatment Diagnosis:  M54.59, G89.29  CHRONIC LOWER BACK PAIN    Referral Source:  Guerline Steiner PA-C Provider #:  [id number]                  Insurance: Payor: MEDICARE / Plan: MEDICARE PART A AND B / Product Type: *No Product type* /      Patient  verified yes     Visit #   Current  / Total 1    Time   In / Out 1200 PM 1255  PM   Total Treatment Time 55   Total Timed Codes 25   1:1 Treatment Time 25      [facility] Totals Reminder:  bill using total billable   min of TIMED therapeutic procedures and modalities.   8-22 min = 1 unit; 23-37 min = 2 units; 38-52 min = 3 units;  53-67 min = 4 units; 68-82 min = 5 units           SUBJECTIVE  Pain Level (0-10 scale): current 0 worst 5   []constant [x]intermittent [x]improving []worsening []no change since onset    Any medication changes, allergies to medications, adverse drug reactions, diagnosis change, or new procedure performed?: [x] No    [] Yes (see summary sheet for update)  Medications: Verified on Patient Summary List    Subjective functional status/changes:     Patient referred to PT with a chief complaint of back pain. Reports pain started several years ago. Had lumbar surgery around 2016 laminectomy. Had second lumbar laminectomy in . Also has had ablations which has helped some. He swims and notices when he gets out of the pool muscles in low back tighten up. Swims 30-40 minutes freestyle 3 days per week. [place] 3 days per [place] and certify that this therapy is necessary.    Physician's Signature:_________________________   DATE:_________   TIME:________                           Guerline Steiner PA-C    ** Signature, Date and Time must be completed for valid certification **  Please sign and fax to [phone].  Thank you

## 2024-08-29 ENCOUNTER — HOSPITAL ENCOUNTER (OUTPATIENT)
Facility: HOSPITAL | Age: 69
Setting detail: RECURRING SERIES
End: 2024-08-29
Payer: MEDICARE

## 2024-08-29 PROCEDURE — 97110 THERAPEUTIC EXERCISES: CPT

## 2024-08-29 NOTE — PROGRESS NOTES
PHYSICAL THERAPY - MEDICARE DAILY TREATMENT NOTE (updated 3/23)      Date: 2024          Patient Name:  Alonso Kruse :  1955   Medical   Diagnosis:  Chronic bilateral low back pain, unspecified whether sciatica present [M54.50, G89.29] Treatment Diagnosis:  M54.59, G89.29  CHRONIC LOWER BACK PAIN    Referral Source:  Guerline Steiner PA-C Insurance:   Payor: MEDICARE / Plan: MEDICARE PART A AND B / Product Type: *No Product type* /                     Patient  verified yes     Visit #   Current  / Total 2    Time   In / Out 7:30 A 8:10 A   Total Treatment Time 40   Total Timed Codes 40   1:1 Treatment Time 30      [facility] Totals Reminder:  bill using total billable   min of TIMED therapeutic procedures and modalities.   8-22 min = 1 unit; 23-37 min = 2 units; 38-52 min = 3 units; 53-67 min = 4 units; 68-82 min = 5 units            SUBJECTIVE    Pain Level (0-10 scale): 0    Any medication changes, allergies to medications, adverse drug reactions, diagnosis change, or new procedure performed?: [x] No    [] Yes (see summary sheet for update)  Medications: Verified on Patient Summary List    Subjective functional status/changes:     Pt reports he has tried the exercises at home and was unsure if he was doing TrA contractions right or not.     OBJECTIVE      Therapeutic Procedures:  Tx Min Billable or 1:1 Min (if diff from Tx Min) Procedure, Rationale, Specifics   40 30 79227 Therapeutic Exercise (timed):  increase ROM, strength, coordination, balance, and proprioception to improve patient's ability to progress to PLOF and address remaining functional goals. (see flow sheet as applicable)     Details if applicable:       74590 Manual Therapy (timed):  decrease pain, increase ROM, and increase tissue extensibility to improve patient's ability to progress to PLOF and address remaining functional goals.  The manual therapy interventions were performed at a separate and distinct time from the therapeutic  activities interventions . (see flow sheet as applicable)    Details if applicable:           Details if applicable:            Details if applicable:     40 30    Total Total         Modalities Rationale:     decrease pain and increase tissue extensibility to improve patient's ability to progress to PLOF and address remaining functional goals.       min [] Estim Unattended,             type/location:       []  w/ice    []  w/heat        min [] Estim Attended,             type/location:       []  w/ice   []  w/heat         []  w/US   []  TENS insruct            min []  Mechanical Traction,        type/lbs:        []  pro      []  sup           []  int       []  cont            []  before manual           []  after manual     min []  Ultrasound,         settings/location:     decl min  unbilled []  Ice     []  Heat            location/position:         min []  Vasopneumatic Device,      press/temp:   pre-treatment girth :    post-treatment girth :    measured at (landmark       location) :   If using vaso (only need to measure limb vaso being performed on)        min []  Other:      Skin assessment post-treatment (if applicable):    [x]  intact    []  redness- no adverse reaction                 []redness - adverse reaction:          [x]  Patient Education billed concurrently with other procedures   [x] Review HEP    [] Progressed/Changed HEP, detail:    [] Other detail:         Other Objective/Functional Measures  NT    Pain Level at end of session (0-10 scale): 0      Assessment   Able to coordinate TrA contraction with cues for form. Good tolerance to exercises with decreased lumbar spine stiffness following.   Patient will continue to benefit from skilled PT / OT services to modify and progress therapeutic interventions, analyze and address functional mobility deficits, analyze and address ROM deficits, analyze and address strength deficits, analyze and cue for proper movement patterns, and analyze and modify for

## 2024-09-03 ENCOUNTER — HOSPITAL ENCOUNTER (OUTPATIENT)
Facility: HOSPITAL | Age: 69
Setting detail: RECURRING SERIES
Discharge: HOME | End: 2024-09-06
Payer: MEDICARE

## 2024-09-03 ENCOUNTER — OFFICE VISIT (OUTPATIENT)
Age: 69
End: 2024-09-03

## 2024-09-03 VITALS
TEMPERATURE: 97.9 F | WEIGHT: 265 LBS | OXYGEN SATURATION: 95 % | BODY MASS INDEX: 35.12 KG/M2 | DIASTOLIC BLOOD PRESSURE: 68 MMHG | HEART RATE: 74 BPM | SYSTOLIC BLOOD PRESSURE: 107 MMHG | RESPIRATION RATE: 18 BRPM | HEIGHT: 73 IN

## 2024-09-03 DIAGNOSIS — Z98.890 POST-OPERATIVE STATE: Primary | ICD-10-CM

## 2024-09-03 PROCEDURE — 99024 POSTOP FOLLOW-UP VISIT: CPT | Performed by: SURGERY

## 2024-09-03 PROCEDURE — 97110 THERAPEUTIC EXERCISES: CPT | Performed by: PHYSICAL THERAPIST

## 2024-09-03 ASSESSMENT — PATIENT HEALTH QUESTIONNAIRE - PHQ9
SUM OF ALL RESPONSES TO PHQ QUESTIONS 1-9: 0
SUM OF ALL RESPONSES TO PHQ QUESTIONS 1-9: 0
2. FEELING DOWN, DEPRESSED OR HOPELESS: NOT AT ALL
SUM OF ALL RESPONSES TO PHQ9 QUESTIONS 1 & 2: 0
SUM OF ALL RESPONSES TO PHQ QUESTIONS 1-9: 0
1. LITTLE INTEREST OR PLEASURE IN DOING THINGS: NOT AT ALL
SUM OF ALL RESPONSES TO PHQ QUESTIONS 1-9: 0

## 2024-09-03 NOTE — PROGRESS NOTES
Wound examined.  Postoperative hematoma should resolve.  He reported a lot of swelling postoperatively.  Acute from his Eliquis.  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  Can follow-up as needed    Hawk Krueger MD, FACS, [facility]  Bariatric and General Surgeon  [facility]

## 2024-09-03 NOTE — PROGRESS NOTES
PHYSICAL THERAPY - MEDICARE DAILY TREATMENT NOTE (updated 3/23)      Date: 9/3/2024          Patient Name:  Alonso Kruse :  1955   Medical   Diagnosis:  Chronic bilateral low back pain, unspecified whether sciatica present [M54.50, G89.29] Treatment Diagnosis:  M54.59, G89.29  CHRONIC LOWER BACK PAIN    Referral Source:  Guerline Steiner PA-C Insurance:   Payor: MEDICARE / Plan: MEDICARE PART A AND B / Product Type: *No Product type* /                     Patient  verified yes     Visit #   Current  / Total 3    Time   In / Out 7:55 A 850 A   Total Treatment Time 55   Total Timed Codes 55   1:1 Treatment Time 45      [facility] Totals Reminder:  bill using total billable   min of TIMED therapeutic procedures and modalities.   8-22 min = 1 unit; 23-37 min = 2 units; 38-52 min = 3 units; 53-67 min = 4 units; 68-82 min = 5 units            SUBJECTIVE    Pain Level (0-10 scale): 0    Any medication changes, allergies to medications, adverse drug reactions, diagnosis change, or new procedure performed?: [x] No    [] Yes (see summary sheet for update)  Medications: Verified on Patient Summary List    Subjective functional status/changes:     Patient reports that he swam over the weekend. He did his stretches before then went home and applied heat. Back felt pretty good.     OBJECTIVE      Therapeutic Procedures:  Tx Min Billable or 1:1 Min (if diff from Tx Min) Procedure, Rationale, Specifics   45  95617 Therapeutic Exercise (timed):  increase ROM, strength, coordination, balance, and proprioception to improve patient's ability to progress to PLOF and address remaining functional goals. (see flow sheet as applicable)     Details if applicable:       69602 Manual Therapy (timed):  decrease pain, increase ROM, and increase tissue extensibility to improve patient's ability to progress to PLOF and address remaining functional goals.  The manual therapy interventions were performed at a separate and distinct time

## 2024-09-03 NOTE — PROGRESS NOTES
Identified patient with two patient identifiers (name and ). Reviewed chart in preparation for visit and have obtained necessary documentation.    Alonso Kruse is a 69 y.o. male  No chief complaint on file.    /68 (Site: Right Upper Arm, Position: Sitting, Cuff Size: Medium Adult)   Pulse 74   Temp 97.9 °F (36.6 °C) (Oral)   Resp 18   Ht 1.854 m (6' 1\")   Wt 120.2 kg (265 lb)   SpO2 95%   BMI 34.96 kg/m²     1. Have you been to the ER, urgent care clinic since your last visit?  Hospitalized since your last visit?no    2. Have you seen or consulted any other health care providers outside of the [facility] since your last visit?  Include any pap smears or colon screening. no

## 2024-09-05 ENCOUNTER — HOSPITAL ENCOUNTER (OUTPATIENT)
Facility: HOSPITAL | Age: 69
Setting detail: RECURRING SERIES
Discharge: HOME | End: 2024-09-08
Payer: MEDICARE

## 2024-09-05 PROCEDURE — 97110 THERAPEUTIC EXERCISES: CPT | Performed by: PHYSICAL THERAPIST

## 2024-09-10 ENCOUNTER — HOSPITAL ENCOUNTER (OUTPATIENT)
Facility: HOSPITAL | Age: 69
Setting detail: RECURRING SERIES
Discharge: HOME | End: 2024-09-13
Payer: MEDICARE

## 2024-09-10 PROCEDURE — 97110 THERAPEUTIC EXERCISES: CPT | Performed by: PHYSICAL THERAPIST

## 2024-09-12 ENCOUNTER — HOSPITAL ENCOUNTER (OUTPATIENT)
Facility: HOSPITAL | Age: 69
Setting detail: RECURRING SERIES
End: 2024-09-12
Payer: MEDICARE

## 2024-09-17 ENCOUNTER — HOSPITAL ENCOUNTER (OUTPATIENT)
Facility: HOSPITAL | Age: 69
Setting detail: RECURRING SERIES
Discharge: HOME | End: 2024-09-20
Payer: MEDICARE

## 2024-09-17 PROCEDURE — 97110 THERAPEUTIC EXERCISES: CPT | Performed by: PHYSICAL THERAPIST

## 2024-09-19 ENCOUNTER — APPOINTMENT (OUTPATIENT)
Facility: HOSPITAL | Age: 69
End: 2024-09-19
Payer: MEDICARE

## 2024-09-24 ENCOUNTER — HOSPITAL ENCOUNTER (OUTPATIENT)
Facility: HOSPITAL | Age: 69
Setting detail: RECURRING SERIES
Discharge: HOME | End: 2024-09-27
Payer: MEDICARE

## 2024-09-24 PROCEDURE — 97110 THERAPEUTIC EXERCISES: CPT

## 2024-09-25 ENCOUNTER — TELEPHONE (OUTPATIENT)
Age: 69
End: 2024-09-25

## 2024-10-02 ENCOUNTER — HOSPITAL ENCOUNTER (OUTPATIENT)
Facility: HOSPITAL | Age: 69
Setting detail: RECURRING SERIES
Discharge: HOME | End: 2024-10-05
Payer: MEDICARE

## 2024-10-02 PROCEDURE — 97110 THERAPEUTIC EXERCISES: CPT | Performed by: PHYSICAL THERAPIST

## 2024-10-02 NOTE — PROGRESS NOTES
and analyze and modify for postural abnormalities to address functional deficits and attain remaining goals.    Progress toward goals / Updated goals:  []  See Progress Note/Recertification      Short Term Goals: To be accomplished in 2 weeks:               1. Patient will be I in HEP to promote self management of symptoms. PROGRESSING              2. Patient will report pain level at worst as less than or equal to 3/10 so they can perform ADLs without pain.  PROGRESSING  Long Term Goals: To be accomplished in 4-6 weeks:               1.  Patient will report pain level at worst as less than or equal to 1/10 so they can perform ADLs without pain.               2. Patient will be able to swim 30 min without low back pain afterwards.               3. Patient will be able to ambulate > or = 3000 feet without limitation from low back pain.          PLAN  Yes  Continue plan of care  Re-Cert Due: 11/24/24  [x]  Upgrade activities as tolerated  []  Discharge due to:  []  Other:      Tasha Hsieh, PT       10/2/2024       9:42 AM

## 2024-10-07 ENCOUNTER — OFFICE VISIT (OUTPATIENT)
Age: 69
End: 2024-10-07
Payer: MEDICARE

## 2024-10-07 VITALS
RESPIRATION RATE: 18 BRPM | HEART RATE: 63 BPM | TEMPERATURE: 98 F | WEIGHT: 271.8 LBS | BODY MASS INDEX: 36.02 KG/M2 | OXYGEN SATURATION: 94 % | DIASTOLIC BLOOD PRESSURE: 80 MMHG | HEIGHT: 73 IN | SYSTOLIC BLOOD PRESSURE: 124 MMHG

## 2024-10-07 DIAGNOSIS — R53.83 FATIGUE, UNSPECIFIED TYPE: ICD-10-CM

## 2024-10-07 DIAGNOSIS — E11.42 TYPE 2 DIABETES MELLITUS WITH POLYNEUROPATHY (HCC): ICD-10-CM

## 2024-10-07 DIAGNOSIS — R53.83 FATIGUE, UNSPECIFIED TYPE: Primary | ICD-10-CM

## 2024-10-07 DIAGNOSIS — E83.52 PARATHYROID RELATED HYPERCALCEMIA (HCC): ICD-10-CM

## 2024-10-07 DIAGNOSIS — I10 ESSENTIAL (PRIMARY) HYPERTENSION: ICD-10-CM

## 2024-10-07 DIAGNOSIS — E66.01 SEVERE OBESITY: ICD-10-CM

## 2024-10-07 DIAGNOSIS — E21.5 PARATHYROID RELATED HYPERCALCEMIA (HCC): ICD-10-CM

## 2024-10-07 PROCEDURE — G8417 CALC BMI ABV UP PARAM F/U: HCPCS | Performed by: PHYSICIAN ASSISTANT

## 2024-10-07 PROCEDURE — G8427 DOCREV CUR MEDS BY ELIG CLIN: HCPCS | Performed by: PHYSICIAN ASSISTANT

## 2024-10-07 PROCEDURE — 3017F COLORECTAL CA SCREEN DOC REV: CPT | Performed by: PHYSICIAN ASSISTANT

## 2024-10-07 PROCEDURE — 99214 OFFICE O/P EST MOD 30 MIN: CPT | Performed by: PHYSICIAN ASSISTANT

## 2024-10-07 PROCEDURE — 3074F SYST BP LT 130 MM HG: CPT | Performed by: PHYSICIAN ASSISTANT

## 2024-10-07 PROCEDURE — 1123F ACP DISCUSS/DSCN MKR DOCD: CPT | Performed by: PHYSICIAN ASSISTANT

## 2024-10-07 PROCEDURE — 2022F DILAT RTA XM EVC RTNOPTHY: CPT | Performed by: PHYSICIAN ASSISTANT

## 2024-10-07 PROCEDURE — 3079F DIAST BP 80-89 MM HG: CPT | Performed by: PHYSICIAN ASSISTANT

## 2024-10-07 PROCEDURE — G2211 COMPLEX E/M VISIT ADD ON: HCPCS | Performed by: PHYSICIAN ASSISTANT

## 2024-10-07 PROCEDURE — 1036F TOBACCO NON-USER: CPT | Performed by: PHYSICIAN ASSISTANT

## 2024-10-07 PROCEDURE — G8484 FLU IMMUNIZE NO ADMIN: HCPCS | Performed by: PHYSICIAN ASSISTANT

## 2024-10-07 PROCEDURE — 3044F HG A1C LEVEL LT 7.0%: CPT | Performed by: PHYSICIAN ASSISTANT

## 2024-10-07 RX ORDER — HYDROCHLOROTHIAZIDE 25 MG/1
25 TABLET ORAL EVERY MORNING
Qty: 90 TABLET | Refills: 1 | Status: SHIPPED | OUTPATIENT
Start: 2024-10-07

## 2024-10-07 ASSESSMENT — ENCOUNTER SYMPTOMS
COUGH: 0
SHORTNESS OF BREATH: 0

## 2024-10-07 NOTE — PROGRESS NOTES
Alonso Kruse is a 69 y.o. male  Chief Complaint   Patient presents with    Fatigue     2x weeks     Vitals:    10/07/24 1014   BP: 124/80   Pulse: 63   Resp: 18   Temp: 98 °F (36.7 °C)   SpO2: 94%      Wt Readings from Last 3 Encounters:   10/07/24 123.3 kg (271 lb 12.8 oz)   09/03/24 120.2 kg (265 lb)   08/20/24 117.9 kg (260 lb)     BMI Readings from Last 3 Encounters:   10/07/24 35.86 kg/m²   09/03/24 34.96 kg/m²   08/20/24 34.30 kg/m²     Health Maintenance Due   Topic Date Due    Diabetic retinal exam  Never done    DTaP/Tdap/Td vaccine (1 - Tdap) Never done    Respiratory Syncytial Virus (RSV) Pregnant or age 60 yrs+ (1 - 1-dose 60+ series) Never done    Prostate Specific Antigen (PSA) Screening or Monitoring  01/05/2022    Pneumococcal 65+ years Vaccine (2 of 2 - PCV) 10/18/2022    Diabetic foot exam  06/14/2023    Flu vaccine (1) Never done    COVID-19 Vaccine (5 - 2023-24 season) 09/01/2024     HPI  DM II - controlled. Feeling well.    Hemoglobin A1C (%)   Date Value   08/07/2024 6.4 (H)     Lab Results   Component Value Date    LDL 49.2 08/07/2024     Creatinine (MG/DL)   Date Value   08/07/2024 1.33 (H)     Currently taking no Diabetes Meds. Interested in taking GLP-1 for weight loss. Exercising regularly and following a low carb diet without weight improvement.     Wt Readings from Last 3 Encounters:   10/07/24 123.3 kg (271 lb 12.8 oz)   09/03/24 120.2 kg (265 lb)   08/20/24 117.9 kg (260 lb)     Saw an outpatient pain med practice and has a report showing some peripheral neuropathy. Pt is not particularly bothered by neuropathic pain in B feet. Wonders if he should treat this. I reassured him that if his daily life is not affected and his DM is controlled, he doesn't need to do treatment or take medications for this.     Feeling fatigued x 2 weeks. Denies depression. Able to exercise regularly (swimming) as usual.     UTD with endocrine - they are monitoring parathyroid related hypercalcemia.

## 2024-10-07 NOTE — PROGRESS NOTES
I have reviewed all needed documentation in preparation for visit. Verified patient by name and date of birth  Chief Complaint   Patient presents with    Fatigue     2x weeks       Vitals:    10/07/24 1014   BP: 124/80   Site: Right Upper Arm   Position: Sitting   Cuff Size: Large Adult   Pulse: 63   Resp: 18   Temp: 98 °F (36.7 °C)   TempSrc: Temporal   SpO2: 94%   Weight: 123.3 kg (271 lb 12.8 oz)   Height: 1.854 m (6' 1\")       Health Maintenance Due   Topic Date Due    Diabetic retinal exam  Never done    DTaP/Tdap/Td vaccine (1 - Tdap) Never done    Respiratory Syncytial Virus (RSV) Pregnant or age 60 yrs+ (1 - 1-dose 60+ series) Never done    Prostate Specific Antigen (PSA) Screening or Monitoring  01/05/2022    Pneumococcal 65+ years Vaccine (2 of 2 - PCV) 10/18/2022    Diabetic foot exam  06/14/2023    Flu vaccine (1) Never done    COVID-19 Vaccine (5 - 2023-24 season) 09/01/2024     \"Have you been to the ER, urgent care clinic since your last visit?  Hospitalized since your last visit?\"    NO    “Have you seen or consulted any other health care providers outside of [facility] since your last visit?”    NO            Click Here for Release of Records Request         ALEX Grayson

## 2024-10-08 LAB
ALBUMIN SERPL-MCNC: 3.9 G/DL (ref 3.5–5)
ALBUMIN/GLOB SERPL: 1 (ref 1.1–2.2)
ALP SERPL-CCNC: 91 U/L (ref 45–117)
ALT SERPL-CCNC: 39 U/L (ref 12–78)
ANION GAP SERPL CALC-SCNC: 2 MMOL/L (ref 2–12)
APPEARANCE UR: CLEAR
AST SERPL-CCNC: 18 U/L (ref 15–37)
BACTERIA URNS QL MICRO: NEGATIVE /HPF
BILIRUB SERPL-MCNC: 1.1 MG/DL (ref 0.2–1)
BILIRUB UR QL: NEGATIVE
BUN SERPL-MCNC: 24 MG/DL (ref 6–20)
BUN/CREAT SERPL: 16 (ref 12–20)
CALCIUM SERPL-MCNC: 10.2 MG/DL (ref 8.5–10.1)
CHLORIDE SERPL-SCNC: 104 MMOL/L (ref 97–108)
CO2 SERPL-SCNC: 31 MMOL/L (ref 21–32)
COLOR UR: NORMAL
CREAT SERPL-MCNC: 1.46 MG/DL (ref 0.7–1.3)
EPITH CASTS URNS QL MICRO: NORMAL /LPF
ERYTHROCYTE [DISTWIDTH] IN BLOOD BY AUTOMATED COUNT: 14.8 % (ref 11.5–14.5)
GLOBULIN SER CALC-MCNC: 3.9 G/DL (ref 2–4)
GLUCOSE SERPL-MCNC: 103 MG/DL (ref 65–100)
GLUCOSE UR STRIP.AUTO-MCNC: NEGATIVE MG/DL
HCT VFR BLD AUTO: 50.2 % (ref 36.6–50.3)
HGB BLD-MCNC: 16 G/DL (ref 12.1–17)
HGB UR QL STRIP: NEGATIVE
HYALINE CASTS URNS QL MICRO: NORMAL /LPF (ref 0–5)
KETONES UR QL STRIP.AUTO: NEGATIVE MG/DL
LEUKOCYTE ESTERASE UR QL STRIP.AUTO: NEGATIVE
MCH RBC QN AUTO: 28.6 PG (ref 26–34)
MCHC RBC AUTO-ENTMCNC: 31.9 G/DL (ref 30–36.5)
MCV RBC AUTO: 89.6 FL (ref 80–99)
NITRITE UR QL STRIP.AUTO: NEGATIVE
NRBC # BLD: 0 K/UL (ref 0–0.01)
NRBC BLD-RTO: 0 PER 100 WBC
PH UR STRIP: 7 (ref 5–8)
PLATELET # BLD AUTO: 210 K/UL (ref 150–400)
PMV BLD AUTO: 10.7 FL (ref 8.9–12.9)
POTASSIUM SERPL-SCNC: 3.8 MMOL/L (ref 3.5–5.1)
PROT SERPL-MCNC: 7.8 G/DL (ref 6.4–8.2)
PROT UR STRIP-MCNC: NEGATIVE MG/DL
RBC # BLD AUTO: 5.6 M/UL (ref 4.1–5.7)
RBC #/AREA URNS HPF: NORMAL /HPF (ref 0–5)
SODIUM SERPL-SCNC: 137 MMOL/L (ref 136–145)
SP GR UR REFRACTOMETRY: 1.01 (ref 1–1.03)
URINE CULTURE IF INDICATED: NORMAL
UROBILINOGEN UR QL STRIP.AUTO: 1 EU/DL (ref 0.2–1)
WBC # BLD AUTO: 4.4 K/UL (ref 4.1–11.1)
WBC URNS QL MICRO: NORMAL /HPF (ref 0–4)

## 2024-10-09 LAB — TSH SERPL DL<=0.05 MIU/L-ACNC: 1.04 UIU/ML (ref 0.45–4.5)

## 2024-10-16 ENCOUNTER — TELEPHONE (OUTPATIENT)
Age: 69
End: 2024-10-16

## 2024-10-16 DIAGNOSIS — E66.01 SEVERE OBESITY: ICD-10-CM

## 2024-10-16 DIAGNOSIS — R80.9 TYPE 2 DIABETES MELLITUS WITH MICROALBUMINURIA, WITHOUT LONG-TERM CURRENT USE OF INSULIN (HCC): Primary | ICD-10-CM

## 2024-10-16 DIAGNOSIS — E11.29 TYPE 2 DIABETES MELLITUS WITH MICROALBUMINURIA, WITHOUT LONG-TERM CURRENT USE OF INSULIN (HCC): Primary | ICD-10-CM

## 2024-11-07 ENCOUNTER — OFFICE VISIT (OUTPATIENT)
Age: 69
End: 2024-11-07
Payer: MEDICARE

## 2024-11-07 VITALS
BODY MASS INDEX: 34.91 KG/M2 | SYSTOLIC BLOOD PRESSURE: 112 MMHG | OXYGEN SATURATION: 95 % | WEIGHT: 263.4 LBS | DIASTOLIC BLOOD PRESSURE: 71 MMHG | HEART RATE: 77 BPM | TEMPERATURE: 97.8 F | HEIGHT: 73 IN

## 2024-11-07 DIAGNOSIS — E83.52 PARATHYROID RELATED HYPERCALCEMIA (HCC): ICD-10-CM

## 2024-11-07 DIAGNOSIS — E21.5 PARATHYROID RELATED HYPERCALCEMIA (HCC): ICD-10-CM

## 2024-11-07 DIAGNOSIS — D35.01 BILATERAL ADRENAL ADENOMAS: Primary | ICD-10-CM

## 2024-11-07 DIAGNOSIS — D35.02 BILATERAL ADRENAL ADENOMAS: Primary | ICD-10-CM

## 2024-11-07 PROCEDURE — 3078F DIAST BP <80 MM HG: CPT | Performed by: INTERNAL MEDICINE

## 2024-11-07 PROCEDURE — G8484 FLU IMMUNIZE NO ADMIN: HCPCS | Performed by: INTERNAL MEDICINE

## 2024-11-07 PROCEDURE — G8428 CUR MEDS NOT DOCUMENT: HCPCS | Performed by: INTERNAL MEDICINE

## 2024-11-07 PROCEDURE — 1123F ACP DISCUSS/DSCN MKR DOCD: CPT | Performed by: INTERNAL MEDICINE

## 2024-11-07 PROCEDURE — 1036F TOBACCO NON-USER: CPT | Performed by: INTERNAL MEDICINE

## 2024-11-07 PROCEDURE — 3017F COLORECTAL CA SCREEN DOC REV: CPT | Performed by: INTERNAL MEDICINE

## 2024-11-07 PROCEDURE — 99214 OFFICE O/P EST MOD 30 MIN: CPT | Performed by: INTERNAL MEDICINE

## 2024-11-07 PROCEDURE — 3074F SYST BP LT 130 MM HG: CPT | Performed by: INTERNAL MEDICINE

## 2024-11-07 PROCEDURE — 1126F AMNT PAIN NOTED NONE PRSNT: CPT | Performed by: INTERNAL MEDICINE

## 2024-11-07 PROCEDURE — G8417 CALC BMI ABV UP PARAM F/U: HCPCS | Performed by: INTERNAL MEDICINE

## 2024-11-07 RX ORDER — DEXAMETHASONE 1 MG
TABLET ORAL
Qty: 1 TABLET | Refills: 0 | Status: SHIPPED | OUTPATIENT
Start: 2024-11-07

## 2024-11-07 NOTE — PROGRESS NOTES
Endocrine Follow up     PCP: Guerline Steiner PA-C    Chief Complaint   Patient presents with    Bilateral Adrenal Adenomas     HPI:  Alonso Kruse is a 69 y.o. male with  has a past medical history of Atrial fibrillation (HCC), Diabetes mellitus (HCC), Hyperlipidemia, Hypertension, Prostate cancer (HCC), and Sleep apnea. Here for follow up of adrenal adenoma and hypercalcemia.     Prostate ca s/p XRT     Patient of Dr. Rich, last visit 1/2024  Saw dr rich for hypercalcemia   \"Normal Ca for several years (so very unlikely [facility]) then high since summer  PTH elevated so seems PTH mediated  HCTZ was increased right before this which could be the culprit  Check Vit D level as that can contribute if low  In mild cases usually we just watch over time as if over-active parathyroid often difficult to identify; and this is ok as long as low risk bone loss and for kidney stones  Get labs  Decide if need to lower or stop HCTZ to further investigate   US does not show any obvious parathyroid  Thyroid gland is normal in size and texture and without any nodules   TSH is normal too\"    Adrenal nodule - per pt dx 9 years ago   Had repeat CT 2020 - no reports here   Only CT report I have here is from 4/19/24    Adrenal CT/MRI: 4/2024  Multifocal nodularity of the left adrenal gland   Superior nodule measuring 31 mm, 17 HU  inferior nodule measuring 19 mm,  8 HU  Right adrenal gland  18 mm nodule measuring -15 HU  low-density lesions at the lower pole of the left adrenal gland and in the right  adrenal gland are consistent with lipid rich adenomas. The upper left adrenal  nodule is indeterminate by density.    No   Rapid weight gain, buffalo hump, proximal weakness, easy bruising, acne, striae, skin thinning,   irritability, depression, hyperpigmentation    Feeling well today   Reviewed labs + imaging today     BRIEF ROS   Gen: no fevers/chills  CV: no chest pain  Resp: no shortness of breath, cough   GI: no nausea, emesis,

## 2024-11-11 LAB — CORTIS AM PEAK SERPL-MCNC: 9.9 UG/DL (ref 6.2–19.4)

## 2024-11-14 ENCOUNTER — TELEPHONE (OUTPATIENT)
Age: 69
End: 2024-11-14

## 2024-11-14 DIAGNOSIS — E83.52 PARATHYROID RELATED HYPERCALCEMIA (HCC): ICD-10-CM

## 2024-11-14 DIAGNOSIS — D35.02 BILATERAL ADRENAL ADENOMAS: Primary | ICD-10-CM

## 2024-11-14 DIAGNOSIS — D35.01 BILATERAL ADRENAL ADENOMAS: Primary | ICD-10-CM

## 2024-11-14 DIAGNOSIS — E21.5 PARATHYROID RELATED HYPERCALCEMIA (HCC): ICD-10-CM

## 2024-11-14 NOTE — TELEPHONE ENCOUNTER
Attempted to call. Unsuccessful. Left msg for Alonos Kruse to give us a call back at the office. A callback number was left.

## 2024-11-14 NOTE — TELEPHONE ENCOUNTER
----- Message from Dr. Racquel Parikh MD sent at 11/14/2024 10:07 AM EST -----  This test suggests he did not take dexamethasone the night before, was the test completed as asked?

## 2024-11-15 NOTE — TELEPHONE ENCOUNTER
Pt was called and he stated that he took the dexamethasone between 11pm and midnight the night before he went to get labs drawn.

## 2024-11-15 NOTE — TELEPHONE ENCOUNTER
Informed pt of Dr. Parikh's note(Ok complete other labs then we will repeat testing). Pt verbalized understanding. Pt was upset that he have to keep getting lab work. Pt stated he will go to [facility] to have lab drawn but wants Dr. Parikh to know that he does not want to repeat the cortisol AM lab

## 2024-11-15 NOTE — TELEPHONE ENCOUNTER
Informed pt of Dr. Parikh's note(Ok that's fine - let him know he dosent have to repeat it   But also these labs are not urgent..he can wait until next year to get them). [facility]

## 2024-11-20 ENCOUNTER — TELEPHONE (OUTPATIENT)
Age: 69
End: 2024-11-20

## 2024-11-20 DIAGNOSIS — R80.9 TYPE 2 DIABETES MELLITUS WITH MICROALBUMINURIA, WITHOUT LONG-TERM CURRENT USE OF INSULIN (HCC): Primary | ICD-10-CM

## 2024-11-20 DIAGNOSIS — E11.29 TYPE 2 DIABETES MELLITUS WITH MICROALBUMINURIA, WITHOUT LONG-TERM CURRENT USE OF INSULIN (HCC): Primary | ICD-10-CM

## 2024-11-20 RX ORDER — PEN NEEDLE, DIABETIC 30 GX3/16"
NEEDLE, DISPOSABLE MISCELLANEOUS
Qty: 30 EACH | Refills: 1 | Status: SHIPPED | OUTPATIENT
Start: 2024-11-20

## 2024-11-20 NOTE — TELEPHONE ENCOUNTER
Pt is requesting a refill for the following medication      Semaglutide,0.25 or 0.5MG/DOS, 2 MG/1.5ML SOPN     Pt would like to know if his dose will change or stay the same. He would also like to know if he can gt the refill for 90 days and not 60 days. States that he pays $40 for 60 days and would be paying the same for 90 days and would have more medication. Pt would also like to get extra needles as well.

## 2024-11-20 NOTE — TELEPHONE ENCOUNTER
Increasing dose to 1 mg weekly. 90 day Rx sent with extra needles (though the pens should come with adequate needles). Schedule In Clinic follow up in 3 months.

## 2024-11-30 LAB
1,25(OH)2D SERPL-MCNC: 20.5 PG/ML (ref 24.8–81.5)
25(OH)D3+25(OH)D2 SERPL-MCNC: 35.9 NG/ML (ref 30–100)
BUN SERPL-MCNC: 29 MG/DL (ref 8–27)
BUN/CREAT SERPL: 18 (ref 10–24)
CALCIUM SERPL-MCNC: 10.6 MG/DL (ref 8.6–10.2)
CHLORIDE SERPL-SCNC: 99 MMOL/L (ref 96–106)
CO2 SERPL-SCNC: 25 MMOL/L (ref 20–29)
CREAT SERPL-MCNC: 1.61 MG/DL (ref 0.76–1.27)
EGFRCR SERPLBLD CKD-EPI 2021: 45 ML/MIN/1.73
GLUCOSE SERPL-MCNC: 83 MG/DL (ref 70–99)
POTASSIUM SERPL-SCNC: 4.5 MMOL/L (ref 3.5–5.2)
PTH-INTACT SERPL-MCNC: 34 PG/ML (ref 15–65)
REPORT: NORMAL
SODIUM SERPL-SCNC: 139 MMOL/L (ref 134–144)

## 2024-12-31 ENCOUNTER — TELEPHONE (OUTPATIENT)
Age: 69
End: 2024-12-31

## 2024-12-31 DIAGNOSIS — E83.52 HYPERCALCEMIA: Primary | ICD-10-CM

## 2024-12-31 DIAGNOSIS — E83.52 PARATHYROID RELATED HYPERCALCEMIA (HCC): Primary | ICD-10-CM

## 2024-12-31 DIAGNOSIS — E21.5 PARATHYROID RELATED HYPERCALCEMIA (HCC): Primary | ICD-10-CM

## 2024-12-31 NOTE — TELEPHONE ENCOUNTER
Informed pt of Dr. Singh's note. Pt verbalized understanding with no further questions or concerns at this time. [facility] and was transferred to the front to schedule appt with Dr. Parikh.

## 2024-12-31 NOTE — TELEPHONE ENCOUNTER
----- Message from Dr. Racquel Parikh MD sent at 12/31/2024  1:19 PM EST -----  Low 1, 25 [place] vitamin D level (can be seen in kidney disease), monitor  Mild high calcium   Parathyroid normal   Do additional tests for high calcium (ordered) and change follow up to within 3 months

## 2025-01-31 ENCOUNTER — TELEPHONE (OUTPATIENT)
Age: 70
End: 2025-01-31

## 2025-01-31 DIAGNOSIS — I10 ESSENTIAL (PRIMARY) HYPERTENSION: ICD-10-CM

## 2025-01-31 NOTE — TELEPHONE ENCOUNTER
Refill request received from [facility] for   Requested Prescriptions     Pending Prescriptions Disp Refills    hydroCHLOROthiazide (HYDRODIURIL) 25 MG tablet 90 tablet 1     Sig: Take 1 tablet by mouth every morning     Last office visit: 10/7/2024   Next office visit: 2/10/2025     Routed to KIMBERLY Bronson for review.         Edy Guerrero Cma

## 2025-02-03 RX ORDER — HYDROCHLOROTHIAZIDE 25 MG/1
25 TABLET ORAL EVERY MORNING
Qty: 90 TABLET | Refills: 1 | Status: SHIPPED | OUTPATIENT
Start: 2025-02-03

## 2025-02-04 ENCOUNTER — TELEPHONE (OUTPATIENT)
Age: 70
End: 2025-02-04

## 2025-02-04 NOTE — TELEPHONE ENCOUNTER
Patient was scheduled for a 15 minute OV on 02/10/25 at 9:45 AM. Patient requires 30 minute appt due to age. Left vm and sent [employer] message informing patient that this appointment was scheduled incorrectly and has been cancelled. Left number for patient to return call to reschedule. Patient VT modifiers updated.

## 2025-02-08 DIAGNOSIS — E78.00 HYPERCHOLESTEREMIA: ICD-10-CM

## 2025-02-09 LAB — PTH RELATED PROT SERPL-SCNC: <2 PMOL/L

## 2025-02-10 ENCOUNTER — OFFICE VISIT (OUTPATIENT)
Age: 70
End: 2025-02-10
Payer: MEDICARE

## 2025-02-10 VITALS
BODY MASS INDEX: 33.27 KG/M2 | OXYGEN SATURATION: 96 % | HEART RATE: 74 BPM | RESPIRATION RATE: 18 BRPM | SYSTOLIC BLOOD PRESSURE: 110 MMHG | TEMPERATURE: 98.1 F | DIASTOLIC BLOOD PRESSURE: 73 MMHG | HEIGHT: 73 IN | WEIGHT: 251 LBS

## 2025-02-10 DIAGNOSIS — R80.9 TYPE 2 DIABETES MELLITUS WITH MICROALBUMINURIA, WITHOUT LONG-TERM CURRENT USE OF INSULIN (HCC): ICD-10-CM

## 2025-02-10 DIAGNOSIS — N18.31 CKD STAGE 3A, GFR 45-59 ML/MIN (HCC): ICD-10-CM

## 2025-02-10 DIAGNOSIS — I48.0 PAROXYSMAL ATRIAL FIBRILLATION (HCC): ICD-10-CM

## 2025-02-10 DIAGNOSIS — E78.00 HYPERCHOLESTEREMIA: ICD-10-CM

## 2025-02-10 DIAGNOSIS — E66.9 OBESITY (BMI 30-39.9): ICD-10-CM

## 2025-02-10 DIAGNOSIS — E11.29 TYPE 2 DIABETES MELLITUS WITH MICROALBUMINURIA, WITHOUT LONG-TERM CURRENT USE OF INSULIN (HCC): ICD-10-CM

## 2025-02-10 DIAGNOSIS — R80.9 TYPE 2 DIABETES MELLITUS WITH MICROALBUMINURIA, WITHOUT LONG-TERM CURRENT USE OF INSULIN (HCC): Primary | ICD-10-CM

## 2025-02-10 DIAGNOSIS — C61 PROSTATE CANCER (HCC): ICD-10-CM

## 2025-02-10 DIAGNOSIS — I10 ESSENTIAL (PRIMARY) HYPERTENSION: ICD-10-CM

## 2025-02-10 DIAGNOSIS — E11.29 TYPE 2 DIABETES MELLITUS WITH MICROALBUMINURIA, WITHOUT LONG-TERM CURRENT USE OF INSULIN (HCC): Primary | ICD-10-CM

## 2025-02-10 PROCEDURE — G8427 DOCREV CUR MEDS BY ELIG CLIN: HCPCS | Performed by: PHYSICIAN ASSISTANT

## 2025-02-10 PROCEDURE — 1159F MED LIST DOCD IN RCRD: CPT | Performed by: PHYSICIAN ASSISTANT

## 2025-02-10 PROCEDURE — 1160F RVW MEDS BY RX/DR IN RCRD: CPT | Performed by: PHYSICIAN ASSISTANT

## 2025-02-10 PROCEDURE — G8417 CALC BMI ABV UP PARAM F/U: HCPCS | Performed by: PHYSICIAN ASSISTANT

## 2025-02-10 PROCEDURE — 2022F DILAT RTA XM EVC RTNOPTHY: CPT | Performed by: PHYSICIAN ASSISTANT

## 2025-02-10 PROCEDURE — 1126F AMNT PAIN NOTED NONE PRSNT: CPT | Performed by: PHYSICIAN ASSISTANT

## 2025-02-10 PROCEDURE — 99214 OFFICE O/P EST MOD 30 MIN: CPT | Performed by: PHYSICIAN ASSISTANT

## 2025-02-10 PROCEDURE — 1123F ACP DISCUSS/DSCN MKR DOCD: CPT | Performed by: PHYSICIAN ASSISTANT

## 2025-02-10 PROCEDURE — 3017F COLORECTAL CA SCREEN DOC REV: CPT | Performed by: PHYSICIAN ASSISTANT

## 2025-02-10 PROCEDURE — 3078F DIAST BP <80 MM HG: CPT | Performed by: PHYSICIAN ASSISTANT

## 2025-02-10 PROCEDURE — 3074F SYST BP LT 130 MM HG: CPT | Performed by: PHYSICIAN ASSISTANT

## 2025-02-10 PROCEDURE — G2211 COMPLEX E/M VISIT ADD ON: HCPCS | Performed by: PHYSICIAN ASSISTANT

## 2025-02-10 PROCEDURE — 1036F TOBACCO NON-USER: CPT | Performed by: PHYSICIAN ASSISTANT

## 2025-02-10 PROCEDURE — 3046F HEMOGLOBIN A1C LEVEL >9.0%: CPT | Performed by: PHYSICIAN ASSISTANT

## 2025-02-10 RX ORDER — ROSUVASTATIN CALCIUM 20 MG/1
20 TABLET, COATED ORAL DAILY
Qty: 90 TABLET | Refills: 1 | Status: SHIPPED | OUTPATIENT
Start: 2025-02-10

## 2025-02-10 RX ORDER — SEMAGLUTIDE 1.34 MG/ML
1 INJECTION, SOLUTION SUBCUTANEOUS
OUTPATIENT
Start: 2025-02-10

## 2025-02-10 RX ORDER — APIXABAN 5 MG/1
5 TABLET, FILM COATED ORAL 2 TIMES DAILY
Qty: 180 TABLET | Refills: 3 | Status: SHIPPED | OUTPATIENT
Start: 2025-02-10

## 2025-02-10 RX ORDER — ROSUVASTATIN CALCIUM 20 MG/1
20 TABLET, COATED ORAL DAILY
Qty: 90 TABLET | Refills: 1 | Status: SHIPPED | OUTPATIENT
Start: 2025-02-10 | End: 2025-02-10 | Stop reason: SDUPTHER

## 2025-02-10 RX ORDER — CARVEDILOL 25 MG/1
25 TABLET ORAL 2 TIMES DAILY
Qty: 180 TABLET | Refills: 1 | Status: SHIPPED | OUTPATIENT
Start: 2025-02-10

## 2025-02-10 SDOH — ECONOMIC STABILITY: FOOD INSECURITY: WITHIN THE PAST 12 MONTHS, YOU WORRIED THAT YOUR FOOD WOULD RUN OUT BEFORE YOU GOT MONEY TO BUY MORE.: NEVER TRUE

## 2025-02-10 SDOH — ECONOMIC STABILITY: FOOD INSECURITY: WITHIN THE PAST 12 MONTHS, THE FOOD YOU BOUGHT JUST DIDN'T LAST AND YOU DIDN'T HAVE MONEY TO GET MORE.: NEVER TRUE

## 2025-02-10 ASSESSMENT — PATIENT HEALTH QUESTIONNAIRE - PHQ9
2. FEELING DOWN, DEPRESSED OR HOPELESS: NOT AT ALL
SUM OF ALL RESPONSES TO PHQ9 QUESTIONS 1 & 2: 0
SUM OF ALL RESPONSES TO PHQ QUESTIONS 1-9: 0
1. LITTLE INTEREST OR PLEASURE IN DOING THINGS: NOT AT ALL
SUM OF ALL RESPONSES TO PHQ QUESTIONS 1-9: 0

## 2025-02-10 ASSESSMENT — ENCOUNTER SYMPTOMS
COUGH: 0
SHORTNESS OF BREATH: 0

## 2025-02-10 NOTE — PROGRESS NOTES
I have reviewed all needed documentation in preparation for visit. Verified patient by name and date of birth  Chief Complaint   Patient presents with    6 Month Follow-Up     No concerns        Vitals:    02/10/25 1314   BP: 110/73   Site: Right Upper Arm   Position: Sitting   Cuff Size: Medium Adult   Pulse: 74   Resp: 18   Temp: 98.1 °F (36.7 °C)   TempSrc: Temporal   SpO2: 96%   Weight: 113.9 kg (251 lb)   Height: 1.854 m (6' 1\")       Health Maintenance Due   Topic Date Due    Diabetic retinal exam  Never done    DTaP/Tdap/Td vaccine (1 - Tdap) Never done    Respiratory Syncytial Virus (RSV) Pregnant or age 60 yrs+ (1 - Risk 60-74 years 1-dose series) Never done    Prostate Specific Antigen (PSA) Screening or Monitoring  01/05/2022    Pneumococcal 50+ years Vaccine (2 of 2 - PCV) 10/18/2022    Diabetic foot exam  06/14/2023    Flu vaccine (1) Never done    COVID-19 Vaccine (7 - 2024-25 season) 09/01/2024    Diabetic Alb to Cr ratio (uACR) test  01/29/2025     \"Have you been to the ER, urgent care clinic since your last visit?  Hospitalized since your last visit?\"    YES    “Have you seen or consulted any other health care providers outside of [facility] since your last visit?”    YES            Click Here for Release of Records Request         Mirna dominguez CMA

## 2025-02-10 NOTE — PROGRESS NOTES
Alonso Kruse is a 69 y.o. male  Chief Complaint   Patient presents with    6 Month Follow-Up     No concerns      Vitals:    02/10/25 1314   BP: 110/73   Pulse: 74   Resp: 18   Temp: 98.1 °F (36.7 °C)   SpO2: 96%      Wt Readings from Last 3 Encounters:   02/10/25 113.9 kg (251 lb)   11/07/24 119.5 kg (263 lb 6.4 oz)   10/07/24 123.3 kg (271 lb 12.8 oz)     BMI Readings from Last 3 Encounters:   02/10/25 33.12 kg/m²   11/07/24 34.75 kg/m²   10/07/24 35.86 kg/m²     Health Maintenance Due   Topic Date Due    Diabetic retinal exam  Never done    DTaP/Tdap/Td vaccine (1 - Tdap) Never done    Respiratory Syncytial Virus (RSV) Pregnant or age 60 yrs+ (1 - Risk 60-74 years 1-dose series) Never done    Prostate Specific Antigen (PSA) Screening or Monitoring  01/05/2022    Pneumococcal 50+ years Vaccine (2 of 2 - PCV) 10/18/2022    Diabetic foot exam  06/14/2023    Flu vaccine (1) Never done    COVID-19 Vaccine (7 - 2024-25 season) 09/01/2024    Diabetic Alb to Cr ratio (uACR) test  01/29/2025     HPI  DM II - controlled. Feeling well.   Hemoglobin A1C (%)   Date Value   08/07/2024 6.4 (H)     Lab Results   Component Value Date    LDL 49.2 08/07/2024     Creatinine (mg/dL)   Date Value   11/29/2024 1.61 (H)     Currently taking Diabetes Meds   Diabetic Medications       Incretin Mimetic Agents       Semaglutide, 1 MG/DOSE, (OZEMPIC) 4 MG/3ML SOPN sc injection Inject 1 mg into the skin every 7 days     Patient not taking: Reported on 2/10/2025          Pleased with weight loss.   Wt Readings from Last 3 Encounters:   02/10/25 113.9 kg (251 lb)   11/07/24 119.5 kg (263 lb 6.4 oz)   10/07/24 123.3 kg (271 lb 12.8 oz)     CV follow up. HTN, A Fib. Doing well. Asymptomatic.   BP controlled:  BP Readings from Last 3 Encounters:   02/10/25 110/73   11/07/24 112/71   10/07/24 124/80     Currently taking:   Hypertension Medications       Calcium Channel Blockers       amLODIPine (NORVASC) 10 MG tablet Take 1 tablet by mouth daily

## 2025-02-12 LAB
ALBUMIN SERPL-MCNC: 4.2 G/DL (ref 3.8–4.8)
ALP SERPL-CCNC: 78 IU/L (ref 44–121)
ALT SERPL-CCNC: 41 IU/L (ref 0–44)
AST SERPL-CCNC: 20 IU/L (ref 0–40)
BILIRUB SERPL-MCNC: 1.3 MG/DL (ref 0–1.2)
BUN SERPL-MCNC: 25 MG/DL (ref 8–27)
BUN/CREAT SERPL: 19 (ref 10–24)
CALCIUM SERPL-MCNC: 10.2 MG/DL (ref 8.6–10.2)
CHLORIDE SERPL-SCNC: 100 MMOL/L (ref 96–106)
CO2 SERPL-SCNC: 22 MMOL/L (ref 20–29)
CREAT SERPL-MCNC: 1.35 MG/DL (ref 0.76–1.27)
EGFRCR SERPLBLD CKD-EPI 2021: 56 ML/MIN/1.73
GLOBULIN SER CALC-MCNC: 2.8 G/DL (ref 1.5–4.5)
GLUCOSE SERPL-MCNC: 104 MG/DL (ref 70–99)
HBA1C MFR BLD: 6.6 % (ref 4.8–5.6)
Lab: NORMAL
POTASSIUM SERPL-SCNC: 4.2 MMOL/L (ref 3.5–5.2)
PROT SERPL-MCNC: 7 G/DL (ref 6–8.5)
REPORT: NORMAL
SODIUM SERPL-SCNC: 138 MMOL/L (ref 134–144)

## 2025-03-05 ENCOUNTER — OFFICE VISIT (OUTPATIENT)
Age: 70
End: 2025-03-05
Payer: MEDICARE

## 2025-03-05 VITALS
DIASTOLIC BLOOD PRESSURE: 78 MMHG | WEIGHT: 249.2 LBS | BODY MASS INDEX: 33.03 KG/M2 | TEMPERATURE: 97.7 F | HEART RATE: 75 BPM | OXYGEN SATURATION: 95 % | HEIGHT: 73 IN | SYSTOLIC BLOOD PRESSURE: 133 MMHG

## 2025-03-05 DIAGNOSIS — D35.01 BILATERAL ADRENAL ADENOMAS: Primary | ICD-10-CM

## 2025-03-05 DIAGNOSIS — E83.52 PARATHYROID RELATED HYPERCALCEMIA: ICD-10-CM

## 2025-03-05 DIAGNOSIS — D35.02 BILATERAL ADRENAL ADENOMAS: Primary | ICD-10-CM

## 2025-03-05 DIAGNOSIS — E21.5 PARATHYROID RELATED HYPERCALCEMIA: ICD-10-CM

## 2025-03-05 PROCEDURE — 99214 OFFICE O/P EST MOD 30 MIN: CPT | Performed by: INTERNAL MEDICINE

## 2025-03-05 RX ORDER — DEXAMETHASONE 1 MG
TABLET ORAL
Qty: 1 TABLET | Refills: 0 | Status: SHIPPED | OUTPATIENT
Start: 2025-03-05

## 2025-03-05 NOTE — PROGRESS NOTES
Alonso Kruse is a 69 y.o. male here for   Chief Complaint   Patient presents with    Adrenal Adenomas       1. Have you been to the ER, urgent care clinic since your last visit?  Hospitalized since your last visit? -NO    2. Have you seen or consulted any other health care providers outside of the [facility] since your last visit?  Include any pap smears or colon screening.-NO      
[facility]  \"Normal Ca for several years (so very unlikely [facility]) then high since summer  PTH elevated so seems PTH mediated  HCTZ was increased right before this which could be the culprit  Check Vit D level as that can contribute if low  In mild cases usually we just watch over time as if over-active parathyroid often difficult to identify; and this is ok as long as low risk bone loss and for kidney stones  Get labs  Decide if need to lower or stop HCTZ to further investigate\"  Though intermittent mild hypercalcemia   1,25 OH ordered but 25 OHD completed   OSVALDO normal    On hctz - 24 hr urine ca 94           Ok with portal for results.     Return in about 5 months (around 8/5/2025).    The patient (or guardian, if applicable) and other individuals in attendance with the patient were advised that Artificial Intelligence will be utilized during this visit to record, process the conversation to generate a clinical note, and support improvement of the AI technology. The patient (or guardian, if applicable) and other individuals in attendance at the appointment consented to the use of AI, including the recording.        
123

## 2025-03-10 LAB — CORTIS AM PEAK SERPL-MCNC: 8.3 UG/DL (ref 6.2–19.4)

## 2025-03-11 ENCOUNTER — RESULTS FOLLOW-UP (OUTPATIENT)
Age: 70
End: 2025-03-11

## 2025-03-20 LAB — DEXAMETHASONE SERPL-MCNC: 619 NG/DL

## 2025-03-28 ENCOUNTER — TELEPHONE (OUTPATIENT)
Age: 70
End: 2025-03-28

## 2025-03-28 DIAGNOSIS — E24.0 CUSHING'S DISEASE (HCC): Primary | ICD-10-CM

## 2025-03-28 NOTE — TELEPHONE ENCOUNTER
Informed pt of Dr. Parikh's note(Cortisol levels are high. Your adrenal hormones are also high. Further evaluation is needed. Are you able to follow up within 1 month? Best, Racquel Parikh MD). Pt was upset. He stated he is confused because he reviewed his own result and saw that it is within normal range. He stated he disagree with what Dr. Parikh is saying and is requesting a call from her after 1pm today.

## 2025-03-28 NOTE — TELEPHONE ENCOUNTER
Pt was called and I went over instruction for the Salivary Cortisol and 24 hr urine. Pt verbalized understanding with no further questions or concerns at this time. Pt stated he will come by next week to  specimen supplies

## 2025-04-11 ENCOUNTER — TELEPHONE (OUTPATIENT)
Age: 70
End: 2025-04-11

## 2025-04-11 NOTE — TELEPHONE ENCOUNTER
Spoke w/ pt, coordinated him going to [facility] draw center to  new 24 hr urine and salivary cortisol kit on 4/15/25, he tried dropping off specimens to  [facility], they refused them said they had to be discarded.

## 2025-04-18 DIAGNOSIS — E24.0 CUSHING'S DISEASE (HCC): ICD-10-CM

## 2025-04-22 DIAGNOSIS — E24.0 CUSHING'S DISEASE (HCC): ICD-10-CM

## 2025-04-27 LAB
COLLECT DURATION TIME UR: 24 HR
CORTIS F 24H UR-MRATE: 26 UG/24 HR (ref 5–64)
CORTIS F UR-MCNC: 37 UG/L
SPECIMEN VOL ?TM UR: 700 ML

## 2025-04-28 ENCOUNTER — RESULTS FOLLOW-UP (OUTPATIENT)
Age: 70
End: 2025-04-28

## 2025-04-28 LAB — CORTIS SAL-MCNC: 0.11 UG/DL

## 2025-04-29 LAB — CORTIS SAL-MCNC: 0.11 UG/DL

## 2025-05-02 ENCOUNTER — HOSPITAL ENCOUNTER (OUTPATIENT)
Facility: HOSPITAL | Age: 70
Discharge: HOME | End: 2025-05-05

## 2025-05-02 VITALS
WEIGHT: 240 LBS | HEART RATE: 75 BPM | SYSTOLIC BLOOD PRESSURE: 124 MMHG | BODY MASS INDEX: 31.81 KG/M2 | HEIGHT: 73 IN | DIASTOLIC BLOOD PRESSURE: 74 MMHG

## 2025-05-02 DIAGNOSIS — C61 PROSTATE CANCER (HCC): Primary | ICD-10-CM

## 2025-05-02 ASSESSMENT — PAIN SCALES - GENERAL: PAINLEVEL_OUTOF10: 0

## 2025-05-02 NOTE — PROGRESS NOTES
[facility] [place] [facility]    Radiation Oncology Follow Up    Alonso Kruse  [id number]  1955     Diagnosis / Oncologic History   NCCN favorable intermediate risk prostate adenocarcinoma, clinical T1cN0 M0, initial PSA 6, [place] 3+4=7 (1 out of total 5/12 biopsies positive), [place] 0.63 s/p SBRT alone to 4000cGy/5fx completed 10/20/2023     AJCC Staging has been reviewed.  Interval History   Mr. Kruse arrives today for routine follow up 18 months from end of radiation treatments.    He saw Dr. Tobin on 3/5/2025 which time his PSA was 0.277.    He has some ongoing GI symptoms, unrelated to treatments, such as bloating and alternating constipation and diarrhea. He is seeing GI and has an endoscopy coming up. He also occasionally has some small amounts of blood on TP after episode of constipation, self limited. Had colonoscopy last year which was clean except for a couple benign polyps. Mild LUTS, mostly urgency and nocturia x3. On flomax once daily. Still swimming and biking every day, one or the other.     IPSS Questionnaire (AUA-7):  Over the past month…    1)  How often have you had a sensation of not emptying your bladder completely after you finish urinating?  0 - Not at all   2)  How often have you had to urinate again less than two hours after you finished urinating? 0 - Not at all   3)  How often have you found you stopped and started again several times when you urinated?  0 - Not at all   4) How difficult have you found it to postpone urination?  3 - About half the time   5) How often have you had a weak urinary stream?  0 - Not at all   6) How often have you had to push or strain to begin urination?  0 - Not at all   7) How many times did you most typically get up to urinate from the time you went to bed until the time you got up in the morning?  3 - 3 times   Total Score:  6     Urinary QOL: did not fill out    Sexual Health Inventory for Men: did not

## 2025-05-07 NOTE — TELEPHONE ENCOUNTER
----- Message from Dr. Racquel Parikh MD sent at 5/7/2025 12:37 PM EDT -----  I called patient no answer.   Amanda - when patient calls back, please take in 2 hour time range (Mon-Weds or Friday) that he is available for me to call back to review results.     I will explain these findings.     - high salivary cortisol for midnight x 2  - urine cortisol NL but only 700 cc collected  - lipid rich adenomas on CT 7/2024  - prior DST high >5, DHEAS low in 2024, this suggests concern for autonomous cortisol secretion   - plasma met NL 8/2024 (plasma cat high 7/2024)  I asked patient to keep scheduled follow up for 8/6/25 to discuss further.

## 2025-05-07 NOTE — TELEPHONE ENCOUNTER
Hi,    Pt returning missed call from Dr. Parikh. Please call pt back on 5/12/2025 between 1PM - 2PM.     Thank you.

## 2025-05-07 NOTE — RESULT ENCOUNTER NOTE
I called patient no answer.   Amanda - when patient calls back, please take in 2 hour time range (Mon-Weds or Friday) that he is available for me to call back to review results.     I will explain these findings.     - high salivary cortisol for midnight x 2  - urine cortisol NL but only 700 cc collected  - lipid rich adenomas on CT 7/2024  - prior DST high >5, DHEAS low in 2024, this suggests concern for autonomous cortisol secretion   - plasma met NL 8/2024 (plasma cat high 7/2024)  I asked patient to keep scheduled follow up for 8/6/25 to discuss further.

## 2025-05-10 DIAGNOSIS — E87.6 HYPOKALEMIA: ICD-10-CM

## 2025-05-12 RX ORDER — POTASSIUM CHLORIDE 750 MG/1
10 TABLET, EXTENDED RELEASE ORAL DAILY
Qty: 90 TABLET | Refills: 1 | Status: SHIPPED | OUTPATIENT
Start: 2025-05-12

## 2025-05-12 NOTE — TELEPHONE ENCOUNTER
Refill request received from [facility] for   Requested Prescriptions     Pending Prescriptions Disp Refills    KLOR-CON M10 10 MEQ extended release tablet [Pharmacy Med Name: KLOR-CON M10 TABLET] 90 tablet 2     Sig: TAKE 1 TABLET BY MOUTH EVERY DAY     Last office visit: 2/10/2025   Next office visit: 8/11/2025     Routed to KIMBERLY Bronson for review.

## 2025-05-14 ENCOUNTER — OFFICE VISIT (OUTPATIENT)
Age: 70
End: 2025-05-14
Payer: MEDICARE

## 2025-05-14 VITALS
WEIGHT: 243.6 LBS | DIASTOLIC BLOOD PRESSURE: 70 MMHG | HEIGHT: 73 IN | TEMPERATURE: 98.1 F | HEART RATE: 90 BPM | SYSTOLIC BLOOD PRESSURE: 102 MMHG | OXYGEN SATURATION: 94 % | BODY MASS INDEX: 32.29 KG/M2

## 2025-05-14 DIAGNOSIS — E21.5 PARATHYROID RELATED HYPERCALCEMIA: ICD-10-CM

## 2025-05-14 DIAGNOSIS — E11.29 TYPE 2 DIABETES MELLITUS WITH MICROALBUMINURIA, WITHOUT LONG-TERM CURRENT USE OF INSULIN (HCC): ICD-10-CM

## 2025-05-14 DIAGNOSIS — E83.52 PARATHYROID RELATED HYPERCALCEMIA: ICD-10-CM

## 2025-05-14 DIAGNOSIS — D35.02 BILATERAL ADRENAL ADENOMAS: Primary | ICD-10-CM

## 2025-05-14 DIAGNOSIS — D35.01 BILATERAL ADRENAL ADENOMAS: Primary | ICD-10-CM

## 2025-05-14 DIAGNOSIS — R80.9 TYPE 2 DIABETES MELLITUS WITH MICROALBUMINURIA, WITHOUT LONG-TERM CURRENT USE OF INSULIN (HCC): ICD-10-CM

## 2025-05-14 PROCEDURE — 2022F DILAT RTA XM EVC RTNOPTHY: CPT | Performed by: INTERNAL MEDICINE

## 2025-05-14 PROCEDURE — 99215 OFFICE O/P EST HI 40 MIN: CPT | Performed by: INTERNAL MEDICINE

## 2025-05-14 PROCEDURE — 1123F ACP DISCUSS/DSCN MKR DOCD: CPT | Performed by: INTERNAL MEDICINE

## 2025-05-14 PROCEDURE — G8427 DOCREV CUR MEDS BY ELIG CLIN: HCPCS | Performed by: INTERNAL MEDICINE

## 2025-05-14 PROCEDURE — 1160F RVW MEDS BY RX/DR IN RCRD: CPT | Performed by: INTERNAL MEDICINE

## 2025-05-14 PROCEDURE — 1125F AMNT PAIN NOTED PAIN PRSNT: CPT | Performed by: INTERNAL MEDICINE

## 2025-05-14 PROCEDURE — 1036F TOBACCO NON-USER: CPT | Performed by: INTERNAL MEDICINE

## 2025-05-14 PROCEDURE — 3078F DIAST BP <80 MM HG: CPT | Performed by: INTERNAL MEDICINE

## 2025-05-14 PROCEDURE — 1159F MED LIST DOCD IN RCRD: CPT | Performed by: INTERNAL MEDICINE

## 2025-05-14 PROCEDURE — 3074F SYST BP LT 130 MM HG: CPT | Performed by: INTERNAL MEDICINE

## 2025-05-14 PROCEDURE — G8417 CALC BMI ABV UP PARAM F/U: HCPCS | Performed by: INTERNAL MEDICINE

## 2025-05-14 PROCEDURE — 3044F HG A1C LEVEL LT 7.0%: CPT | Performed by: INTERNAL MEDICINE

## 2025-05-14 PROCEDURE — 3017F COLORECTAL CA SCREEN DOC REV: CPT | Performed by: INTERNAL MEDICINE

## 2025-05-14 RX ORDER — SPIRONOLACTONE 25 MG/1
25 TABLET ORAL DAILY
COMMUNITY
Start: 2025-03-20

## 2025-05-14 RX ORDER — OMEPRAZOLE 40 MG/1
40 CAPSULE, DELAYED RELEASE ORAL DAILY
COMMUNITY
Start: 2025-04-30

## 2025-05-14 NOTE — PROGRESS NOTES
Alonso Krues is a 70 y.o. male here for   Chief Complaint   Patient presents with    Adrenal Adenomas    Parathyroid related hypercalcemia       1. Have you been to the ER, urgent care clinic since your last visit?  Hospitalized since your last visit? -    2. Have you seen or consulted any other health care providers outside of the [facility] since your last visit?  Include any pap smears or colon screening.-      
gyn 3x/week   --- scheduled for EGD     BRIEF ROS   Gen: no fevers/chills  CV: no chest pain  Resp: no shortness of breath, cough   GI: no nausea, emesis, abdominal pain  Neuro: no confusion     LABS/STUDIES:   No results found for: \"[id number]\"  Lab Results   Component Value Date/Time    LABA1C 6.6 02/11/2025 11:10 AM    LABA1C 6.4 08/07/2024 09:53 AM    LABA1C 6.8 04/17/2024 08:04 AM    CREATININE 1.35 02/11/2025 11:10 AM    LABGLOM 56 02/11/2025 11:10 AM    LABGLOM 55 04/17/2024 08:04 AM     Lab Results   Component Value Date/Time    CHOL 108 08/07/2024 09:53 AM    TRIG 84 08/07/2024 09:53 AM    HDL 42 08/07/2024 09:53 AM     Lab Results   Component Value Date/Time    TSH 1.040 10/07/2024 11:15 AM   No results found for: \"CPEPLT\", \"CPEPTIDE\"    Current Outpatient Medications   Medication Sig Dispense Refill    omeprazole (PRILOSEC) 40 MG delayed release capsule Take 1 capsule by mouth daily      spironolactone (ALDACTONE) 25 MG tablet Take 1 tablet by mouth daily      KLOR-CON M10 10 MEQ extended release tablet TAKE 1 TABLET BY MOUTH EVERY DAY 90 tablet 1    semaglutide, 2 MG/DOSE, (OZEMPIC) 8 MG/3ML SOPN sc injection Inject 2 mg into the skin every 7 days 9 mL 3    carvedilol (COREG) 25 MG tablet Take 1 tablet by mouth 2 times daily 180 tablet 1    ELIQUIS 5 MG TABS tablet Take 1 tablet by mouth 2 times daily 180 tablet 3    rosuvastatin (CRESTOR) 20 MG tablet Take 1 tablet by mouth daily 90 tablet 1    Insulin Pen Needle (PEN NEEDLES) 32G X 4 MM MISC Use SC weekly 30 each 1    Cholecalciferol (VITAMIN D3) 125 MCG (5000 UT) TABS Take by mouth Every 2-3 days      tamsulosin (FLOMAX) 0.4 MG capsule       hydrALAZINE (APRESOLINE) 100 MG tablet TAKE 1 TABLET BY MOUTH THREE TIMES A DAY WITH FOOD      amLODIPine (NORVASC) 10 MG tablet Take 1 tablet by mouth daily      traMADol (ULTRAM) 50 MG tablet Take 1 tablet by mouth as needed.      flecainide (TAMBOCOR) 100 MG tablet Take 1 tablet by mouth as needed (Patient not

## 2025-05-16 ENCOUNTER — HOSPITAL ENCOUNTER (OUTPATIENT)
Facility: HOSPITAL | Age: 70
Discharge: HOME | End: 2025-05-19

## 2025-05-16 ENCOUNTER — TELEPHONE (OUTPATIENT)
Age: 70
End: 2025-05-16

## 2025-05-16 DIAGNOSIS — D35.01 BILATERAL ADRENAL ADENOMAS: ICD-10-CM

## 2025-05-16 DIAGNOSIS — D35.02 BILATERAL ADRENAL ADENOMAS: ICD-10-CM

## 2025-05-16 LAB — ACTH PLAS-MCNC: 3 PG/ML (ref 7.2–63.3)

## 2025-05-16 NOTE — TELEPHONE ENCOUNTER
Patient has 24hr urine container but states he has no orders to take it to [facility]. Please call with instructions

## 2025-05-19 NOTE — TELEPHONE ENCOUNTER
Attempted to call. Unsuccessful. Left msg for Alonso Kruse to give us a call back at the office. A callback number was left.

## 2025-05-19 NOTE — TELEPHONE ENCOUNTER
Pt called back and stated that labs was viewable for [facility] and he dropped off specimen last week. He stated lab result should be processing at this moment.

## 2025-05-22 LAB
COLLECT DURATION TIME UR: 24 HR
METANEPH 24H UR-MRATE: 112 UG/24 HR (ref 58–276)
METANEPHS 24H UR-MCNC: 86 UG/L
NORMETANEPHRINE 24H UR-MCNC: 335 UG/L
NORMETANEPHRINE 24H UR-MRATE: 436 UG/24 HR (ref 156–729)
SPECIMEN VOL ?TM UR: 1300 ML

## 2025-05-23 ENCOUNTER — RESULTS FOLLOW-UP (OUTPATIENT)
Age: 70
End: 2025-05-23

## 2025-05-25 NOTE — RESULT ENCOUNTER NOTE
Please inform  - normal urine hormone levels  - ACTH is low (expected) when adrenal glands at making higher than normal amount of cortisol  - will continue to monitor  - follow up as scheduled 8/2025.

## 2025-05-27 NOTE — TELEPHONE ENCOUNTER
----- Message from Dr. Racquel Parikh MD sent at 5/25/2025  1:39 PM EDT -----  Please inform  - normal urine hormone levels  - ACTH is low (expected) when adrenal glands at making higher than normal amount of cortisol  - will continue to monitor  - follow up as scheduled 8/2025.

## 2025-05-27 NOTE — TELEPHONE ENCOUNTER
Informed pt of Dr. Parikh's note. Pt verbalized understanding with no further questions or concerns at this time.

## 2025-05-28 ENCOUNTER — TELEPHONE (OUTPATIENT)
Age: 70
End: 2025-05-28

## 2025-05-28 DIAGNOSIS — E87.6 HYPOKALEMIA: ICD-10-CM

## 2025-05-28 RX ORDER — POTASSIUM CHLORIDE 750 MG/1
10 TABLET, EXTENDED RELEASE ORAL DAILY
Qty: 90 TABLET | Refills: 1 | Status: SHIPPED | OUTPATIENT
Start: 2025-05-28

## 2025-05-28 NOTE — TELEPHONE ENCOUNTER
Patient requesting a refill of KLOR-CON M10 10 MEQ extended release tablet to be sent to the [facility] on file. He states he only has one tablet left for tomorrow and then will be completely out of medication.

## 2025-05-28 NOTE — TELEPHONE ENCOUNTER
Refill request received from [facility] for   Requested Prescriptions     Pending Prescriptions Disp Refills    potassium chloride (KLOR-CON M10) 10 MEQ extended release tablet 90 tablet 1     Sig: Take 1 tablet by mouth daily     Last office visit: 2/10/2025   Next office visit: 8/11/2025     Routed to KIMBERLY Bronson for review.         Edy Guerrero Cma

## 2025-06-12 DIAGNOSIS — E78.00 HYPERCHOLESTEREMIA: ICD-10-CM

## 2025-06-12 RX ORDER — ROSUVASTATIN CALCIUM 20 MG/1
20 TABLET, COATED ORAL DAILY
Qty: 90 TABLET | Refills: 0 | Status: SHIPPED | OUTPATIENT
Start: 2025-06-12

## 2025-06-12 NOTE — TELEPHONE ENCOUNTER
Refill request received from [facility] for   Requested Prescriptions     Pending Prescriptions Disp Refills    rosuvastatin (CRESTOR) 20 MG tablet [Pharmacy Med Name: ROSUVASTATIN CALCIUM 20 MG TAB] 90 tablet 1     Sig: TAKE 1 TABLET BY MOUTH EVERY DAY     Last office visit: 2/10/2025   Next office visit: 8/11/2025     Routed to KIMBERLY Bronson for review.

## 2025-06-13 ENCOUNTER — TELEPHONE (OUTPATIENT)
Age: 70
End: 2025-06-13

## 2025-06-13 NOTE — TELEPHONE ENCOUNTER
----- Message from Rebecca GRANDA sent at 6/13/2025 12:18 PM EDT -----  Regarding: ECC Message to Provider  ECC Message to Provider    Relationship to Patient: Self     Additional Information: Patient have concern about his medicine, regards on the presciption and pharmacy for heart medicine from heart doctor.  --------------------------------------------------------------------------------------------------------------------------    Call Back Information: OK to leave message on voicemail  Preferred Call Back Number: Phone: [phone]

## 2025-06-13 NOTE — TELEPHONE ENCOUNTER
Staff called patient and read Dr. Blake's message about the medication reactions verbatim. The patient asked who Dr. Blake is and staff expressed he is another provider in the same office as your pcp. Patient expressed understanding of the message. Patient has a virtual scheduled with pcp on Monday.     ALEX Grayson

## 2025-06-13 NOTE — TELEPHONE ENCOUNTER
Called and spoke with pt.  Verified identity x2.    Pt is reporting a potential problem with two of his medications and would like advice/clarification     Rosuvastatin 20mg  Spironolactone,      prescribed by Cardiologist Dr Cook [id number]    States he went to p/u his Spironolactone 25mg today   Reports Pharmacist told him that taking that, and  Rosuvastatin can cause calcium issues    Advised pt that Guerline is out of the office this week, and that this message will be forwarded to one of our other providers  Pt verbalized understanding.      Brea Wu LPN

## 2025-06-16 ENCOUNTER — TELEMEDICINE (OUTPATIENT)
Age: 70
End: 2025-06-16
Payer: MEDICARE

## 2025-06-16 DIAGNOSIS — E78.00 HYPERCHOLESTEREMIA: Primary | ICD-10-CM

## 2025-06-16 DIAGNOSIS — I10 ESSENTIAL (PRIMARY) HYPERTENSION: ICD-10-CM

## 2025-06-16 DIAGNOSIS — D35.01 BILATERAL ADRENAL ADENOMAS: ICD-10-CM

## 2025-06-16 DIAGNOSIS — D35.02 BILATERAL ADRENAL ADENOMAS: ICD-10-CM

## 2025-06-16 PROCEDURE — G8427 DOCREV CUR MEDS BY ELIG CLIN: HCPCS | Performed by: PHYSICIAN ASSISTANT

## 2025-06-16 PROCEDURE — 1159F MED LIST DOCD IN RCRD: CPT | Performed by: PHYSICIAN ASSISTANT

## 2025-06-16 PROCEDURE — 1123F ACP DISCUSS/DSCN MKR DOCD: CPT | Performed by: PHYSICIAN ASSISTANT

## 2025-06-16 PROCEDURE — 99214 OFFICE O/P EST MOD 30 MIN: CPT | Performed by: PHYSICIAN ASSISTANT

## 2025-06-16 PROCEDURE — G2211 COMPLEX E/M VISIT ADD ON: HCPCS | Performed by: PHYSICIAN ASSISTANT

## 2025-06-16 PROCEDURE — 3017F COLORECTAL CA SCREEN DOC REV: CPT | Performed by: PHYSICIAN ASSISTANT

## 2025-06-16 ASSESSMENT — ENCOUNTER SYMPTOMS
COUGH: 0
SHORTNESS OF BREATH: 0

## 2025-06-16 NOTE — PROGRESS NOTES
I have reviewed all needed documentation in preparation for visit. Verified patient by name and date of birth  Chief Complaint   Patient presents with    Discuss Medications       There were no vitals filed for this visit.    Health Maintenance Due   Topic Date Due    Diabetic retinal exam  Never done    DTaP/Tdap/Td vaccine (1 - Tdap) Never done    Respiratory Syncytial Virus (RSV) Pregnant or age 60 yrs+ (1 - Risk 60-74 years 1-dose series) Never done    Prostate Specific Antigen (PSA) Screening or Monitoring  01/05/2022    Pneumococcal 50+ years Vaccine (2 of 2 - PCV) 10/18/2022    Diabetic foot exam  06/14/2023    COVID-19 Vaccine (8 - 2024-25 season) 09/01/2024    Diabetic Alb to Cr ratio (uACR) test  01/29/2025     \"Have you been to the ER, urgent care clinic since your last visit?  Hospitalized since your last visit?\"    NO    “Have you seen or consulted any other health care providers outside of [facility] since your last visit?”    NO            Click Here for Release of Records Request         Edy Guerrero [facility]

## 2025-06-16 NOTE — PROGRESS NOTES
Alonso Kruse is a 70 y.o. male who was seen by synchronous (real-time) audio-video technology on 6/16/2025    Consent: Alonso Kruse, who was seen by synchronous (real-time) audio-video technology, and/or his healthcare decision maker, is aware that this patient-initiated, Telehealth encounter on 6/16/2025 is a billable service, with coverage as determined by his insurance carrier. He is aware that he may receive a bill and has provided verbal consent to proceed: YES  Subjective:   Alonso Kruse is a 70 y.o. male who was seen for Discuss Medications    Pharmacist  noted concern for calcium levels with potential interaction between Rosuvastatin and Spironolactone. Calcium levels have normalized at last check and pt is feeling well.     Pt requests reassurance on whether it's ok to continue these meds.     Currently undergoing work up for adrenal adenomas B. Feeling well.     Review of Systems   Constitutional:  Negative for fever.   Respiratory:  Negative for cough and shortness of breath.    Cardiovascular:  Negative for chest pain and palpitations.   Neurological:  Negative for headaches.   Psychiatric/Behavioral:  Negative for dysphoric mood.      Objective:         6/16/2025     3:23 PM   Patient-Reported Vitals   Patient-Reported Weight 235lb   Patient-Reported Height 6'1      General: alert, cooperative, no distress   Mental  status: normal mood, behavior, speech, dress, motor activity, and thought processes, able to follow commands   HENT: NCAT   Neck: no visualized mass   Resp: no respiratory distress   Neuro: no gross deficits   Skin: no discoloration or lesions of concern on visible areas   Psychiatric: normal affect, consistent with stated mood, no evidence of hallucinations     Assessment & Plan:       ICD-10-CM    1. Hypercholesteremia  E78.00       2. Bilateral adrenal adenomas  D35.01     D35.02       3. Essential (primary) hypertension  I10         Reviewed labs with pt and encouraged him to

## 2025-07-29 ENCOUNTER — HOSPITAL ENCOUNTER (OUTPATIENT)
Facility: HOSPITAL | Age: 70
Setting detail: OUTPATIENT SURGERY
Discharge: HOME | End: 2025-07-29
Attending: SPECIALIST | Admitting: SPECIALIST
Payer: MEDICARE

## 2025-07-29 ENCOUNTER — ANESTHESIA EVENT (OUTPATIENT)
Facility: HOSPITAL | Age: 70
End: 2025-07-29
Payer: MEDICARE

## 2025-07-29 ENCOUNTER — ANESTHESIA (OUTPATIENT)
Facility: HOSPITAL | Age: 70
End: 2025-07-29
Payer: MEDICARE

## 2025-07-29 VITALS
RESPIRATION RATE: 13 BRPM | OXYGEN SATURATION: 94 % | HEART RATE: 65 BPM | WEIGHT: 230 LBS | HEIGHT: 73 IN | TEMPERATURE: 97.3 F | SYSTOLIC BLOOD PRESSURE: 126 MMHG | BODY MASS INDEX: 30.48 KG/M2 | DIASTOLIC BLOOD PRESSURE: 80 MMHG

## 2025-07-29 PROCEDURE — 88305 TISSUE EXAM BY PATHOLOGIST: CPT

## 2025-07-29 PROCEDURE — 3700000001 HC ADD 15 MINUTES (ANESTHESIA): Performed by: SPECIALIST

## 2025-07-29 PROCEDURE — 3700000000 HC ANESTHESIA ATTENDED CARE: Performed by: SPECIALIST

## 2025-07-29 PROCEDURE — 6360000002 HC RX W HCPCS: Performed by: NURSE ANESTHETIST, CERTIFIED REGISTERED

## 2025-07-29 PROCEDURE — 3600007512: Performed by: SPECIALIST

## 2025-07-29 PROCEDURE — 2720000010 HC SURG SUPPLY STERILE: Performed by: SPECIALIST

## 2025-07-29 PROCEDURE — 7100000011 HC PHASE II RECOVERY - ADDTL 15 MIN: Performed by: SPECIALIST

## 2025-07-29 PROCEDURE — 2580000003 HC RX 258: Performed by: NURSE ANESTHETIST, CERTIFIED REGISTERED

## 2025-07-29 PROCEDURE — 3600007502: Performed by: SPECIALIST

## 2025-07-29 PROCEDURE — 7100000010 HC PHASE II RECOVERY - FIRST 15 MIN: Performed by: SPECIALIST

## 2025-07-29 RX ORDER — SODIUM CHLORIDE 0.9 % (FLUSH) 0.9 %
5-40 SYRINGE (ML) INJECTION EVERY 12 HOURS SCHEDULED
Status: DISCONTINUED | OUTPATIENT
Start: 2025-07-29 | End: 2025-07-29 | Stop reason: HOSPADM

## 2025-07-29 RX ORDER — SODIUM CHLORIDE 9 MG/ML
INJECTION, SOLUTION INTRAVENOUS PRN
Status: DISCONTINUED | OUTPATIENT
Start: 2025-07-29 | End: 2025-07-29 | Stop reason: HOSPADM

## 2025-07-29 RX ORDER — SODIUM CHLORIDE 0.9 % (FLUSH) 0.9 %
5-40 SYRINGE (ML) INJECTION PRN
Status: DISCONTINUED | OUTPATIENT
Start: 2025-07-29 | End: 2025-07-29 | Stop reason: HOSPADM

## 2025-07-29 RX ORDER — LIDOCAINE HYDROCHLORIDE 20 MG/ML
INJECTION, SOLUTION EPIDURAL; INFILTRATION; INTRACAUDAL; PERINEURAL
Status: DISCONTINUED | OUTPATIENT
Start: 2025-07-29 | End: 2025-07-29 | Stop reason: SDUPTHER

## 2025-07-29 RX ORDER — SODIUM CHLORIDE 9 MG/ML
INJECTION, SOLUTION INTRAVENOUS CONTINUOUS
Status: DISCONTINUED | OUTPATIENT
Start: 2025-07-29 | End: 2025-07-29 | Stop reason: HOSPADM

## 2025-07-29 RX ORDER — SODIUM CHLORIDE 9 MG/ML
INJECTION, SOLUTION INTRAVENOUS
Status: DISCONTINUED | OUTPATIENT
Start: 2025-07-29 | End: 2025-07-29 | Stop reason: SDUPTHER

## 2025-07-29 RX ADMIN — LIDOCAINE HYDROCHLORIDE 100 MG: 20 INJECTION, SOLUTION EPIDURAL; INFILTRATION; INTRACAUDAL; PERINEURAL at 16:25

## 2025-07-29 RX ADMIN — PROPOFOL 50 MG: 10 INJECTION, EMULSION INTRAVENOUS at 16:32

## 2025-07-29 RX ADMIN — SODIUM CHLORIDE: 9 INJECTION, SOLUTION INTRAVENOUS at 16:21

## 2025-07-29 RX ADMIN — PROPOFOL 50 MG: 10 INJECTION, EMULSION INTRAVENOUS at 16:30

## 2025-07-29 RX ADMIN — PROPOFOL 50 MG: 10 INJECTION, EMULSION INTRAVENOUS at 16:36

## 2025-07-29 RX ADMIN — PROPOFOL 50 MG: 10 INJECTION, EMULSION INTRAVENOUS at 16:34

## 2025-07-29 RX ADMIN — PROPOFOL 50 MG: 10 INJECTION, EMULSION INTRAVENOUS at 16:28

## 2025-07-29 RX ADMIN — PROPOFOL 100 MG: 10 INJECTION, EMULSION INTRAVENOUS at 16:25

## 2025-07-29 RX ADMIN — PROPOFOL 50 MG: 10 INJECTION, EMULSION INTRAVENOUS at 16:26

## 2025-07-29 ASSESSMENT — PAIN SCALES - GENERAL: PAINLEVEL_OUTOF10: 0

## 2025-07-29 NOTE — OP NOTE
[place] [address]                 NAME:  Alonso Kruse   :   1955   MRN:   [id number]     Date/Time:  2025 4:47 PM    Esophagogastroduodenoscopy (EGD) Procedure Note    :  Jonatan Cruz MD    Staff: Circulator: Marisabel Guadalupe RN  Endoscopy Technician: Radha Yeboah     Referring Provider:  Guerline Steiner PA-C    Anethesia/Sedation:  MAC anesthesia Propofol    Preoperative diagnosis: Constipation, unspecified constipation type [K59.00]  Acid indigestion [K30], weight loss      Procedure Details     After infom consent was obtained for the procedure, with all risks and benefits of procedure explained the patient was taken to the endoscopy suite and placed in the left lateral decubitus position.  Following sequential administration of sedation as per above, the [id number] gastroscope was inserted into the mouth and advanced under direct vision to second portion of the duodenum.  A careful inspection was made as the gastroscope was withdrawn, including a retroflexed view of the proximal stomach; findings and interventions are described below.      Findings:  Esophagus:normal  Stomach:Patchy erythema antrum, biopsies done  Duodenum/jejunum:Few erosions in descending duodenum, biopsies done      Therapies:  none    Specimens:  ID Type Source Tests Collected by Time Destination   1 : descending biopsy Tissue Duodenum SURGICAL PATHOLOGY Jonatan Cruz MD 2025 1633    2 : antrum biopsy Tissue Gastric SURGICAL PATHOLOGY Jonatan Cruz MD 2025 1634               EBL: None    Complications:   None; patient tolerated the procedure well.           Impression:    See Postoperative diagnosis above    Recommendations:  -Acid suppression with a proton pump inhibitor., -Await pathology.     Discharge disposition:  Home in the company of  when able to ambulate    Jonatan Cruz MD

## 2025-07-29 NOTE — DISCHARGE INSTRUCTIONS
Alonso Kruse  [id number]  1955    EGD DISCHARGE INSTRUCTIONS  Discomfort:  Sore throat- throat lozenges or warm salt water gargle  redness at IV site- apply warm compress to area; if redness or soreness persist- contact your physician  Gaseous discomfort- walking, belching will help relieve any discomfort    DIET  You may resume your regular diet - however -  remember your colon is empty and a heavy meal will produce gas.   Avoid these foods:  vegetables, fried / greasy foods, carbonated drinks  You may not drink alcoholic beverages for at least 12 hours    MEDICATIONS   Regarding Aspirin or Nonsteroidal medications specifically, please see below.    ACTIVITY  You may resume your normal daily activities.   Spend the remainder of the day resting -  avoid any strenuous activity.  You may not operate a vehicle for 12 hours  You may not engage in an occupation involving machinery or appliances for rest of today.  Avoid making any critical decisions for at least 24 hour    CALL M.D.  ANY SIGN OF   Increasing pain, nausea, vomiting  Abdominal distension (swelling)  New increased bleeding (oral or rectal)  Fever (chills)  Pain in chest area  Bloody discharge from nose or mouth  Shortness of breath    You may not  take any Advil, Aspirin, Ibuprofen, Motrin, Aleve, or Goody’s , ONLY  Tylenol as needed for pain.    Post procedure diagnosis: duodenitis  Post-procedure recommendations: Await biopsy results    Follow-up Instructions:   Call Dr. Cruz  Results of procedure / biopsy in 10 days, follow up with WOODROW Rocha in 3 months  Telephone #  [phone]        DISCHARGE SUMMARY from Nurse    The following personal items collected during your admission are returned to you:   Dental Appliance:    Vision:    Hearing Aid:    Jewelry:    Clothing:    Other Valuables:    Valuables sent to safe: Dose (mL/hr) Propofol : *50 mg

## 2025-07-29 NOTE — ANESTHESIA POSTPROCEDURE EVALUATION
Department of Anesthesiology  Postprocedure Note    Patient: Alonso Kruse  MRN: [id number]  YOB: 1955  Date of evaluation: 7/29/2025    Procedure Summary       Date: 07/29/25 Room / Location: [facility] 04 / [facility] ENDOSCOPY    Anesthesia Start: 1621 Anesthesia Stop: 1639    Procedure: ESOPHAGOGASTRODUODENOSCOPY (Upper GI Region) Diagnosis:       Constipation, unspecified constipation type      Acid indigestion      (Constipation, unspecified constipation type [K59.00])      (Acid indigestion [K30])    Surgeons: Jonatan Cruz MD Responsible Provider: Tawnya Frias MD    Anesthesia Type: MAC ASA Status: 2            Anesthesia Type: MAC    Augustus Phase I: Augustus Score: 10    Augustus Phase II: Augustus Score: 10    Anesthesia Post Evaluation    Level of consciousness: awake  Airway patency: patent  Nausea & Vomiting: no nausea  Cardiovascular status: hemodynamically stable  Respiratory status: acceptable  Hydration status: euvolemic  Pain management: adequate        No notable events documented.

## 2025-07-29 NOTE — PROGRESS NOTES
Initial RN admission and assessment performed and documented in Endoscopy navigator.     Patient evaluated by anesthesia in pre-procedure holding.     All procedural vital signs, airway assessment, and level of consciousness information monitored and recorded by anesthesia staff on the anesthesia record.     Report received from CRNA post procedure.  Patient transported to recovery area by RN.    Endoscopy post procedure time out was performed and specimens were verified with physician.    Endoscope was pre-cleaned at bedside immediately following procedure by maldonado tech..

## 2025-07-29 NOTE — H&P
Pre-endoscopy H and P     The patient was seen and examined in the endoscopy suite. The airway was assessed and docuemented. The problem list and medications were reviewed.     Patient Active Problem List   Diagnosis    Chronic bilateral low back pain, unspecified whether sciatica present    Chronic knee pain, unspecified laterality    Paroxysmal atrial fibrillation (HCC)    MAI on CPAP    Hypercholesteremia    Chronic prescription opiate use    Type 2 diabetes mellitus with microalbuminuria, without long-term current use of insulin (HCC)    Benign prostatic hyperplasia with lower urinary tract symptoms, symptom details unspecified    Severe obesity (HCC)    Essential (primary) hypertension    Hypokalemia    Prostate cancer (HCC)    Parathyroid related hypercalcemia    Adrenal nodule    CKD stage 3a, GFR 45-59 ml/min (HCC)    Bilateral adrenal adenomas    Atrial fibrillation (HCC)     Social History     Socioeconomic History    Marital status: Single     Spouse name: Not on file    Number of children: Not on file    Years of education: Not on file    Highest education level: Not on file   Occupational History    Not on file   Tobacco Use    Smoking status: Never    Smokeless tobacco: Never   Vaping Use    Vaping status: Never Used   Substance and Sexual Activity    Alcohol use: Not Currently    Drug use: Never    Sexual activity: Not on file   Other Topics Concern    Not on file   Social History Narrative    Not on file     Social Drivers of Health     Financial Resource Strain: Low Risk  (8/7/2024)    Overall Financial Resource Strain (CARDIA)     Difficulty of Paying Living Expenses: Not hard at all   Food Insecurity: No Food Insecurity (2/10/2025)    Hunger Vital Sign     Worried About Running Out of Food in the Last Year: Never true     Ran Out of Food in the Last Year: Never true   Transportation Needs: No Transportation Needs (2/10/2025)    PRAPARE - Transportation     Lack of Transportation (Medical): No

## 2025-08-06 ENCOUNTER — OFFICE VISIT (OUTPATIENT)
Age: 70
End: 2025-08-06
Payer: MEDICARE

## 2025-08-06 VITALS
HEIGHT: 73 IN | DIASTOLIC BLOOD PRESSURE: 81 MMHG | SYSTOLIC BLOOD PRESSURE: 112 MMHG | OXYGEN SATURATION: 97 % | WEIGHT: 226.6 LBS | BODY MASS INDEX: 30.03 KG/M2 | HEART RATE: 85 BPM | TEMPERATURE: 98.2 F

## 2025-08-06 DIAGNOSIS — E83.52 HYPERCALCEMIA: Primary | ICD-10-CM

## 2025-08-06 PROCEDURE — 3079F DIAST BP 80-89 MM HG: CPT | Performed by: INTERNAL MEDICINE

## 2025-08-06 PROCEDURE — 3017F COLORECTAL CA SCREEN DOC REV: CPT | Performed by: INTERNAL MEDICINE

## 2025-08-06 PROCEDURE — 1036F TOBACCO NON-USER: CPT | Performed by: INTERNAL MEDICINE

## 2025-08-06 PROCEDURE — G8428 CUR MEDS NOT DOCUMENT: HCPCS | Performed by: INTERNAL MEDICINE

## 2025-08-06 PROCEDURE — 99214 OFFICE O/P EST MOD 30 MIN: CPT | Performed by: INTERNAL MEDICINE

## 2025-08-06 PROCEDURE — G8417 CALC BMI ABV UP PARAM F/U: HCPCS | Performed by: INTERNAL MEDICINE

## 2025-08-06 PROCEDURE — 1126F AMNT PAIN NOTED NONE PRSNT: CPT | Performed by: INTERNAL MEDICINE

## 2025-08-06 PROCEDURE — 3074F SYST BP LT 130 MM HG: CPT | Performed by: INTERNAL MEDICINE

## 2025-08-06 PROCEDURE — 1123F ACP DISCUSS/DSCN MKR DOCD: CPT | Performed by: INTERNAL MEDICINE

## 2025-08-11 ENCOUNTER — OFFICE VISIT (OUTPATIENT)
Age: 70
End: 2025-08-11
Payer: MEDICARE

## 2025-08-11 VITALS
SYSTOLIC BLOOD PRESSURE: 132 MMHG | OXYGEN SATURATION: 98 % | RESPIRATION RATE: 18 BRPM | DIASTOLIC BLOOD PRESSURE: 84 MMHG | HEIGHT: 73 IN | TEMPERATURE: 98 F | WEIGHT: 227 LBS | BODY MASS INDEX: 30.09 KG/M2

## 2025-08-11 DIAGNOSIS — R80.9 TYPE 2 DIABETES MELLITUS WITH MICROALBUMINURIA, WITHOUT LONG-TERM CURRENT USE OF INSULIN (HCC): ICD-10-CM

## 2025-08-11 DIAGNOSIS — R53.83 FATIGUE, UNSPECIFIED TYPE: ICD-10-CM

## 2025-08-11 DIAGNOSIS — E78.00 HYPERCHOLESTEREMIA: ICD-10-CM

## 2025-08-11 DIAGNOSIS — I10 ESSENTIAL (PRIMARY) HYPERTENSION: ICD-10-CM

## 2025-08-11 DIAGNOSIS — E11.29 TYPE 2 DIABETES MELLITUS WITH MICROALBUMINURIA, WITHOUT LONG-TERM CURRENT USE OF INSULIN (HCC): ICD-10-CM

## 2025-08-11 DIAGNOSIS — C61 PROSTATE CANCER (HCC): ICD-10-CM

## 2025-08-11 DIAGNOSIS — E87.6 HYPOKALEMIA: ICD-10-CM

## 2025-08-11 DIAGNOSIS — E66.01 SEVERE OBESITY (HCC): ICD-10-CM

## 2025-08-11 DIAGNOSIS — N18.31 CKD STAGE 3A, GFR 45-59 ML/MIN (HCC): ICD-10-CM

## 2025-08-11 DIAGNOSIS — E66.9 OBESITY (BMI 30-39.9): ICD-10-CM

## 2025-08-11 DIAGNOSIS — E11.29 TYPE 2 DIABETES MELLITUS WITH MICROALBUMINURIA, WITHOUT LONG-TERM CURRENT USE OF INSULIN (HCC): Primary | ICD-10-CM

## 2025-08-11 DIAGNOSIS — R80.9 TYPE 2 DIABETES MELLITUS WITH MICROALBUMINURIA, WITHOUT LONG-TERM CURRENT USE OF INSULIN (HCC): Primary | ICD-10-CM

## 2025-08-11 DIAGNOSIS — I48.0 PAROXYSMAL ATRIAL FIBRILLATION (HCC): ICD-10-CM

## 2025-08-11 PROCEDURE — 3044F HG A1C LEVEL LT 7.0%: CPT | Performed by: PHYSICIAN ASSISTANT

## 2025-08-11 PROCEDURE — 1159F MED LIST DOCD IN RCRD: CPT | Performed by: PHYSICIAN ASSISTANT

## 2025-08-11 PROCEDURE — 1123F ACP DISCUSS/DSCN MKR DOCD: CPT | Performed by: PHYSICIAN ASSISTANT

## 2025-08-11 PROCEDURE — G2211 COMPLEX E/M VISIT ADD ON: HCPCS | Performed by: PHYSICIAN ASSISTANT

## 2025-08-11 PROCEDURE — 3075F SYST BP GE 130 - 139MM HG: CPT | Performed by: PHYSICIAN ASSISTANT

## 2025-08-11 PROCEDURE — G8417 CALC BMI ABV UP PARAM F/U: HCPCS | Performed by: PHYSICIAN ASSISTANT

## 2025-08-11 PROCEDURE — 99214 OFFICE O/P EST MOD 30 MIN: CPT | Performed by: PHYSICIAN ASSISTANT

## 2025-08-11 PROCEDURE — 3017F COLORECTAL CA SCREEN DOC REV: CPT | Performed by: PHYSICIAN ASSISTANT

## 2025-08-11 PROCEDURE — 3079F DIAST BP 80-89 MM HG: CPT | Performed by: PHYSICIAN ASSISTANT

## 2025-08-11 PROCEDURE — 2022F DILAT RTA XM EVC RTNOPTHY: CPT | Performed by: PHYSICIAN ASSISTANT

## 2025-08-11 PROCEDURE — G8427 DOCREV CUR MEDS BY ELIG CLIN: HCPCS | Performed by: PHYSICIAN ASSISTANT

## 2025-08-11 PROCEDURE — 1126F AMNT PAIN NOTED NONE PRSNT: CPT | Performed by: PHYSICIAN ASSISTANT

## 2025-08-11 PROCEDURE — 1036F TOBACCO NON-USER: CPT | Performed by: PHYSICIAN ASSISTANT

## 2025-08-11 RX ORDER — POTASSIUM CHLORIDE 750 MG/1
10 TABLET, EXTENDED RELEASE ORAL DAILY
Qty: 90 TABLET | Refills: 1 | Status: SHIPPED | OUTPATIENT
Start: 2025-08-11

## 2025-08-11 RX ORDER — ROSUVASTATIN CALCIUM 20 MG/1
20 TABLET, COATED ORAL DAILY
Qty: 90 TABLET | Refills: 1 | Status: SHIPPED | OUTPATIENT
Start: 2025-08-11

## 2025-08-11 RX ORDER — APIXABAN 5 MG/1
5 TABLET, FILM COATED ORAL 2 TIMES DAILY
Qty: 180 TABLET | Refills: 3 | Status: SHIPPED | OUTPATIENT
Start: 2025-08-11

## 2025-08-11 RX ORDER — CARVEDILOL 25 MG/1
25 TABLET ORAL 2 TIMES DAILY
Qty: 180 TABLET | Refills: 1 | Status: SHIPPED | OUTPATIENT
Start: 2025-08-11

## 2025-08-11 ASSESSMENT — ENCOUNTER SYMPTOMS
SHORTNESS OF BREATH: 0
COUGH: 0

## 2025-08-13 LAB
ALBUMIN SERPL-MCNC: 4.4 G/DL (ref 3.8–4.8)
ALBUMIN/CREAT UR: 48 MG/G CREAT (ref 0–29)
ALP SERPL-CCNC: 86 IU/L (ref 44–121)
ALT SERPL-CCNC: 34 IU/L (ref 0–44)
AST SERPL-CCNC: 24 IU/L (ref 0–40)
BASOPHILS # BLD AUTO: 0 X10E3/UL (ref 0–0.2)
BASOPHILS NFR BLD AUTO: 1 %
BILIRUB SERPL-MCNC: 1.4 MG/DL (ref 0–1.2)
BUN SERPL-MCNC: 17 MG/DL (ref 8–27)
BUN/CREAT SERPL: 13 (ref 10–24)
CALCIUM SERPL-MCNC: 10.4 MG/DL (ref 8.6–10.2)
CHLORIDE SERPL-SCNC: 99 MMOL/L (ref 96–106)
CHOLEST SERPL-MCNC: 98 MG/DL (ref 100–199)
CO2 SERPL-SCNC: 22 MMOL/L (ref 20–29)
CREAT SERPL-MCNC: 1.34 MG/DL (ref 0.76–1.27)
CREAT UR-MCNC: 139.1 MG/DL
EGFRCR SERPLBLD CKD-EPI 2021: 56 ML/MIN/1.73
EOSINOPHIL # BLD AUTO: 0.1 X10E3/UL (ref 0–0.4)
EOSINOPHIL NFR BLD AUTO: 1 %
ERYTHROCYTE [DISTWIDTH] IN BLOOD BY AUTOMATED COUNT: 14 % (ref 11.6–15.4)
EST. AVERAGE GLUCOSE BLD GHB EST-MCNC: 120 MG/DL
GLOBULIN SER CALC-MCNC: 2.7 G/DL (ref 1.5–4.5)
GLUCOSE SERPL-MCNC: 97 MG/DL (ref 70–99)
HBA1C MFR BLD: 5.8 % (ref 4.8–5.6)
HCT VFR BLD AUTO: 51.5 % (ref 37.5–51)
HDLC SERPL-MCNC: 41 MG/DL
HGB BLD-MCNC: 16.3 G/DL (ref 13–17.7)
IMM GRANULOCYTES # BLD AUTO: 0 X10E3/UL (ref 0–0.1)
IMM GRANULOCYTES NFR BLD AUTO: 0 %
IMP & REVIEW OF LAB RESULTS: NORMAL
LDLC SERPL CALC-MCNC: 43 MG/DL (ref 0–99)
LYMPHOCYTES # BLD AUTO: 1.1 X10E3/UL (ref 0.7–3.1)
LYMPHOCYTES NFR BLD AUTO: 24 %
Lab: NORMAL
MCH RBC QN AUTO: 28.6 PG (ref 26.6–33)
MCHC RBC AUTO-ENTMCNC: 31.7 G/DL (ref 31.5–35.7)
MCV RBC AUTO: 91 FL (ref 79–97)
MICROALBUMIN UR-MCNC: 66.4 UG/ML
MONOCYTES # BLD AUTO: 0.6 X10E3/UL (ref 0.1–0.9)
MONOCYTES NFR BLD AUTO: 13 %
NEUTROPHILS # BLD AUTO: 2.9 X10E3/UL (ref 1.4–7)
NEUTROPHILS NFR BLD AUTO: 61 %
PLATELET # BLD AUTO: 215 X10E3/UL (ref 150–450)
POTASSIUM SERPL-SCNC: 4.5 MMOL/L (ref 3.5–5.2)
PROT SERPL-MCNC: 7.1 G/DL (ref 6–8.5)
RBC # BLD AUTO: 5.69 X10E6/UL (ref 4.14–5.8)
REPORT: NORMAL
REPORT: NORMAL
SODIUM SERPL-SCNC: 138 MMOL/L (ref 134–144)
TRIGL SERPL-MCNC: 64 MG/DL (ref 0–149)
TSH SERPL DL<=0.005 MIU/L-ACNC: 2.18 UIU/ML (ref 0.45–4.5)
VLDLC SERPL CALC-MCNC: 14 MG/DL (ref 5–40)
WBC # BLD AUTO: 4.8 X10E3/UL (ref 3.4–10.8)

## (undated) DEVICE — ORISE PROKNIFE 3.0 MM ELECTRODE: Brand: ORISE™ PROKNIFE

## (undated) DEVICE — SET GRAV CK VLV NEEDLESS ST 3 GANGED 4WAY STPCOCK HI FLO 10

## (undated) DEVICE — ENDOSCOPIC KIT COMPLIANCE ENDOKIT

## (undated) DEVICE — ORISE PROKNIFE 1.5 MM ELECTRODE: Brand: ORISE™ PROKNIFE

## (undated) DEVICE — ELECTRODE PT RET AD L9FT HI MOIST COND ADH HYDRGEL CORDED

## (undated) DEVICE — CUFF BLD PRSS AD CLTH SGL TB W/ BAYNT CONN ROUNDED CORNER

## (undated) DEVICE — IV START KIT: Brand: MEDLINE

## (undated) DEVICE — CONTAINER SPEC 20 ML LID NEUT BUFF FORMALIN 10 % POLYPR STS

## (undated) DEVICE — TIP SUCT TRNSPAR RIB SURF STD BLB RIG NVENT W/ 5IN1 CONN DYND50138] MEDLINE INDUSTRIES INC]